# Patient Record
Sex: FEMALE | Race: AMERICAN INDIAN OR ALASKA NATIVE | NOT HISPANIC OR LATINO | Employment: OTHER | URBAN - METROPOLITAN AREA
[De-identification: names, ages, dates, MRNs, and addresses within clinical notes are randomized per-mention and may not be internally consistent; named-entity substitution may affect disease eponyms.]

---

## 2017-03-29 PROBLEM — E78.00 PURE HYPERCHOLESTEROLEMIA: Status: ACTIVE | Noted: 2017-03-29

## 2017-06-21 PROBLEM — J18.9 PNEUMONIA: Status: ACTIVE | Noted: 2017-06-21

## 2017-06-22 PROBLEM — H81.10 BPPV (BENIGN PAROXYSMAL POSITIONAL VERTIGO): Status: ACTIVE | Noted: 2017-06-22

## 2017-06-22 PROBLEM — J98.01 BRONCHOSPASM: Status: ACTIVE | Noted: 2017-06-22

## 2017-06-28 PROBLEM — E55.9 VITAMIN D DEFICIENCY: Status: ACTIVE | Noted: 2017-06-28

## 2017-08-23 PROBLEM — R06.09 DOE (DYSPNEA ON EXERTION): Status: ACTIVE | Noted: 2017-08-23

## 2017-08-23 PROBLEM — K21.9 GASTROESOPHAGEAL REFLUX DISEASE WITHOUT ESOPHAGITIS: Status: ACTIVE | Noted: 2017-08-23

## 2017-08-23 PROBLEM — R93.89 ABNORMAL CHEST CT: Status: ACTIVE | Noted: 2017-08-23

## 2017-08-23 PROBLEM — J98.4 RESTRICTIVE LUNG DISEASE: Status: ACTIVE | Noted: 2017-08-23

## 2017-08-28 PROBLEM — M41.80 DEXTROSCOLIOSIS: Status: ACTIVE | Noted: 2017-08-28

## 2017-08-28 PROBLEM — M25.562 LEFT KNEE PAIN: Status: ACTIVE | Noted: 2017-08-28

## 2017-09-29 PROBLEM — F33.41 RECURRENT MAJOR DEPRESSIVE DISORDER, IN PARTIAL REMISSION: Status: ACTIVE | Noted: 2017-09-29

## 2017-09-29 PROBLEM — F41.9 ANXIETY: Status: ACTIVE | Noted: 2017-09-29

## 2017-10-08 PROBLEM — R19.00 PELVIC MASS IN FEMALE: Status: ACTIVE | Noted: 2017-10-08

## 2017-10-09 NOTE — PROGRESS NOTES
"Subjective:       Patient ID: Lindy Sparks is a 67 y.o. female.    Chief Complaint: Pelvic Mass (Consult )    HPI     67yr old para 3 ( x 3) presents as a referral from Dr. Perry secondary to a pelvic mass.  Was visiting in [place] in 2017 and had pelvic pain radiating to back. Seen at [facility] ED and CT scan of abdomen and pelvis was done. This showed a 8.1 x 7.6 cm rounded mass noted within the vagina and appeared contiguous with the cervix. Patient has copy of report but no images.     10/3/2017 saw Dr. Maximilian Perry with complaint of pelvic pain.   US done that showed a midline pelvic mass 11 x 6 cm.     Referred for further management.       Also seen at [facility] in 2017 for WWE. Normal exam. Pessary was changed.   Seen in [place] [facility] [place]. US showed , the uterus appears within normal in size measuring 5.9 x 3.5 x 2.2 cm with hypoechoic area noted over the posterior fundus adjacent to the endometrial canal measuring 5 x 4.4 mm, being avascular and possibly a small fibroid. In the cervical region, a 2 x 1.4 x 0.7 cm prevascular lesion is noted consistent with probable nabothian cyst. Endometrial canal is minimally thickened measuring 3 mm in AP dimension and complex, possibly due to cyclical change.  Neither ovary could be visualized. No adnexal mass nor pelvic fluid could be detected.  Impressions:  Small hypoechoic area noted over the posterior uterine fundus adjacent to the endometrial canal, possibly a small fibroid. Probable nabothian type cyst in the cervical region.  No definite further abnormality could be detected, although neither ovary could be visualized.    Biopsy of lesion on posterior vagina was normal.    ED visit 2017 with inferior sternal pain. Resolved with pain medication and patient discharged 2 hours later.     Today reports pelvic pain that has caused her to lose her voice. Feels like "something is coming out" (pessary not in " "currently) but also feels pressure in her groin. No VB. LMP 20yrs ago.    Last pap 2016, normal. No history of abnormal pap smears.    PMH: CAD, HTN depression, migraines    PSH: lap debi, ectopic (LTV)    FH: F-lung CA (tobacco). No breast, ovarian or colon CA.    No tobacco use.    MMG 2017 with benign appearing small axillary lymph nodes    Colonoscopy  normal    Past Medical History:   Diagnosis Date    Acid reflux     Anxiety     Arthritis     Coronary artery disease     Depression     Ectopic pregnancy     Hypertension     Migraine     Pelvic mass in female 10/8/2017     Past Surgical History:   Procedure Laterality Date     SECTION      CHOLECYSTECTOMY      COLONOSCOPY      ECTOPIC PREGNANCY SURGERY      TONSILLECTOMY       Family History   Problem Relation Age of Onset    Hyperlipidemia Mother     Stroke Mother     Heart disease Father     Stroke Father     Stroke Brother     No Known Problems Daughter     No Known Problems Son     No Known Problems Daughter      Social History   Substance Use Topics    Smoking status: Never Smoker    Smokeless tobacco: Never Used    Alcohol use No      Comment: "maybe like twice a year"     Review of patient's allergies indicates:   Allergen Reactions    Buspar [buspirone] Other (See Comments)     Suicidal thoughts    Lisinopril Other (See Comments)     Unsure of reaction    Naproxen Other (See Comments)     Weakness, dizziness, chest pain    Pravastatin sodium      Chest pain  And headache       Current Outpatient Prescriptions:     albuterol (PROVENTIL) 2.5 mg /3 mL (0.083 %) nebulizer solution, Take 3 mLs (2.5 mg total) by nebulization every 6 (six) hours as needed for Wheezing., Disp: 300 mL, Rfl: 11    albuterol (VENTOLIN HFA) 90 mcg/actuation inhaler, INHALE 2 PUFFS INTO THE LUNGS EVERY 6 HOURS AS NEEDED WHEEZING, Disp: 18 g, Rfl: 0    aspirin (ECOTRIN) 81 MG EC tablet, Take 1 tablet (81 mg total) by mouth once daily., " Disp: 30 tablet, Rfl: 5    atorvastatin (LIPITOR) 20 MG tablet, Take 1 tablet (20 mg total) by mouth once daily., Disp: 30 tablet, Rfl: 5    CARTIA  mg 24 hr capsule, TK 1 C PO ONCE D, Disp: , Rfl: 11    conjugated estrogens (PREMARIN) vaginal cream, Place a pea-sized amount in vagina every other night. Patient unsure of dose, Disp: 45 g, Rfl: 02    DOCUSATE CALCIUM (STOOL SOFTENER ORAL), Take by mouth. Patient unsure of dose, Disp: , Rfl:     ergocalciferol (ERGOCALCIFEROL) 50,000 unit Cap, Take 1 capsule (50,000 Units total) by mouth every 7 days., Disp: 4 capsule, Rfl: 5    escitalopram oxalate (LEXAPRO) 20 MG tablet, Take 1 tablet (20 mg total) by mouth once daily., Disp: 30 tablet, Rfl: 5    fluticasone-vilanterol (BREO) 100-25 mcg/dose diskus inhaler, Inhale 1 puff into the lungs once daily. Controller, Disp: 28 each, Rfl: 5    gabapentin (NEURONTIN) 400 MG capsule, Take 1 capsule (400 mg total) by mouth every evening., Disp: 30 capsule, Rfl: 5    hydrochlorothiazide (MICROZIDE) 12.5 mg capsule, TAKE 1 CAPSULE BY MOUTH EVERY DAY, Disp: 30 capsule, Rfl: 5    hydrocodone-acetaminophen 7.5-325mg (NORCO) 7.5-325 mg per tablet, Take 1 tablet by mouth every 12 (twelve) hours as needed. g43.709, Disp: 60 tablet, Rfl: 0    hydrOXYzine pamoate (VISTARIL) 25 MG Cap, Take 1 capsule (25 mg total) by mouth every 6 (six) hours as needed., Disp: 120 capsule, Rfl: 3    losartan (COZAAR) 100 MG tablet, Take 1 tablet (100 mg total) by mouth once daily., Disp: 90 tablet, Rfl: 3    methocarbamol (ROBAXIN) 500 MG Tab, , Disp: , Rfl: 2    pantoprazole (PROTONIX) 40 MG tablet, Take 1 tablet (40 mg total) by mouth once daily. For reflux, Disp: 30 tablet, Rfl: 5    Review of Systems   Constitutional: Positive for fatigue. Negative for chills and fever.   Respiratory: Negative for cough, shortness of breath and wheezing.    Cardiovascular: Negative for chest pain, palpitations and leg swelling.   Gastrointestinal:  "Negative for abdominal pain, constipation, diarrhea, nausea and vomiting.   Genitourinary: Positive for frequency, pelvic pain and urgency. Negative for difficulty urinating, dysuria, hematuria, vaginal bleeding, vaginal discharge and vaginal pain.   Musculoskeletal: Positive for back pain.   Skin: Negative for color change and rash.   Neurological: Negative for weakness.   Hematological: Negative for adenopathy. Does not bruise/bleed easily.   Psychiatric/Behavioral: The patient is nervous/anxious.        Objective:     BP (!) 170/93   Pulse 71   Ht 5' 6" (1.676 m)   Wt 88.6 kg (195 lb 5.2 oz)   LMP  (LMP Unknown) Comment: 20 yrs ago, no PMB  BMI 31.53 kg/m²     Physical Exam   Constitutional: She is oriented to person, place, and time. She appears well-developed and well-nourished. No distress.   Neck: Normal range of motion.   Cardiovascular: Normal rate, regular rhythm and normal heart sounds.    No murmur heard.  Pulmonary/Chest: Effort normal and breath sounds normal. No respiratory distress. She has no wheezes. She has no rales.   Abdominal: Soft. She exhibits no distension and no ascites. There is tenderness in the right lower quadrant, suprapubic area and left lower quadrant. There is guarding.   Genitourinary: Vagina normal. No breast tenderness. Pelvic exam was performed with patient supine. There is no rash, tenderness or lesion on the right labia. There is no rash, tenderness or lesion on the left labia. Uterus is enlarged and tender. Right adnexum displays tenderness. Left adnexum displays tenderness. No bleeding in the vagina. No vaginal discharge found.   Genitourinary Comments: 12cm soft, fluid filled mass displacing cervix and DEX, unable to palpate adnexa or discern uterus and adnexa from mass   Musculoskeletal: Normal range of motion. She exhibits no edema or tenderness.   Neurological: She is alert and oriented to person, place, and time.   Skin: Skin is warm and dry. No rash noted. She is " not diaphoretic. No erythema. No pallor.   Psychiatric: She has a normal mood and affect. Her behavior is normal.   Vitals reviewed.      Assessment:       1. Pelvic mass in female    2. Recurrent major depressive disorder, in partial remission    3. SHAW on CPAP    4. Hoarseness    5. Essential hypertension    6. Coronary artery disease involving native coronary artery of native heart without angina pectoris        Plan:   Pelvic mass in female  I have recommended ANTONIO/BSO and staging as indicated by frozen section. Surgery scheduled for 10/30/2017   Consent forms were reviewed with patient. Questions were answered. Patient voiced understanding. Consents were signed.  Preop orders placed.     -     hydrocodone-acetaminophen 7.5-325mg (NORCO) 7.5-325 mg per tablet; Take 1 tablet by mouth every 12 (twelve) hours as needed. g43.709  Dispense: 60 tablet; Refill: 0  -     CBC auto differential; Future; Expected date: 10/10/2017  -     Comprehensive metabolic panel; Future; Expected date: 10/10/2017  -     CT Abdomen Pelvis With Contrast; Future; Expected date: 10/10/2017  -     X-Ray Chest PA And Lateral; Future; Expected date: 10/10/2017    Recurrent major depressive disorder, in partial remission    SHAW on CPAP    Hoarseness  Patient initially had this and was able to only whisper and then only for a short time.  Daughter was answering questions. After signing consents I came back into the office to speak with the patient and family and she was able to talk.   Essential hypertension    Coronary artery disease involving native coronary artery of native heart without angina pectoris  -     EKG 12-lead; Future    - schedule exploratory laparotomy, ANTONIO/BSO, resection of pelvic mass, possible staging pending frozen section  - clearance with IM  - The risks, benefits, and indications for surgery were discussed with the patient. These included bleeding, infection, damage to surrounding tissues, the possibility of bowel or  urologic resection and reconstruction, and the possibility of major complications including death. She voiced understanding, all questions were answered and consents were signed.    Family states that patient had hysterectomy done at time of ectopic pregnancy in the 1990's. All information would suggest that she still has her uterus. Clinical impression is that the mass may be a hematometra.

## 2017-10-10 ENCOUNTER — TELEPHONE (OUTPATIENT)
Dept: GYNECOLOGIC ONCOLOGY | Facility: CLINIC | Age: 67
End: 2017-10-10

## 2017-10-10 ENCOUNTER — HOSPITAL ENCOUNTER (OUTPATIENT)
Dept: CARDIOLOGY | Facility: CLINIC | Age: 67
Discharge: HOME | End: 2017-10-10
Payer: MEDICAID

## 2017-10-10 ENCOUNTER — HOSPITAL ENCOUNTER (OUTPATIENT)
Dept: RADIOLOGY | Facility: HOSPITAL | Age: 67
Discharge: HOME | End: 2017-10-10
Attending: OBSTETRICS & GYNECOLOGY
Payer: MEDICAID

## 2017-10-10 ENCOUNTER — INITIAL CONSULT (OUTPATIENT)
Dept: GYNECOLOGIC ONCOLOGY | Facility: CLINIC | Age: 67
End: 2017-10-10
Payer: MEDICAID

## 2017-10-10 VITALS
WEIGHT: 195.31 LBS | SYSTOLIC BLOOD PRESSURE: 170 MMHG | BODY MASS INDEX: 31.39 KG/M2 | HEART RATE: 71 BPM | HEIGHT: 66 IN | DIASTOLIC BLOOD PRESSURE: 93 MMHG

## 2017-10-10 DIAGNOSIS — F33.41 RECURRENT MAJOR DEPRESSIVE DISORDER, IN PARTIAL REMISSION: ICD-10-CM

## 2017-10-10 DIAGNOSIS — R49.0 HOARSENESS: ICD-10-CM

## 2017-10-10 DIAGNOSIS — R19.00 PELVIC MASS IN FEMALE: Primary | ICD-10-CM

## 2017-10-10 DIAGNOSIS — R19.00 PELVIC MASS IN FEMALE: ICD-10-CM

## 2017-10-10 DIAGNOSIS — I25.10 CORONARY ARTERY DISEASE INVOLVING NATIVE CORONARY ARTERY OF NATIVE HEART WITHOUT ANGINA PECTORIS: Chronic | ICD-10-CM

## 2017-10-10 DIAGNOSIS — G47.33 OSA ON CPAP: Chronic | ICD-10-CM

## 2017-10-10 DIAGNOSIS — I10 ESSENTIAL HYPERTENSION: Chronic | ICD-10-CM

## 2017-10-10 PROCEDURE — 71020 XR CHEST PA AND LATERAL: CPT | Mod: 26,,, | Performed by: RADIOLOGY

## 2017-10-10 PROCEDURE — 99205 OFFICE O/P NEW HI 60 MIN: CPT | Mod: S$PBB,,, | Performed by: OBSTETRICS & GYNECOLOGY

## 2017-10-10 PROCEDURE — 93010 ELECTROCARDIOGRAM REPORT: CPT | Mod: S$PBB,,, | Performed by: INTERNAL MEDICINE

## 2017-10-10 PROCEDURE — 99999 PR PBB SHADOW E&M-EST. PATIENT-LVL III: CPT | Mod: PBBFAC,,, | Performed by: OBSTETRICS & GYNECOLOGY

## 2017-10-10 PROCEDURE — 71020 XR CHEST PA AND LATERAL: CPT | Mod: TC

## 2017-10-10 PROCEDURE — 93005 ELECTROCARDIOGRAM TRACING: CPT | Mod: PBBFAC | Performed by: INTERNAL MEDICINE

## 2017-10-10 PROCEDURE — 99213 OFFICE O/P EST LOW 20 MIN: CPT | Mod: PBBFAC,25 | Performed by: OBSTETRICS & GYNECOLOGY

## 2017-10-10 RX ORDER — HYDROCODONE BITARTRATE AND ACETAMINOPHEN 7.5; 325 MG/1; MG/1
1 TABLET ORAL EVERY 12 HOURS PRN
Qty: 60 TABLET | Refills: 0 | Status: SHIPPED | OUTPATIENT
Start: 2017-10-10 | End: 2017-10-11

## 2017-10-10 RX ORDER — LIDOCAINE HYDROCHLORIDE 10 MG/ML
1 INJECTION, SOLUTION EPIDURAL; INFILTRATION; INTRACAUDAL; PERINEURAL ONCE
Status: CANCELLED | OUTPATIENT
Start: 2017-10-10 | End: 2017-10-10

## 2017-10-10 NOTE — LETTER
October 10, 2017      Maximilian Perry MD  [address] [address]           [facility] - [facility]  [address]  [place] [address]  Phone: [phone]          Patient: Lindy Sparks   MR Number: [id number]   YOB: 1950   Date of Visit: 10/10/2017       Dear Dr. Maximilian Perry:    Thank you for referring Lindy Sparks to me for evaluation. Attached you will find relevant portions of my assessment and plan of care.    If you have questions, please do not hesitate to call me. I look forward to following Lindy Sparsk along with you.    Sincerely,    Junior Buitrago MD    Enclosure  CC:  No Recipients    If you would like to receive this communication electronically, please contact [email or web address] or [phone] to request more information on [facility] access.    For providers and/or their staff who would like to refer a patient to [facility], please contact us through our one-stop-shop provider referral line, [facility], at [phone].    If you feel you have received this communication in error or would no longer like to receive these types of communications, please e-mail [email or web address]

## 2017-10-11 DIAGNOSIS — R19.00 PELVIC MASS IN FEMALE: Primary | ICD-10-CM

## 2017-10-11 RX ORDER — OXYCODONE AND ACETAMINOPHEN 10; 325 MG/1; MG/1
1 TABLET ORAL EVERY 6 HOURS PRN
Qty: 28 TABLET | Refills: 0 | Status: SHIPPED | OUTPATIENT
Start: 2017-10-11 | End: 2017-10-20

## 2017-10-11 NOTE — PROGRESS NOTES
Patient informed of CT scan results. Pharmacy would not fill her percocet 7.5 mg . New rx for 10 mg/325 mg percocet sent to pharmacy.

## 2017-10-16 NOTE — PRE ADMISSION SCREENING
Anesthesia Assessment: Preoperative EQUATION    Planned Procedure: Procedure(s) (LRB):  HYSTERECTOMY-ABDOMINAL-TOTAL (ANTONIO) (N/A)  [id number] (BSO) (Bilateral)  STAGING (N/A)  Requested Anesthesia Type:General  Surgeon: Junior Buitrago MD  Service: OB/GYN  Known or anticipated Date of Surgery:10/30/2017    Surgeon notes: reviewed    Electronic QUestionnaire Assessment completed via nurse interview with patient.      NO AQ    Triage considerations:     The patient has no apparent active cardiac condition (No unstable coronary Syndrome such as severe unstable angina or recent [<1 month] myocardial infarction, decompensated CHF, severe valvular   disease or significant arrhythmia)    Previous anesthesia records:Not available    Last PCP note: within 3 months , within [facility]  9/29  Subspecialty notes: Pulmonary  8/17    Other important co-morbidities: CAD, HTN, SHAW/CPAP, GERD, Depression/Anxiety, Vertigo, Headaches, Neuropathy, SOB     Tests already available:  Available tests,  within 1 month , within [facility] .   10/2017 CMP, CBC, CT Abd/Pelvis, EKG            Instructions given. (See in Nurse's note)    Optimization:  Anesthesia Preop Clinic Assessment  Indicated-Request for Medical Opinion from Gyn    Medical Opinion Indicated-Indicated        Plan:    Testing:  T&S          Consultation:[facility]-Operative Medical Consult-Dr Johnson  10/19     Patient  has previously scheduled Medical Appointment: 10/19 Dr Johnson    Navigation: Tests Scheduled. T&S  10/19            Results will be tracked by Preop Clinic.  10/20/17 Patient is optimized for surgery per Dr Johnson on 10/19/17    Fadumo Lamar RN

## 2017-10-17 ENCOUNTER — ANESTHESIA EVENT (OUTPATIENT)
Dept: SURGERY | Facility: HOSPITAL | Age: 67
DRG: 743 | End: 2017-10-17
Payer: MEDICAID

## 2017-10-17 DIAGNOSIS — Z01.818 PREOP TESTING: Primary | ICD-10-CM

## 2017-10-17 NOTE — ANESTHESIA PREPROCEDURE EVALUATION
[facility]  Anesthesia Pre-Operative Evaluation         Patient Name: Lindy Sparks  YOB: 1950  MRN: [id number]    SUBJECTIVE:     Pre-operative evaluation for Procedure(s) (LRB):  HYSTERECTOMY-ABDOMINAL-TOTAL (ANTONIO) (N/A)  [id number] (BSO) (Bilateral)  STAGING (N/A)     10/27/2017    Lindy Sparks is a 67 y.o. female w/ a significant PMHx of CAD, HTN, SHAW/CPAP, GERD, Depression/Anxiety, and   Recently discovered pelvic mass who presents for the above procedure.    Patient now presents for the above procedure(s).      LDA: None documented.    Prev airway: None documented.    Drips: None documented.    Patient Active Problem List   Diagnosis    Generalized anxiety disorder    Depression    Chronic lower back pain    Neuropathy    GERD (gastroesophageal reflux disease)    Allergic rhinitis    Coronary artery disease involving native coronary artery without angina pectoris, non obstructive    Trochanteric bursitis of right hip    Hoarseness    Migraine without status migrainosus, not intractable    SHAW on CPAP    Grief reaction    Mitral regurgitation    Diastolic dysfunction    Essential hypertension    Postural instability of trunk    Forgetfulness    Right arm pain    Obesity, Class I, BMI 30-34.9    Atrophic vaginitis    POP-Q stage 3 rectocele    Pure hypercholesterolemia    Bronchospasm    BPPV (benign paroxysmal positional vertigo)    Vitamin D deficiency    THOMPSON (dyspnea on exertion)    Abnormal chest CT    Gastroesophageal reflux disease without esophagitis    Restrictive lung disease    Left knee pain    Dextroscoliosis    Recurrent major depressive disorder, in partial remission    Anxiety    Pelvic mass in female    Pelvic mass in female    Atypical chest pain    Chronic, continuous use of opioids    Kidney stones    Edema    Tattoo       Review of patient's allergies indicates:   Allergen Reactions    Norvasc  [amlodipine] Swelling    Buspar [buspirone] Other (See Comments)     Suicidal thoughts    Lisinopril Other (See Comments)     Unsure of reaction    Naproxen Other (See Comments)     Weakness, dizziness, chest pain    Pravastatin sodium      Chest pain  And headache       No current facility-administered medications for this encounter.     Current Outpatient Prescriptions:     acetaminophen (TYLENOL) 325 MG tablet, Take 325 mg by mouth every 6 (six) hours as needed for Pain., Disp: , Rfl:     albuterol (PROVENTIL) 2.5 mg /3 mL (0.083 %) nebulizer solution, Take 3 mLs (2.5 mg total) by nebulization every 6 (six) hours as needed for Wheezing., Disp: 300 mL, Rfl: 11    albuterol (VENTOLIN HFA) 90 mcg/actuation inhaler, INHALE 2 PUFFS INTO THE LUNGS EVERY 6 HOURS AS NEEDED WHEEZING, Disp: 18 g, Rfl: 0    aspirin (ECOTRIN) 81 MG EC tablet, Take 1 tablet (81 mg total) by mouth once daily. (Patient taking differently: Take 81 mg by mouth every morning. ), Disp: 30 tablet, Rfl: 5    atorvastatin (LIPITOR) 20 MG tablet, Take 1 tablet (20 mg total) by mouth once daily. (Patient taking differently: Take 20 mg by mouth every morning. ), Disp: 30 tablet, Rfl: 5    conjugated estrogens (PREMARIN) vaginal cream, Place a pea-sized amount in vagina every other night. Patient unsure of dose, Disp: 45 g, Rfl: 02    DOCUSATE CALCIUM (STOOL SOFTENER ORAL), Take by mouth as needed. Patient unsure of dose, Disp: , Rfl:     ergocalciferol (ERGOCALCIFEROL) 50,000 unit Cap, Take 1 capsule (50,000 Units total) by mouth every 7 days. (Patient taking differently: Take 50,000 Units by mouth every 7 days. Takes on Fri), Disp: 4 capsule, Rfl: 5    escitalopram oxalate (LEXAPRO) 20 MG tablet, Take 1 tablet (20 mg total) by mouth once daily. (Patient taking differently: Take 20 mg by mouth every morning. ), Disp: 30 tablet, Rfl: 5    fluticasone-vilanterol (BREO) 100-25 mcg/dose diskus inhaler, Inhale 1 puff into the lungs once daily.  Controller (Patient taking differently: Inhale 1 puff into the lungs every morning. Controller), Disp: 28 each, Rfl: 5    gabapentin (NEURONTIN) 400 MG capsule, Take 1 capsule (400 mg total) by mouth every evening. (Patient taking differently: Take 400 mg by mouth every morning. ), Disp: 30 capsule, Rfl: 5    hydroCHLOROthiazide (MICROZIDE) 12.5 mg capsule, TAKE 1 CAPSULE BY MOUTH EVERY DAY, Disp: 30 capsule, Rfl: 3    losartan (COZAAR) 100 MG tablet, Take 1 tablet (100 mg total) by mouth once daily. (Patient taking differently: Take 100 mg by mouth every morning. ), Disp: 90 tablet, Rfl: 3    methocarbamol (ROBAXIN) 500 MG Tab, every other day. , Disp: , Rfl: 2    ondansetron (ZOFRAN) 4 MG tablet, Take 1 tablet (4 mg total) by mouth every 8 (eight) hours as needed., Disp: 12 tablet, Rfl: 0    pantoprazole (PROTONIX) 40 MG tablet, Take 1 tablet (40 mg total) by mouth once daily. For reflux (Patient taking differently: Take 40 mg by mouth every morning. For reflux), Disp: 30 tablet, Rfl: 5    sumatriptan (IMITREX) 50 MG tablet, Take 50 mg by mouth every 2 (two) hours as needed for Migraine., Disp: , Rfl:     VENTOLIN HFA 90 mcg/actuation inhaler, INHALE 2 PUFFS INTO THE LUNGS EVERY 6 HOURS AS NEEDED WHEEZING, Disp: 18 g, Rfl: 0    No current facility-administered medications on file prior to encounter.      Current Outpatient Prescriptions on File Prior to Encounter   Medication Sig Dispense Refill    albuterol (PROVENTIL) 2.5 mg /3 mL (0.083 %) nebulizer solution Take 3 mLs (2.5 mg total) by nebulization every 6 (six) hours as needed for Wheezing. 300 mL 11    albuterol (VENTOLIN HFA) 90 mcg/actuation inhaler INHALE 2 PUFFS INTO THE LUNGS EVERY 6 HOURS AS NEEDED WHEEZING 18 g 0    aspirin (ECOTRIN) 81 MG EC tablet Take 1 tablet (81 mg total) by mouth once daily. (Patient taking differently: Take 81 mg by mouth every morning. ) 30 tablet 5    atorvastatin (LIPITOR) 20 MG tablet Take 1 tablet (20 mg total)  by mouth once daily. (Patient taking differently: Take 20 mg by mouth every morning. ) 30 tablet 5    conjugated estrogens (PREMARIN) vaginal cream Place a pea-sized amount in vagina every other night. Patient unsure of dose 45 g 02    DOCUSATE CALCIUM (STOOL SOFTENER ORAL) Take by mouth as needed. Patient unsure of dose      ergocalciferol (ERGOCALCIFEROL) 50,000 unit Cap Take 1 capsule (50,000 Units total) by mouth every 7 days. (Patient taking differently: Take 50,000 Units by mouth every 7 days. Takes on Fri) 4 capsule 5    escitalopram oxalate (LEXAPRO) 20 MG tablet Take 1 tablet (20 mg total) by mouth once daily. (Patient taking differently: Take 20 mg by mouth every morning. ) 30 tablet 5    fluticasone-vilanterol (BREO) 100-25 mcg/dose diskus inhaler Inhale 1 puff into the lungs once daily. Controller (Patient taking differently: Inhale 1 puff into the lungs every morning. Controller) 28 each 5    gabapentin (NEURONTIN) 400 MG capsule Take 1 capsule (400 mg total) by mouth every evening. (Patient taking differently: Take 400 mg by mouth every morning. ) 30 capsule 5    losartan (COZAAR) 100 MG tablet Take 1 tablet (100 mg total) by mouth once daily. (Patient taking differently: Take 100 mg by mouth every morning. ) 90 tablet 3    methocarbamol (ROBAXIN) 500 MG Tab every other day.   2    pantoprazole (PROTONIX) 40 MG tablet Take 1 tablet (40 mg total) by mouth once daily. For reflux (Patient taking differently: Take 40 mg by mouth every morning. For reflux) 30 tablet 5       Past Surgical History:   Procedure Laterality Date     SECTION      CHOLECYSTECTOMY      COLONOSCOPY      ECTOPIC PREGNANCY SURGERY      TONSILLECTOMY         Social History     Social History    Marital status: Single     Spouse name: N/A    Number of children: N/A    Years of education: N/A     Occupational History    Not on file.     Social History Main Topics    Smoking status: Never Smoker    Smokeless  tobacco: Never Used    Alcohol use 0.6 oz/week     1 Standard drinks or equivalent per week      Comment: occ    Drug use: No    Sexual activity: Yes     Partners: Male     Birth control/ protection: Surgical     Other Topics Concern    Not on file     Social History Narrative    No narrative on file       OBJECTIVE:     Vital Signs Range (Last 24H):         Significant Labs:  Lab Results   Component Value Date    WBC 8.23 10/20/2017    HGB 12.5 10/20/2017    HCT 39.8 10/20/2017     10/20/2017    CHOL 142 06/21/2017    TRIG 105 06/21/2017    HDL 35 (L) 06/21/2017    ALT 21 10/20/2017    AST 23 10/20/2017     10/20/2017    K 3.5 10/20/2017     10/20/2017    CREATININE 0.7 10/20/2017    BUN 17 10/20/2017    CO2 27 10/20/2017    TSH 2.550 09/13/2017    INR 1.0 09/13/2017    HGBA1C 6.2 03/22/2017       Diagnostic Studies: 10/10/17 CT Abdomen/Pelvis  Interval development of enlargement of the uterus with low density in the endometrial canal. The possibility of a lesion in the cervix should be considered and direct visualization with hysteroscopy may be necessary for further assessment.  Evidence for a prior cholecystectomy.  Edema with mildly prominent lymph nodes in the mesentery suggestive of mesenteric lymphadenitis.  Metallic clip in the pelvis.    PFT  7/2017   Normal spirometry, no evidence of obstruction.  No response to bronchodilation.  Lung volumes reveal mild restriction.  DLCO mildly decreased..  In comparison to complete pulmonary function test performed in 04/2016,   there appears to be notable decrease in spirometric values and also lung volumes appear to reveal further worsening restricted physiology.    EKG: 10/10/17  Normal sinus rhythm  Normal ECG  When compared with ECG of 13-SEP-2017 05:50,  No significant change was found    2D ECHO:  Results for orders placed or performed during the hospital encounter of 06/05/16   2D echo with color flow doppler   Result Value Ref Range     EF 55 55 - 65    Mitral Valve Regurgitation TRIVIAL     Diastolic Dysfunction No     Est. PA Systolic Pressure 24.9     Tricuspid Valve Regurgitation TRIVIAL           ASSESSMENT/PLAN:     Anesthesia Assessment: Preoperative EQUATION     Planned Procedure: Procedure(s) (LRB):  HYSTERECTOMY-ABDOMINAL-TOTAL (ANTONIO) (N/A)  [id number] (BSO) (Bilateral)  STAGING (N/A)  Requested Anesthesia Type:General  Surgeon: Junior Buitrago MD  Service: OB/GYN  Known or anticipated Date of Surgery:10/30/2017     Surgeon notes: reviewed     Electronic QUestionnaire Assessment completed via nurse interview with patient.      NO AQ     Triage considerations:      The patient has no apparent active cardiac condition (No unstable coronary Syndrome such as severe unstable angina or recent [<1 month] myocardial infarction, decompensated CHF, severe valvular   disease or significant arrhythmia)     Previous anesthesia records:Not available     Last PCP note: within 3 months , within [facility]  9/29  Subspecialty notes: Pulmonary  8/17     Other important co-morbidities: CAD, HTN, SHAW/CPAP, GERD, Depression/Anxiety, Vertigo, Headaches, Neuropathy, SOB     Tests already available:  Available tests,  within 1 month , within [facility] .   10/2017 CMP, CBC, CT Abd/Pelvis, EKG                            Instructions given. (See in Nurse's note)     Optimization:  Anesthesia Preop Clinic Assessment  Indicated-Request for Medical Opinion from Gyn    Medical Opinion Indicated-Indicated                                        Plan:    Testing:  T&S                                                                 Consultation:[facility]-Operative Medical Consult-Dr Johnson  10/19                           Patient  has previously scheduled Medical Appointment: 10/19 Dr Johnson     Navigation: Tests Scheduled. T&S  10/19                       Results will be tracked by Preop Clinic.  10/20/17 Patient is optimized for surgery per Dr Johnson on  10/19/17      Fadumo Lamar RN                                                                                                                  10/17/2017  Lindy Sparks is a 67 y.o., female.    Anesthesia Evaluation    I have reviewed the Patient Summary Reports.    I have reviewed the Nursing Notes.   I have reviewed the Medications.     Review of Systems  Anesthesia Hx:  No problems with previous Anesthesia  History of prior surgery of interest to airway management or planning: Previous anesthesia: General Denies Family Hx of Anesthesia complications.   Denies Personal Hx of Anesthesia complications.   Social:  Alcohol Use, Non-Smoker    Hematology/Oncology:  Hematology Normal   Oncology Normal     EENT/Dental:EENT/Dental Normal   Cardiovascular:   Hypertension CAD   Denies CABG/stent.  Denies Dysrhythmias.      THOMPSON ECG has been reviewed.    Pulmonary:   Asthma moderate Shortness of breath Sleep Apnea, CPAP Mild restrictive lung disease   Renal/:  Renal/ Normal     Hepatic/GI:   GERD    Musculoskeletal:  Ankylosing Spondylitis    Neurological:   Headaches  Dx of Headaches   Endocrine:  Endocrine Normal    Psych:   Psychiatric History anxiety depression          Physical Exam  General:  Well nourished    Airway/Jaw/Neck:  Airway Findings: Mouth Opening: Normal Tongue: Normal  Jaw/Neck Findings:  Neck ROM: Normal ROM      Dental:  Dental Findings:    Chest/Lungs:  Chest/Lungs Findings: Clear to auscultation, Normal Respiratory Rate     Heart/Vascular:  Heart Findings: Normal Heart murmur: negative    Abdomen:  Abdomen Findings: Normal    Musculoskeletal:  Musculoskeletal Findings: Normal   Skin:  Skin Findings: Normal    Mental Status:  Mental Status Findings:  Cooperative, Alert and Oriented         Anesthesia Plan  Type of Anesthesia, risks & benefits discussed:  Anesthesia Type:  general  Patient's Preference:   Intra-op Monitoring Plan: standard ASA monitors and arterial line  Intra-op Monitoring Plan  Comments:   Post Op Pain Control Plan: per primary service following discharge from PACU, IV/PO Opioids PRN and multimodal analgesia  Post Op Pain Control Plan Comments:   Induction:   IV  Beta Blocker:  Patient is not currently on a Beta-Blocker (No further documentation required).       Informed Consent: Patient understands risks and agrees with Anesthesia plan.  Questions answered. Anesthesia consent signed with patient.  ASA Score: 3     Day of Surgery Review of History & Physical:  There are no significant changes.          Ready For Surgery From Anesthesia Perspective.

## 2017-10-19 ENCOUNTER — INITIAL CONSULT (OUTPATIENT)
Dept: INTERNAL MEDICINE | Facility: CLINIC | Age: 67
End: 2017-10-19
Payer: MEDICAID

## 2017-10-19 ENCOUNTER — LAB VISIT (OUTPATIENT)
Dept: LAB | Facility: HOSPITAL | Age: 67
End: 2017-10-19
Attending: ANESTHESIOLOGY
Payer: MEDICAID

## 2017-10-19 ENCOUNTER — TELEPHONE (OUTPATIENT)
Dept: GYNECOLOGIC ONCOLOGY | Facility: CLINIC | Age: 67
End: 2017-10-19

## 2017-10-19 ENCOUNTER — NURSE TRIAGE (OUTPATIENT)
Dept: ADMINISTRATIVE | Facility: CLINIC | Age: 67
End: 2017-10-19

## 2017-10-19 VITALS
SYSTOLIC BLOOD PRESSURE: 152 MMHG | HEIGHT: 66 IN | BODY MASS INDEX: 31.23 KG/M2 | DIASTOLIC BLOOD PRESSURE: 97 MMHG | HEART RATE: 58 BPM | TEMPERATURE: 97 F | OXYGEN SATURATION: 95 % | WEIGHT: 194.31 LBS

## 2017-10-19 DIAGNOSIS — L81.8 TATTOO: ICD-10-CM

## 2017-10-19 DIAGNOSIS — N20.0 KIDNEY STONES: ICD-10-CM

## 2017-10-19 DIAGNOSIS — F11.90 CHRONIC, CONTINUOUS USE OF OPIOIDS: ICD-10-CM

## 2017-10-19 DIAGNOSIS — F32.A DEPRESSION, UNSPECIFIED DEPRESSION TYPE: Chronic | ICD-10-CM

## 2017-10-19 DIAGNOSIS — J98.4 RESTRICTIVE LUNG DISEASE: ICD-10-CM

## 2017-10-19 DIAGNOSIS — K21.9 GASTROESOPHAGEAL REFLUX DISEASE, ESOPHAGITIS PRESENCE NOT SPECIFIED: Chronic | ICD-10-CM

## 2017-10-19 DIAGNOSIS — R60.9 EDEMA, UNSPECIFIED TYPE: ICD-10-CM

## 2017-10-19 DIAGNOSIS — I51.89 DIASTOLIC DYSFUNCTION: Chronic | ICD-10-CM

## 2017-10-19 DIAGNOSIS — Z01.818 PREOP TESTING: ICD-10-CM

## 2017-10-19 DIAGNOSIS — Z01.818 PREOP EXAMINATION: Primary | ICD-10-CM

## 2017-10-19 DIAGNOSIS — G47.33 OSA ON CPAP: Chronic | ICD-10-CM

## 2017-10-19 DIAGNOSIS — R07.89 ATYPICAL CHEST PAIN: ICD-10-CM

## 2017-10-19 DIAGNOSIS — I10 ESSENTIAL HYPERTENSION: Chronic | ICD-10-CM

## 2017-10-19 PROBLEM — J18.9 PNEUMONIA: Status: RESOLVED | Noted: 2017-06-21 | Resolved: 2017-10-19

## 2017-10-19 LAB
ABO + RH BLD: NORMAL
BLD GP AB SCN CELLS X3 SERPL QL: NORMAL

## 2017-10-19 PROCEDURE — 36415 COLL VENOUS BLD VENIPUNCTURE: CPT

## 2017-10-19 PROCEDURE — 86900 BLOOD TYPING SEROLOGIC ABO: CPT

## 2017-10-19 PROCEDURE — 99214 OFFICE O/P EST MOD 30 MIN: CPT | Mod: PBBFAC | Performed by: HOSPITALIST

## 2017-10-19 PROCEDURE — 86850 RBC ANTIBODY SCREEN: CPT

## 2017-10-19 PROCEDURE — 99214 OFFICE O/P EST MOD 30 MIN: CPT | Mod: S$PBB,,, | Performed by: HOSPITALIST

## 2017-10-19 PROCEDURE — 99999 PR PBB SHADOW E&M-EST. PATIENT-LVL IV: CPT | Mod: PBBFAC,,, | Performed by: HOSPITALIST

## 2017-10-19 RX ORDER — ACETAMINOPHEN 325 MG/1
325 TABLET ORAL EVERY 6 HOURS PRN
Status: ON HOLD | COMMUNITY
End: 2017-11-01 | Stop reason: HOSPADM

## 2017-10-19 RX ORDER — SUMATRIPTAN 50 MG/1
50 TABLET, FILM COATED ORAL
COMMUNITY
End: 2017-11-09 | Stop reason: SDUPTHER

## 2017-10-19 NOTE — ASSESSMENT & PLAN NOTE
Hypertension-  Blood pressure is acceptable . I suggest continuation of -Cartia-- during the entire perioperative period. I suggest holding -losartan, HCTZ- on the morning of the surgery and can continue that  post operatively under blood pressure, electrolyte and renal function monitoring as long as they are acceptable.I suggest addressing pain control as uncontrolled pain can increased blood pressure
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
## 2017-10-19 NOTE — ASSESSMENT & PLAN NOTE
Edema- I suggested avoidance of added salt,avoidance of NSAID's ( except ASA 81 mg )  and suggested Limb elevation and ron hose use

## 2017-10-19 NOTE — ASSESSMENT & PLAN NOTE
Chronic continuous opioid use- In view of the opioid use, the patient may have opioid tolerance .  I suggest considering the possibility of opioid tolerance  in planning post operative pain control

## 2017-10-19 NOTE — ASSESSMENT & PLAN NOTE
I suggest  caution with usage of medication that can cause respiratory suppression in the perioperative period

## 2017-10-19 NOTE — ASSESSMENT & PLAN NOTE
GERD-  I suggest continuation of the Proton pump inhibitor in the perioperative period . I suggest aspiration precautions  Discussed tips to control acid reflux

## 2017-10-19 NOTE — ASSESSMENT & PLAN NOTE
Does not sound cardiac in nature   Cardiac cath showed non obstructive CAD  CT showed No pulmonary embolism   Symptoms possibly from Aced reflux

## 2017-10-19 NOTE — TELEPHONE ENCOUNTER
----- Message from Johanna Herndon sent at 10/19/2017  1:15 PM CDT -----  Regarding: Additional information needed  Good Afternoon,      Regarding patient Lindy Sparks, Her insurance company is asking for the pt and doctor to sign the medicaid hystectomy consent form? I have it ready to send. Is there a fax# to send this to and have it returned to me asap? The insurance company is requesting this no later than tomorrow?      Johanna [id number]

## 2017-10-19 NOTE — LETTER
October 19, 2017      Rhonda G Leopold, MD  [address]  [place] [address]           [facility] - Pre Op Consult  [address] [facility] [address]  Phone: [phone]          Patient: Lindy Sparks   MR Number: [id number]   YOB: 1950   Date of Visit: 10/19/2017       Dear Dr. Rhonda G Leopold:    Thank you for referring Lindy Sparks to me for evaluation. Attached you will find relevant portions of my assessment and plan of care.    If you have questions, please do not hesitate to call me. I look forward to following Lindy Sparks along with you.    Sincerely,    Jessica Johnson MD    Enclosure  CC:  MD Junior Adkins MD    If you would like to receive this communication electronically, please contact [email or web address] or [phone] to request more information on [employer] access.    For providers and/or their staff who would like to refer a patient to [facility], please contact us through our one-stop-shop provider referral line, [facility], at [phone].    If you feel you have received this communication in error or would no longer like to receive these types of communications, please e-mail [email or web address]

## 2017-10-19 NOTE — ASSESSMENT & PLAN NOTE
I suggest monitoring the sodium as SIADH from Lexapro  I  use and hypersecretion of ADH associated with surgery can reduce sodium in the perioperative period  Bereavement in family , 3 of her grand children that she raised were incarcerated   Suspect some of her symptoms may be from her depression
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
## 2017-10-19 NOTE — TELEPHONE ENCOUNTER
Left message for pt to contact office to set up time for her to come in and sign medicaid form by Monday for upcoming procedure on Monday 10/30.

## 2017-10-19 NOTE — ASSESSMENT & PLAN NOTE
PFT - Aug 2017    Study meets validity.  Normal spirometry, no evidence of obstruction.  No response to bronchodilation.  Lung volumes reveal mild restriction.  DLCO mildly decreased..  In comparison to complete pulmonary function test performed in 04/2016,   there appears to be notable decrease in spirometric values and also lung volumes  appear to reveal further worsening restricted physiology.  SOB on exertion- for 6 months   Not on supplemental oxygen

## 2017-10-19 NOTE — OUTPATIENT SUBJECTIVE & OBJECTIVE
Outpatient Subjective & Objective     Chief complaint-Preoperative evaluation, Perioperative Medical management, complication reduction plan     Active cardiac conditions- none    Revised cardiac risk index predictors- high-risk type of surgery    Functional capacity -Examples of physical activity , used to be active until 2017, was working in the yard--history of chest pain on exertion that did not stop her activities    METS less than 4 METs  Under cardiology care , having stress test 10/23     Review of Systems   Constitutional: Negative for chills and fever.        No unusual weight changes   HENT:        Sleep apnea   Eyes:        Some times has blurred vision   Respiratory:        No cough , phlegm  No hemoptysis   Cardiovascular:        As noted   Gastrointestinal:        No overt GI/ blood losses  Bowel movements- Regular    Endocrine:          Was on sterid 1 month ago and had it for 2 months - ear cause lung cause   Genitourinary: Negative for dysuria.        No hesitancy   Musculoskeletal:        As above      Skin: Negative for rash.   Neurological: Negative for syncope.        No unilateral weakness   Hematological:        ASA prevention - 1 week pre op hold suggested     Psychiatric/Behavioral:        Depression  No SI/HI     No vascular stenting     Past Medical History Pertinent Negatives:   Diagnosis Date Noted    Difficult intubation 2015    Hypotension, iatrogenic 2015    Malignant hyperthermia 2015    PONV (postoperative nausea and vomiting) 2015     Family History   Problem Relation Age of Onset    Hyperlipidemia Mother     Stroke Mother     Heart disease Father     Stroke Father     Stroke Brother     No Known Problems Daughter     No Known Problems Son     No Known Problems Daughter      Past Surgical History:   Procedure Laterality Date     SECTION      CHOLECYSTECTOMY      COLONOSCOPY      ECTOPIC PREGNANCY SURGERY       "TONSILLECTOMY     No anesthesia, bleeding , cardiac problems , PONV with previous surgeries/ procedures   FH- No anesthesia, thrombosis/ bleeding  ,  in family   Lives alone , help available post op   No anesthesia, bleeding, cardiac problems with previous surgeries/procedures.  Medications and Allergies reviewed in epic.   CV risks- HTN,HLD  Physical Exam   HENT:   Head: Normocephalic.       Physical Exam   HENT:   Head: Normocephalic.     Constitutional- Vitals - Body mass index is 31.36 kg/m².,   Vitals:    10/19/17 1047   BP: (!) 152/97   Pulse: (!) 58   Temp: 97.2 °F (36.2 °C)     General appearance-Conscious,Coherent  Eyes- No conjunctival icterus,pupils  round  and reactive to light   ENT-Oral cavity- moist  , Hearing grossly normal   Neck- No thyromegaly ,Trachea -central, No jugular venous distension,   No Carotid Bruit   Cardiovascular -Heart Sounds- Normal  and  no murmur   , No gallop rhythm   Respiratory - Normal Respiratory Effort, Normal breath sounds,  Crepitationsleft base,  no wheeze  and  no forced expiratory wheeze    Peripheral pitting pedal edema-- mild and  bilateral lower extremity telangiectasia , no calf pain   Gastrointestinal -Soft abdomen, No palpable masses, Non Tender,Liver,Spleen not palpable. No-- free fluid and shifting dullness  Musculoskeletal- No finger Clubbing. Strength grossly normal   Lymphatic-No Palpable cervical, axillary,Inguinal lymphadenopathy   Psychiatric - normal effect,Orientation  Rt Dorsalis pedis pulses-palpable    Lt Dorsalis pedis pulses- palpable   Rt Posterior tibial pulses -palpable   Left posterior tibial pulses -palpable   Miscellaneous -  no asterixis,  no dupuytren's contracture,  no suggestion of bacterial feet infection,  no renal bruit and  onychomycosis  Blood pressure (!) 152/97, pulse (!) 58, temperature 97.2 °F (36.2 °C), temperature source Oral, height 5' 6" (1.676 m), weight 88.1 kg (194 lb 4.8 oz), SpO2 95 %.      Investigations  Lab and " Imaging have been reviewed in [facility].    Review of Medicine tests    EKG- I had independently reviewed the EKG from--10/10/2017   It was reported to be showing     Normal sinus rhythm  Normal ECG  When compared with ECG of 13-SEP-2017 05:50,  No significant change was found    June 2016       1 - Normal left ventricular systolic function (EF 55-60%).     2 - Normal right ventricular systolic function .     3 - Normal left ventricular diastolic function.     4 - Trivial mitral regurgitation.     5 - The estimated PA systolic pressure is 25 mmHg.     6 - Mild pulmonic regurgitation.     7 - Trivial tricuspid regurgitation.     CXR Oct 2017- No acute cardiopulmonary processes    Review of clinical lab tests-Date--10/10/2017 - Creatinine-0.8Date--10/10/.2017 Hemoglobin-- N Platelet count--N    2014     1 - Normal left ventricular systolic function (EF 55-60%).     2 - Normal right ventricular systolic function     Oct 2017    The liver, spleen and pancreas enhance normally    Review of old records- Was done and information gathered regards to events leading to surgery and health conditions of significance in the perioperative period.    Outpatient Subjective & Objective

## 2017-10-19 NOTE — PATIENT INSTRUCTIONS
Your surgery has been scheduled for:__Monday 10/30__     You should report to:  _____[facility], located on the [place] side of the first floor of the [facility] ([phone])  ___X___[facility] Second [facility] Center, located on the [facility] side of the Second floor of the [facility] ([phone])  ______3rd Floor [facility] located on the [facility] side of the [facility] [phone]    Please Note  -Tell your doctors if you take asprin, products containing aspirin, herbal medications or blood thinners, such as Coumadin, Ticlid, or Plavix. (Consult your provider regarding holding or stopping before surgery).  -Arrange for someone to drive you home following surgery. You will not be allowed to leave the surgical facility alone or drive yourself home following sedation and anesthesia.      Outpatient Encounter Prescriptions as of 10/19/2017   Medication Sig Note Dispense Refill    acetaminophen (TYLENOL) 325 MG tablet Take 325 mg by mouth every 6 (six) hours as needed for Pain. 10/19/2017: Take as needed      albuterol (PROVENTIL) 2.5 mg /3 mL (0.083 %) nebulizer solution Take 3 mLs (2.5 mg total) by nebulization every 6 (six) hours as needed for Wheezing. 10/19/2017: Use AM of surgery 300 mL 11    albuterol (VENTOLIN HFA) 90 mcg/actuation inhaler INHALE 2 PUFFS INTO THE LUNGS EVERY 6 HOURS AS NEEDED WHEEZING 10/19/2017: Use as needed and bring AM of surgery 18 g 0    aspirin (ECOTRIN) 81 MG EC tablet Take 1 tablet (81 mg total) by mouth once daily. (Patient taking differently: Take 81 mg by mouth every morning. ) 10/19/2017: Per Dr Johnson 30 tablet 5    atorvastatin (LIPITOR) 20 MG tablet Take 1 tablet (20 mg total) by mouth once daily. (Patient taking differently: Take 20 mg by mouth every morning. ) 10/19/2017: Take AM of surgery 30 tablet 5    CARTIA  mg 24 hr capsule TK 1 C PO ONCE D am 10/19/2017: Take AM of surgery  11     DOCUSATE CALCIUM (STOOL SOFTENER ORAL) Take by mouth as needed. Patient unsure of dose 10/19/2017: Hold AM of surgery      ergocalciferol (ERGOCALCIFEROL) 50,000 unit Cap Take 1 capsule (50,000 Units total) by mouth every 7 days. (Patient taking differently: Take 50,000 Units by mouth every 7 days. Takes on Fri) 10/19/2017: Take as sched 4 capsule 5    escitalopram oxalate (LEXAPRO) 20 MG tablet Take 1 tablet (20 mg total) by mouth once daily. (Patient taking differently: Take 20 mg by mouth every morning. ) 10/19/2017: Take AM of surgery 30 tablet 5    fluticasone-vilanterol (BREO) 100-25 mcg/dose diskus inhaler Inhale 1 puff into the lungs once daily. Controller (Patient taking differently: Inhale 1 puff into the lungs every morning. Controller) 10/19/2017: Use AM of surgery 28 each 5    gabapentin (NEURONTIN) 400 MG capsule Take 1 capsule (400 mg total) by mouth every evening. (Patient taking differently: Take 400 mg by mouth every morning. ) 10/19/2017: Take AM of surgery 30 capsule 5    hydrochlorothiazide (MICROZIDE) 12.5 mg capsule TAKE 1 CAPSULE BY MOUTH EVERY DAY (Patient taking differently: TAKE 1 CAPSULE BY MOUTH EVERY DAY  am) 10/19/2017: Hold AM of surgery 30 capsule 5    losartan (COZAAR) 100 MG tablet Take 1 tablet (100 mg total) by mouth once daily. (Patient taking differently: Take 100 mg by mouth every morning. ) 10/19/2017: Hold AM of surgery 90 tablet 3    methocarbamol (ROBAXIN) 500 MG Tab every other day.  10/19/2017: Take AM of surgery  2    oxycodone-acetaminophen (PERCOCET)  mg per tablet Take 1 tablet by mouth every 6 (six) hours as needed for Pain. 10/19/2017: Take as needed 28 tablet 0    pantoprazole (PROTONIX) 40 MG tablet Take 1 tablet (40 mg total) by mouth once daily. For reflux (Patient taking differently: Take 40 mg by mouth every morning. For reflux) 10/19/2017: Take AM of surgery 30 tablet 5    sumatriptan (IMITREX) 50 MG tablet Take 50 mg by mouth every 2 (two)  hours as needed for Migraine. 10/19/2017: Take as needed      VENTOLIN HFA 90 mcg/actuation inhaler INHALE 2 PUFFS INTO THE LUNGS EVERY 6 HOURS AS NEEDED WHEEZING 10/19/2017: Use as needed and bring AM of surgery 18 g 0    conjugated estrogens (PREMARIN) vaginal cream Place a pea-sized amount in vagina every other night. Patient unsure of dose 10/19/2017: Not currently using 45 g 02        3     No facility-administered encounter medications on file as of 10/19/2017.          Please review the instructions regarding your above listed medications.    Before Surgery  -Stop taking all herbal medications 14 days prior to surgery  -Stop taking asprin, products containing asprin 7 days before surgery  -Stop taking blood thinners   days before surgery  -Refrain from drinking alcoholic beverages for 24 hours before and after surgery  -Stop or limit smoking   days before surgery    Night before Surgery  -DO NOT EAT OR DRINK ANYTHING AFTER MIDNIGHT, INCLUDING GUM, HARD CANDY, MINTS, OR CHEWING TOBACCO  -Take a shower or bath (shower is recommended). Bathe with Hibiciens soap or an antibacterial soap from the neck down. If not supplied by your surgeon, Hibiciens soap will need to be purchased over the counter in the pharmacy. Rinse soap off thoroughly.  -Shampoo your hair with your regular shampoo    The Day of Surgery  -Take another bath or shower with Hibiciens or any antibacterial soap, to reduce the chance of infection.  -Take heart and blood pressure medications with a small sip of water, as advised by the perioperative team.  -Do not take fluid pills  -You may brush your teeth and rinse your mouth, but do not swallow any additional water.  -Do not apply perfumes, powder, body lotions, or deodorant on the day of surgery.  -Nail polish should be removed  -Do not wear makeup or moisturizer  -Wear comfortable clothes, such as a button front shirt and loose fitting pants.  -Leave all jewelry, including body piercings, and  valuables at home.  -Bring any devices you will need after surgery such as crutches or canes.  -If you have sleep apnea, please bring your CPAP machine    In the event of your physical conditions including the onset of a cold or respiratory illness, or if you have to delay or cancel your surgery, please notify your surgeon.     If you have any questions or concerns, please don't hesitate to call.    Sincerely,    Jayne [phone]                Tips to Control Acid Reflux    To control acid reflux, youll need to make some basic diet and lifestyle changes. The simple steps outlined below may be all youll need to ease discomfort.  Watch what you eat  · Avoid fatty foods and spicy foods.  · Eat fewer acidic foods, such as citrus and tomato-based foods. These can increase symptoms.  · Limit drinking alcohol, caffeine, and fizzy beverages. All increase acid reflux.  · Try limiting chocolate, peppermint, and spearmint. These can worsen acid reflux in some people.  Watch when you eat  · Avoid lying down for 3 hours after eating.  · Do not snack before going to bed.  Raise your head  Raising your head and upper body by 4 to 6 inches helps limit reflux when youre lying down. Put blocks under the head of your bed frame to raise it.  Other changes  · Lose weight, if you need to  · Dont exercise near bedtime  · Avoid tight-fitting clothes  · Limit aspirin and ibuprofen  · Stop smoking   Date Last Reviewed: 7/1/2016  © 5679-8232 [employer]. [address] [place] [address], [place] [address]. All rights reserved. This information is not intended as a substitute for professional medical care. Always follow your healthcare professional's instructions.

## 2017-10-20 ENCOUNTER — TELEPHONE (OUTPATIENT)
Dept: GYNECOLOGIC ONCOLOGY | Facility: CLINIC | Age: 67
End: 2017-10-20

## 2017-10-20 NOTE — TELEPHONE ENCOUNTER
"  Reason for Disposition   SEVERE vaginal bleeding (i.e., soaking 2 pads or tampons per hour and present 2 or more hours)    Answer Assessment - Initial Assessment Questions  1. AMOUNT: "Describe the bleeding that you are having."     - SPOTTING: spotting, or pinkish / brownish mucous discharge; does not fill panti-liner or pad     - MILD:  less than 1 pad / hour; less than patient's usual menstrual bleeding    - MODERATE: 1-2 pads / hour; small-medium blood clots (e.g., pea, grape, small coin)     - SEVERE: soaking 2 or more pads/hour for 2 or more hours; bleeding not contained by pads or continuous red blood from vagina; large blood clots (e.g., golf ball, large coin)       Large   2. ONSET: "When did the bleeding begin?" "Is it continuing now?"      Today   3. MENSTRUAL PERIOD: "When was the last normal menstrual period?" "How is this different than your period?"      N/A   4. REGULARITY: "How regular are your periods?"      Postmenopausal   5. ABDOMINAL PAIN: "Do you have any pain?" "How bad is the pain?"  (e.g., Scale 1-10; mild, moderate, or severe)    - MILD (1-3): doesn't interfere with normal activities, abdomen soft and not tender to touch     - MODERATE (4-7): interferes with normal activities or awakens from sleep, tender to touch     - SEVERE (8-10): excruciating pain, doubled over, unable to do any normal activities       Severe   6. PREGNANCY: "Could you be pregnant?" "Are you sexually active?"      No   7. BREASTFEEDING: "Are you breastfeeding?"      No   8. HORMONES: "Are you taking any hormone medications, prescription or OTC?" (e.g., birth control pills, estrogen)      No   9. BLOOD THINNERS: "Do you take any blood thinners?" (e.g., Coumadin/warfarin, Pradaxa/dabigatran, aspirin)      ASA   10. CAUSE: "What do you think is causing the bleeding?" (e.g., recent gyn surgery, recent gyn procedure; known bleeding disorder, cervical cancer, polycystic ovarian disease, fibroids)          Unknown   11. " "HEMODYNAMIC STATUS: "Are you weak or feeling lightheaded?" If so, ask: "Can you stand and walk normally?"         Otherwise normal   12. OTHER SYMPTOMS: "What other symptoms are you having with the bleeding?" (e.g., passed tissue, vaginal discharge, fever, menstrual-type cramps)        No    Protocols used: ST VAGINAL BLEEDING - [id number]    "

## 2017-10-20 NOTE — TELEPHONE ENCOUNTER
"She c/o pt having a mixture of dark and bright red blood. Passing blood clots and liquids. She is not sure the size of the clots. Watery stools starting this morning. Numbness in rt leg. Nausea and loss of appetite. No vomiting. No pain other than in knees. Pt went to [facility] last night per Toby "she was informed by physician they can see clots during pelvic exam. There is nothing more they can do". Pt blood levels are normal. I advised toby to bring pt to the ED to be evaluated. She is going to bring her to [facility].    Pt is scheduled to have a ANTONIO/BSO on Monday 10/30.    Toby advised Dr. Buitrago is not in clinic until Monday. Message will be forward to partners. She voiced understanding       "

## 2017-10-23 ENCOUNTER — TELEPHONE (OUTPATIENT)
Dept: GYNECOLOGIC ONCOLOGY | Facility: CLINIC | Age: 67
End: 2017-10-23

## 2017-10-23 NOTE — TELEPHONE ENCOUNTER
Per Dr. Buitrago, pt daughter is aware bleeding is due to pt issue and surgery is going to helped the bleeding. Karlee informed pt does not need additional testing and do not need to see Dr. Buitrago before surgery. She voiced understanding

## 2017-10-23 NOTE — TELEPHONE ENCOUNTER
----- Message from Carina Gonzalez sent at 10/23/2017  3:22 PM CDT -----  Contact: pt's daughter toby [phone]  _  1st Request  _  2nd Request  _  3rd Request        Who: pt's daughter toby [phone]    Why: Requesting a call back in regards to to her condition. Please call the daughter    What Number to Call Back:pt's daughter toby [phone]    When to Expect a call back: (Within 24 hours)    Please return the call at earliest convenience. Thanks!

## 2017-10-24 ENCOUNTER — TELEPHONE (OUTPATIENT)
Dept: GYNECOLOGIC ONCOLOGY | Facility: CLINIC | Age: 67
End: 2017-10-24

## 2017-10-25 ENCOUNTER — TELEPHONE (OUTPATIENT)
Dept: GYNECOLOGIC ONCOLOGY | Facility: CLINIC | Age: 67
End: 2017-10-25

## 2017-10-25 NOTE — TELEPHONE ENCOUNTER
Spoke with pt daughter Karlee. She is aware there is another form that needs to be signed by pt before procedure Monday, insurance company needs form no later than tomorrow. Form was fax to Karlee. Form signed and faxed to pre-service department to finish approval.

## 2017-10-27 ENCOUNTER — TELEPHONE (OUTPATIENT)
Dept: GYNECOLOGIC ONCOLOGY | Facility: CLINIC | Age: 67
End: 2017-10-27

## 2017-10-27 NOTE — TELEPHONE ENCOUNTER
Called and left a message via voice mail for Mrs Sparks of her new surgery arrival time for 10/3/017 at 6:25am. I also called pt's daughter Karlee to confirm her mother's new surgery arrival time. Karlee said she would be the one bringing her mom and they would arrive for 6:25 on 10/30/17.  MA/LPN

## 2017-10-27 NOTE — TELEPHONE ENCOUNTER
Left message informing pt and daughter toby of surgery arrival time and location on Monday 10/30/17. They were advised in message to go to the second floor surgery center in the main hospital for 9:55 am and to contact office confirming message was received.

## 2017-10-30 ENCOUNTER — HOSPITAL ENCOUNTER (INPATIENT)
Facility: HOSPITAL | Age: 67
LOS: 2 days | Discharge: HOME | DRG: 743 | End: 2017-11-01
Attending: OBSTETRICS & GYNECOLOGY | Admitting: OBSTETRICS & GYNECOLOGY
Payer: MEDICAID

## 2017-10-30 ENCOUNTER — SURGERY (OUTPATIENT)
Age: 67
End: 2017-10-30

## 2017-10-30 ENCOUNTER — ANESTHESIA (OUTPATIENT)
Dept: SURGERY | Facility: HOSPITAL | Age: 67
DRG: 743 | End: 2017-10-30
Payer: MEDICAID

## 2017-10-30 DIAGNOSIS — F41.9 ANXIETY DISORDER, UNSPECIFIED TYPE: ICD-10-CM

## 2017-10-30 DIAGNOSIS — R19.00 PELVIC MASS IN FEMALE: ICD-10-CM

## 2017-10-30 DIAGNOSIS — Z98.890 S/P EXPLORATORY LAPAROTOMY: Primary | ICD-10-CM

## 2017-10-30 DIAGNOSIS — F32.A DEPRESSION, UNSPECIFIED DEPRESSION TYPE: Chronic | ICD-10-CM

## 2017-10-30 DIAGNOSIS — F43.10 PTSD (POST-TRAUMATIC STRESS DISORDER): ICD-10-CM

## 2017-10-30 DIAGNOSIS — M70.61 TROCHANTERIC BURSITIS OF RIGHT HIP: ICD-10-CM

## 2017-10-30 LAB
ABO + RH BLD: NORMAL
ANION GAP SERPL CALC-SCNC: 9 MMOL/L
BLD GP AB SCN CELLS X3 SERPL QL: NORMAL
BUN SERPL-MCNC: 10 MG/DL
CALCIUM SERPL-MCNC: 8.2 MG/DL
CHLORIDE SERPL-SCNC: 104 MMOL/L
CO2 SERPL-SCNC: 24 MMOL/L
CREAT SERPL-MCNC: 0.7 MG/DL
EST. GFR  (AFRICAN AMERICAN): >60 ML/MIN/1.73 M^2
EST. GFR  (NON AFRICAN AMERICAN): >60 ML/MIN/1.73 M^2
GLUCOSE SERPL-MCNC: 123 MG/DL
POTASSIUM SERPL-SCNC: 3.7 MMOL/L
SODIUM SERPL-SCNC: 137 MMOL/L

## 2017-10-30 PROCEDURE — 3E0P05Z INTRODUCTION OF ADHESION BARRIER INTO FEMALE REPRODUCTIVE, OPEN APPROACH: ICD-10-PCS | Performed by: OBSTETRICS & GYNECOLOGY

## 2017-10-30 PROCEDURE — 37000008 HC ANESTHESIA 1ST 15 MINUTES: Performed by: OBSTETRICS & GYNECOLOGY

## 2017-10-30 PROCEDURE — 71000033 HC RECOVERY, INTIAL HOUR: Performed by: OBSTETRICS & GYNECOLOGY

## 2017-10-30 PROCEDURE — 86901 BLOOD TYPING SEROLOGIC RH(D): CPT

## 2017-10-30 PROCEDURE — 94760 N-INVAS EAR/PLS OXIMETRY 1: CPT

## 2017-10-30 PROCEDURE — 63600175 PHARM REV CODE 636 W HCPCS: Performed by: ANESTHESIOLOGY

## 2017-10-30 PROCEDURE — 88307 TISSUE EXAM BY PATHOLOGIST: CPT | Mod: 26,,,

## 2017-10-30 PROCEDURE — C9290 INJ, BUPIVACAINE LIPOSOME: HCPCS | Performed by: OBSTETRICS & GYNECOLOGY

## 2017-10-30 PROCEDURE — 80048 BASIC METABOLIC PNL TOTAL CA: CPT

## 2017-10-30 PROCEDURE — 94799 UNLISTED PULMONARY SVC/PX: CPT

## 2017-10-30 PROCEDURE — 20600001 HC STEP DOWN PRIVATE ROOM

## 2017-10-30 PROCEDURE — 86920 COMPATIBILITY TEST SPIN: CPT

## 2017-10-30 PROCEDURE — D9220A PRA ANESTHESIA: Mod: ,,, | Performed by: ANESTHESIOLOGY

## 2017-10-30 PROCEDURE — 86900 BLOOD TYPING SEROLOGIC ABO: CPT

## 2017-10-30 PROCEDURE — 37000009 HC ANESTHESIA EA ADD 15 MINS: Performed by: OBSTETRICS & GYNECOLOGY

## 2017-10-30 PROCEDURE — 0UT90ZZ RESECTION OF UTERUS, OPEN APPROACH: ICD-10-PCS | Performed by: OBSTETRICS & GYNECOLOGY

## 2017-10-30 PROCEDURE — 36000709 HC OR TIME LEV III EA ADD 15 MIN: Performed by: OBSTETRICS & GYNECOLOGY

## 2017-10-30 PROCEDURE — 88305 TISSUE EXAM BY PATHOLOGIST: CPT | Mod: 26,,,

## 2017-10-30 PROCEDURE — 63600175 PHARM REV CODE 636 W HCPCS: Performed by: OBSTETRICS & GYNECOLOGY

## 2017-10-30 PROCEDURE — 25000003 PHARM REV CODE 250: Performed by: STUDENT IN AN ORGANIZED HEALTH CARE EDUCATION/TRAINING PROGRAM

## 2017-10-30 PROCEDURE — 36415 COLL VENOUS BLD VENIPUNCTURE: CPT

## 2017-10-30 PROCEDURE — 71000039 HC RECOVERY, EACH ADD'L HOUR: Performed by: OBSTETRICS & GYNECOLOGY

## 2017-10-30 PROCEDURE — 25000003 PHARM REV CODE 250: Performed by: OBSTETRICS & GYNECOLOGY

## 2017-10-30 PROCEDURE — 88305 TISSUE EXAM BY PATHOLOGIST: CPT

## 2017-10-30 PROCEDURE — 58150 TOTAL HYSTERECTOMY: CPT | Mod: 22,,, | Performed by: OBSTETRICS & GYNECOLOGY

## 2017-10-30 PROCEDURE — 25000003 PHARM REV CODE 250: Performed by: ANESTHESIOLOGY

## 2017-10-30 PROCEDURE — 0UTC0ZZ RESECTION OF CERVIX, OPEN APPROACH: ICD-10-PCS | Performed by: OBSTETRICS & GYNECOLOGY

## 2017-10-30 PROCEDURE — C1765 ADHESION BARRIER: HCPCS | Performed by: OBSTETRICS & GYNECOLOGY

## 2017-10-30 PROCEDURE — 36000708 HC OR TIME LEV III 1ST 15 MIN: Performed by: OBSTETRICS & GYNECOLOGY

## 2017-10-30 PROCEDURE — 63600175 PHARM REV CODE 636 W HCPCS: Performed by: STUDENT IN AN ORGANIZED HEALTH CARE EDUCATION/TRAINING PROGRAM

## 2017-10-30 DEVICE — BARRIER SEPRAFILM ADHESION: Type: IMPLANTABLE DEVICE | Site: ABDOMEN | Status: FUNCTIONAL

## 2017-10-30 RX ORDER — HYDRALAZINE HYDROCHLORIDE 20 MG/ML
5 INJECTION INTRAMUSCULAR; INTRAVENOUS ONCE
Status: COMPLETED | OUTPATIENT
Start: 2017-10-30 | End: 2017-10-30

## 2017-10-30 RX ORDER — DIPHENHYDRAMINE HCL 25 MG
25 CAPSULE ORAL EVERY 6 HOURS PRN
Status: DISCONTINUED | OUTPATIENT
Start: 2017-10-30 | End: 2017-10-31

## 2017-10-30 RX ORDER — MIDAZOLAM HYDROCHLORIDE 1 MG/ML
INJECTION, SOLUTION INTRAMUSCULAR; INTRAVENOUS
Status: DISCONTINUED | OUTPATIENT
Start: 2017-10-30 | End: 2017-10-30

## 2017-10-30 RX ORDER — PROPOFOL 10 MG/ML
VIAL (ML) INTRAVENOUS
Status: DISCONTINUED | OUTPATIENT
Start: 2017-10-30 | End: 2017-10-30

## 2017-10-30 RX ORDER — ONDANSETRON 2 MG/ML
4 INJECTION INTRAMUSCULAR; INTRAVENOUS EVERY 12 HOURS PRN
Status: DISCONTINUED | OUTPATIENT
Start: 2017-10-30 | End: 2017-11-01 | Stop reason: HOSPADM

## 2017-10-30 RX ORDER — ACETAMINOPHEN 10 MG/ML
INJECTION, SOLUTION INTRAVENOUS
Status: DISCONTINUED | OUTPATIENT
Start: 2017-10-30 | End: 2017-10-30

## 2017-10-30 RX ORDER — ALBUTEROL SULFATE 90 UG/1
2 AEROSOL, METERED RESPIRATORY (INHALATION) EVERY 6 HOURS PRN
Status: DISCONTINUED | OUTPATIENT
Start: 2017-10-30 | End: 2017-11-01 | Stop reason: HOSPADM

## 2017-10-30 RX ORDER — HYDROMORPHONE HYDROCHLORIDE 1 MG/ML
0.2 INJECTION, SOLUTION INTRAMUSCULAR; INTRAVENOUS; SUBCUTANEOUS EVERY 5 MIN PRN
Status: DISCONTINUED | OUTPATIENT
Start: 2017-10-30 | End: 2017-10-30

## 2017-10-30 RX ORDER — AMOXICILLIN 250 MG
1 CAPSULE ORAL 2 TIMES DAILY
Status: DISCONTINUED | OUTPATIENT
Start: 2017-10-30 | End: 2017-11-01 | Stop reason: HOSPADM

## 2017-10-30 RX ORDER — ACETAMINOPHEN 10 MG/ML
1000 INJECTION, SOLUTION INTRAVENOUS EVERY 8 HOURS
Status: COMPLETED | OUTPATIENT
Start: 2017-10-30 | End: 2017-10-31

## 2017-10-30 RX ORDER — FLUTICASONE FUROATE AND VILANTEROL 100; 25 UG/1; UG/1
1 POWDER RESPIRATORY (INHALATION) EVERY MORNING
Status: DISCONTINUED | OUTPATIENT
Start: 2017-10-30 | End: 2017-11-01 | Stop reason: HOSPADM

## 2017-10-30 RX ORDER — ASPIRIN 81 MG/1
81 TABLET ORAL EVERY MORNING
Status: DISCONTINUED | OUTPATIENT
Start: 2017-10-30 | End: 2017-11-01 | Stop reason: HOSPADM

## 2017-10-30 RX ORDER — NEOSTIGMINE METHYLSULFATE 1 MG/ML
INJECTION, SOLUTION INTRAVENOUS
Status: DISCONTINUED | OUTPATIENT
Start: 2017-10-30 | End: 2017-10-30

## 2017-10-30 RX ORDER — SUMATRIPTAN 50 MG/1
50 TABLET, FILM COATED ORAL
Status: DISCONTINUED | OUTPATIENT
Start: 2017-10-30 | End: 2017-11-01 | Stop reason: HOSPADM

## 2017-10-30 RX ORDER — DEXTROSE MONOHYDRATE, SODIUM CHLORIDE, AND POTASSIUM CHLORIDE 50; 1.49; 4.5 G/1000ML; G/1000ML; G/1000ML
INJECTION, SOLUTION INTRAVENOUS CONTINUOUS
Status: DISCONTINUED | OUTPATIENT
Start: 2017-10-30 | End: 2017-11-01 | Stop reason: HOSPADM

## 2017-10-30 RX ORDER — SIMETHICONE 80 MG
1 TABLET,CHEWABLE ORAL 3 TIMES DAILY PRN
Status: DISCONTINUED | OUTPATIENT
Start: 2017-10-30 | End: 2017-11-01 | Stop reason: HOSPADM

## 2017-10-30 RX ORDER — HYDROMORPHONE HYDROCHLORIDE 1 MG/ML
0.2 INJECTION, SOLUTION INTRAMUSCULAR; INTRAVENOUS; SUBCUTANEOUS EVERY 5 MIN PRN
Status: COMPLETED | OUTPATIENT
Start: 2017-10-30 | End: 2017-10-30

## 2017-10-30 RX ORDER — ONDANSETRON 2 MG/ML
4 INJECTION INTRAMUSCULAR; INTRAVENOUS ONCE AS NEEDED
Status: DISCONTINUED | OUTPATIENT
Start: 2017-10-30 | End: 2017-10-30

## 2017-10-30 RX ORDER — HYDROMORPHONE HYDROCHLORIDE 1 MG/ML
0.5 INJECTION, SOLUTION INTRAMUSCULAR; INTRAVENOUS; SUBCUTANEOUS
Status: DISCONTINUED | OUTPATIENT
Start: 2017-10-30 | End: 2017-11-01

## 2017-10-30 RX ORDER — ONDANSETRON 2 MG/ML
INJECTION INTRAMUSCULAR; INTRAVENOUS
Status: DISCONTINUED | OUTPATIENT
Start: 2017-10-30 | End: 2017-10-30

## 2017-10-30 RX ORDER — PROMETHAZINE HYDROCHLORIDE 25 MG/ML
INJECTION, SOLUTION INTRAMUSCULAR; INTRAVENOUS
Status: DISPENSED
Start: 2017-10-30 | End: 2017-10-31

## 2017-10-30 RX ORDER — LIDOCAINE HYDROCHLORIDE 10 MG/ML
1 INJECTION, SOLUTION EPIDURAL; INFILTRATION; INTRACAUDAL; PERINEURAL ONCE
Status: COMPLETED | OUTPATIENT
Start: 2017-10-30 | End: 2017-10-30

## 2017-10-30 RX ORDER — ROCURONIUM BROMIDE 10 MG/ML
INJECTION, SOLUTION INTRAVENOUS
Status: DISCONTINUED | OUTPATIENT
Start: 2017-10-30 | End: 2017-10-30

## 2017-10-30 RX ORDER — ONDANSETRON 2 MG/ML
4 INJECTION INTRAMUSCULAR; INTRAVENOUS DAILY PRN
Status: DISCONTINUED | OUTPATIENT
Start: 2017-10-30 | End: 2017-10-30

## 2017-10-30 RX ORDER — SODIUM CHLORIDE 9 MG/ML
INJECTION, SOLUTION INTRAVENOUS CONTINUOUS
Status: DISCONTINUED | OUTPATIENT
Start: 2017-10-30 | End: 2017-10-30

## 2017-10-30 RX ORDER — ATORVASTATIN CALCIUM 20 MG/1
20 TABLET, FILM COATED ORAL EVERY MORNING
Status: DISCONTINUED | OUTPATIENT
Start: 2017-10-30 | End: 2017-11-01 | Stop reason: HOSPADM

## 2017-10-30 RX ORDER — PANTOPRAZOLE SODIUM 40 MG/1
40 TABLET, DELAYED RELEASE ORAL EVERY MORNING
Status: DISCONTINUED | OUTPATIENT
Start: 2017-10-30 | End: 2017-11-01 | Stop reason: HOSPADM

## 2017-10-30 RX ORDER — LABETALOL HYDROCHLORIDE 5 MG/ML
INJECTION, SOLUTION INTRAVENOUS
Status: DISCONTINUED | OUTPATIENT
Start: 2017-10-30 | End: 2017-10-30

## 2017-10-30 RX ORDER — FENTANYL CITRATE 50 UG/ML
INJECTION, SOLUTION INTRAMUSCULAR; INTRAVENOUS
Status: DISCONTINUED | OUTPATIENT
Start: 2017-10-30 | End: 2017-10-30

## 2017-10-30 RX ORDER — ESMOLOL HYDROCHLORIDE 10 MG/ML
INJECTION INTRAVENOUS
Status: DISCONTINUED | OUTPATIENT
Start: 2017-10-30 | End: 2017-10-30

## 2017-10-30 RX ORDER — GLYCOPYRROLATE 0.2 MG/ML
INJECTION INTRAMUSCULAR; INTRAVENOUS
Status: DISCONTINUED | OUTPATIENT
Start: 2017-10-30 | End: 2017-10-30

## 2017-10-30 RX ORDER — MUPIROCIN 20 MG/G
1 OINTMENT TOPICAL 2 TIMES DAILY
Status: DISCONTINUED | OUTPATIENT
Start: 2017-10-30 | End: 2017-11-01 | Stop reason: HOSPADM

## 2017-10-30 RX ORDER — ESCITALOPRAM OXALATE 10 MG/1
20 TABLET ORAL EVERY MORNING
Status: DISCONTINUED | OUTPATIENT
Start: 2017-10-30 | End: 2017-11-01 | Stop reason: HOSPADM

## 2017-10-30 RX ORDER — DIPHENHYDRAMINE HYDROCHLORIDE 50 MG/ML
25 INJECTION INTRAMUSCULAR; INTRAVENOUS EVERY 6 HOURS PRN
Status: DISCONTINUED | OUTPATIENT
Start: 2017-10-30 | End: 2017-10-30

## 2017-10-30 RX ORDER — LIDOCAINE HCL/PF 100 MG/5ML
SYRINGE (ML) INTRAVENOUS
Status: DISCONTINUED | OUTPATIENT
Start: 2017-10-30 | End: 2017-10-30

## 2017-10-30 RX ORDER — SODIUM CHLORIDE 0.9 % (FLUSH) 0.9 %
3 SYRINGE (ML) INJECTION
Status: DISCONTINUED | OUTPATIENT
Start: 2017-10-30 | End: 2017-10-30

## 2017-10-30 RX ADMIN — IBUPROFEN 800 MG: 800 INJECTION INTRAVENOUS at 05:10

## 2017-10-30 RX ADMIN — DEXTROSE MONOHYDRATE, SODIUM CHLORIDE, AND POTASSIUM CHLORIDE: 50; 4.5; 1.49 INJECTION, SOLUTION INTRAVENOUS at 11:10

## 2017-10-30 RX ADMIN — ACETAMINOPHEN 1000 MG: 10 INJECTION, SOLUTION INTRAVENOUS at 03:10

## 2017-10-30 RX ADMIN — BUPIVACAINE 20 ML: 13.3 INJECTION, SUSPENSION, LIPOSOMAL INFILTRATION at 10:10

## 2017-10-30 RX ADMIN — ESMOLOL HYDROCHLORIDE 10 MG: 10 INJECTION INTRAVENOUS at 09:10

## 2017-10-30 RX ADMIN — HYDROMORPHONE HYDROCHLORIDE 0.2 MG: 1 INJECTION, SOLUTION INTRAMUSCULAR; INTRAVENOUS; SUBCUTANEOUS at 12:10

## 2017-10-30 RX ADMIN — IBUPROFEN 800 MG: 800 INJECTION INTRAVENOUS at 11:10

## 2017-10-30 RX ADMIN — MIDAZOLAM HYDROCHLORIDE 2 MG: 1 INJECTION, SOLUTION INTRAMUSCULAR; INTRAVENOUS at 08:10

## 2017-10-30 RX ADMIN — PROMETHAZINE HYDROCHLORIDE 6.25 MG: 25 INJECTION INTRAMUSCULAR; INTRAVENOUS at 12:10

## 2017-10-30 RX ADMIN — ATORVASTATIN CALCIUM 20 MG: 20 TABLET, FILM COATED ORAL at 11:10

## 2017-10-30 RX ADMIN — Medication 2 G: at 08:10

## 2017-10-30 RX ADMIN — FENTANYL CITRATE 50 MCG: 50 INJECTION, SOLUTION INTRAMUSCULAR; INTRAVENOUS at 09:10

## 2017-10-30 RX ADMIN — FENTANYL CITRATE 150 MCG: 50 INJECTION, SOLUTION INTRAMUSCULAR; INTRAVENOUS at 08:10

## 2017-10-30 RX ADMIN — PROPOFOL 150 MG: 10 INJECTION, EMULSION INTRAVENOUS at 08:10

## 2017-10-30 RX ADMIN — HYDROMORPHONE HYDROCHLORIDE 0.2 MG: 1 INJECTION, SOLUTION INTRAMUSCULAR; INTRAVENOUS; SUBCUTANEOUS at 01:10

## 2017-10-30 RX ADMIN — ESCITALOPRAM 20 MG: 20 TABLET, FILM COATED ORAL at 11:10

## 2017-10-30 RX ADMIN — ROCURONIUM BROMIDE 50 MG: 10 INJECTION, SOLUTION INTRAVENOUS at 08:10

## 2017-10-30 RX ADMIN — ROCURONIUM BROMIDE 20 MG: 10 INJECTION, SOLUTION INTRAVENOUS at 10:10

## 2017-10-30 RX ADMIN — MUPIROCIN 1 G: 20 OINTMENT TOPICAL at 11:10

## 2017-10-30 RX ADMIN — ACETAMINOPHEN 1000 MG: 10 INJECTION, SOLUTION INTRAVENOUS at 09:10

## 2017-10-30 RX ADMIN — ESMOLOL HYDROCHLORIDE 20 MG: 10 INJECTION INTRAVENOUS at 09:10

## 2017-10-30 RX ADMIN — LABETALOL HYDROCHLORIDE 5 MG: 5 INJECTION, SOLUTION INTRAVENOUS at 09:10

## 2017-10-30 RX ADMIN — ONDANSETRON 4 MG: 2 INJECTION, SOLUTION INTRAMUSCULAR; INTRAVENOUS at 08:10

## 2017-10-30 RX ADMIN — HYDRALAZINE HYDROCHLORIDE 5 MG: 20 INJECTION INTRAMUSCULAR; INTRAVENOUS at 09:10

## 2017-10-30 RX ADMIN — HYDROMORPHONE HYDROCHLORIDE 0.2 MG: 1 INJECTION, SOLUTION INTRAMUSCULAR; INTRAVENOUS; SUBCUTANEOUS at 11:10

## 2017-10-30 RX ADMIN — LIDOCAINE HYDROCHLORIDE 100 MG: 20 INJECTION, SOLUTION INTRAVENOUS at 08:10

## 2017-10-30 RX ADMIN — ASPIRIN 81 MG: 81 TABLET, COATED ORAL at 11:10

## 2017-10-30 RX ADMIN — ONDANSETRON 4 MG: 2 INJECTION INTRAMUSCULAR; INTRAVENOUS at 11:10

## 2017-10-30 RX ADMIN — ROCURONIUM BROMIDE 10 MG: 10 INJECTION, SOLUTION INTRAVENOUS at 09:10

## 2017-10-30 RX ADMIN — FENTANYL CITRATE 25 MCG: 50 INJECTION, SOLUTION INTRAMUSCULAR; INTRAVENOUS at 11:10

## 2017-10-30 RX ADMIN — STANDARDIZED SENNA CONCENTRATE AND DOCUSATE SODIUM 1 TABLET: 8.6; 5 TABLET, FILM COATED ORAL at 09:10

## 2017-10-30 RX ADMIN — MUPIROCIN 1 G: 20 OINTMENT TOPICAL at 10:10

## 2017-10-30 RX ADMIN — LIDOCAINE HYDROCHLORIDE 0.1 MG: 10 INJECTION, SOLUTION EPIDURAL; INFILTRATION; INTRACAUDAL; PERINEURAL at 08:10

## 2017-10-30 RX ADMIN — LABETALOL HYDROCHLORIDE 5 MG: 5 INJECTION, SOLUTION INTRAVENOUS at 10:10

## 2017-10-30 RX ADMIN — DEXTROSE MONOHYDRATE, SODIUM CHLORIDE, AND POTASSIUM CHLORIDE: 50; 4.5; 1.49 INJECTION, SOLUTION INTRAVENOUS at 12:10

## 2017-10-30 RX ADMIN — HYDROMORPHONE HYDROCHLORIDE 0.5 MG: 1 INJECTION, SOLUTION INTRAMUSCULAR; INTRAVENOUS; SUBCUTANEOUS at 08:10

## 2017-10-30 RX ADMIN — GLYCOPYRROLATE 0.6 MG: 0.2 INJECTION, SOLUTION INTRAMUSCULAR; INTRAVENOUS at 11:10

## 2017-10-30 RX ADMIN — NEOSTIGMINE METHYLSULFATE 5 MG: 1 INJECTION INTRAVENOUS at 11:10

## 2017-10-30 RX ADMIN — PROMETHAZINE HYDROCHLORIDE 6.25 MG: 25 INJECTION INTRAMUSCULAR; INTRAVENOUS at 05:10

## 2017-10-30 RX ADMIN — ESMOLOL HYDROCHLORIDE 30 MG: 10 INJECTION INTRAVENOUS at 08:10

## 2017-10-30 RX ADMIN — SODIUM CHLORIDE, SODIUM GLUCONATE, SODIUM ACETATE, POTASSIUM CHLORIDE, MAGNESIUM CHLORIDE, SODIUM PHOSPHATE, DIBASIC, AND POTASSIUM PHOSPHATE: .53; .5; .37; .037; .03; .012; .00082 INJECTION, SOLUTION INTRAVENOUS at 09:10

## 2017-10-30 RX ADMIN — STANDARDIZED SENNA CONCENTRATE AND DOCUSATE SODIUM 1 TABLET: 8.6; 5 TABLET, FILM COATED ORAL at 11:10

## 2017-10-30 RX ADMIN — SODIUM CHLORIDE 1000 ML: 0.9 INJECTION, SOLUTION INTRAVENOUS at 08:10

## 2017-10-30 RX ADMIN — PANTOPRAZOLE SODIUM 40 MG: 40 TABLET, DELAYED RELEASE ORAL at 11:10

## 2017-10-30 RX ADMIN — ACETAMINOPHEN 1000 MG: 10 INJECTION, SOLUTION INTRAVENOUS at 10:10

## 2017-10-30 NOTE — HOSPITAL COURSE
10/30/2017 - Underwent ANTONIO as scheduled, uncomplicated. See op note for details.   10/31/2017 - POD#1, no acute events. Meeting appropriate post-op milestones. Psychiatry consulted for recurrent panic attacks and depression, recommend continue Lexapro. Orthopedics consulted for knee pain, recommend NSAIDs and outpatient PT.   11/01/2017 - POD#2. No acute events; tolerating regular diet, pain well controlled, ambulating and voiding without issue, +flatus. Patient ready for discharge with follow-up with Dr. Buitrago.

## 2017-10-30 NOTE — H&P (VIEW-ONLY)
"Subjective:       Patient ID: Lindy Sparks is a 67 y.o. female.    Chief Complaint: Pelvic Mass (Consult )    HPI     67yr old para 3 ( x 3) presents as a referral from Dr. Perry secondary to a pelvic mass.  Was visiting in [place] in 2017 and had pelvic pain radiating to back. Seen at [facility] ED and CT scan of abdomen and pelvis was done. This showed a 8.1 x 7.6 cm rounded mass noted within the vagina and appeared contiguous with the cervix. Patient has copy of report but no images.     10/3/2017 saw Dr. Maximilian Perry with complaint of pelvic pain.   US done that showed a midline pelvic mass 11 x 6 cm.     Referred for further management.       Also seen at [facility] in 2017 for WWE. Normal exam. Pessary was changed.   Seen in 2017 at [facility] for vaginitis. US showed , the uterus appears within normal in size measuring 5.9 x 3.5 x 2.2 cm with hypoechoic area noted over the posterior fundus adjacent to the endometrial canal measuring 5 x 4.4 mm, being avascular and possibly a small fibroid. In the cervical region, a 2 x 1.4 x 0.7 cm prevascular lesion is noted consistent with probable nabothian cyst. Endometrial canal is minimally thickened measuring 3 mm in AP dimension and complex, possibly due to cyclical change.  Neither ovary could be visualized. No adnexal mass nor pelvic fluid could be detected.  Impressions:  Small hypoechoic area noted over the posterior uterine fundus adjacent to the endometrial canal, possibly a small fibroid. Probable nabothian type cyst in the cervical region.  No definite further abnormality could be detected, although neither ovary could be visualized.    Biopsy of lesion on posterior vagina was normal.    ED visit 2017 with inferior sternal pain. Resolved with pain medication and patient discharged 2 hours later.     Today reports pelvic pain that has caused her to lose her voice. Feels like "something is coming out" (pessary not in " "currently) but also feels pressure in her groin. No VB. LMP 20yrs ago.    Last pap 2016, normal. No history of abnormal pap smears.    PMH: CAD, HTN depression, migraines    PSH: lap debi, ectopic (LTV)    FH: F-lung CA (tobacco). No breast, ovarian or colon CA.    No tobacco use.    MMG 2017 with benign appearing small axillary lymph nodes    Colonoscopy  normal    Past Medical History:   Diagnosis Date    Acid reflux     Anxiety     Arthritis     Coronary artery disease     Depression     Ectopic pregnancy     Hypertension     Migraine     Pelvic mass in female 10/8/2017     Past Surgical History:   Procedure Laterality Date     SECTION      CHOLECYSTECTOMY      COLONOSCOPY      ECTOPIC PREGNANCY SURGERY      TONSILLECTOMY       Family History   Problem Relation Age of Onset    Hyperlipidemia Mother     Stroke Mother     Heart disease Father     Stroke Father     Stroke Brother     No Known Problems Daughter     No Known Problems Son     No Known Problems Daughter      Social History   Substance Use Topics    Smoking status: Never Smoker    Smokeless tobacco: Never Used    Alcohol use No      Comment: "maybe like twice a year"     Review of patient's allergies indicates:   Allergen Reactions    Buspar [buspirone] Other (See Comments)     Suicidal thoughts    Lisinopril Other (See Comments)     Unsure of reaction    Naproxen Other (See Comments)     Weakness, dizziness, chest pain    Pravastatin sodium      Chest pain  And headache       Current Outpatient Prescriptions:     albuterol (PROVENTIL) 2.5 mg /3 mL (0.083 %) nebulizer solution, Take 3 mLs (2.5 mg total) by nebulization every 6 (six) hours as needed for Wheezing., Disp: 300 mL, Rfl: 11    albuterol (VENTOLIN HFA) 90 mcg/actuation inhaler, INHALE 2 PUFFS INTO THE LUNGS EVERY 6 HOURS AS NEEDED WHEEZING, Disp: 18 g, Rfl: 0    aspirin (ECOTRIN) 81 MG EC tablet, Take 1 tablet (81 mg total) by mouth once daily., " Disp: 30 tablet, Rfl: 5    atorvastatin (LIPITOR) 20 MG tablet, Take 1 tablet (20 mg total) by mouth once daily., Disp: 30 tablet, Rfl: 5    CARTIA  mg 24 hr capsule, TK 1 C PO ONCE D, Disp: , Rfl: 11    conjugated estrogens (PREMARIN) vaginal cream, Place a pea-sized amount in vagina every other night. Patient unsure of dose, Disp: 45 g, Rfl: 02    DOCUSATE CALCIUM (STOOL SOFTENER ORAL), Take by mouth. Patient unsure of dose, Disp: , Rfl:     ergocalciferol (ERGOCALCIFEROL) 50,000 unit Cap, Take 1 capsule (50,000 Units total) by mouth every 7 days., Disp: 4 capsule, Rfl: 5    escitalopram oxalate (LEXAPRO) 20 MG tablet, Take 1 tablet (20 mg total) by mouth once daily., Disp: 30 tablet, Rfl: 5    fluticasone-vilanterol (BREO) 100-25 mcg/dose diskus inhaler, Inhale 1 puff into the lungs once daily. Controller, Disp: 28 each, Rfl: 5    gabapentin (NEURONTIN) 400 MG capsule, Take 1 capsule (400 mg total) by mouth every evening., Disp: 30 capsule, Rfl: 5    hydrochlorothiazide (MICROZIDE) 12.5 mg capsule, TAKE 1 CAPSULE BY MOUTH EVERY DAY, Disp: 30 capsule, Rfl: 5    hydrocodone-acetaminophen 7.5-325mg (NORCO) 7.5-325 mg per tablet, Take 1 tablet by mouth every 12 (twelve) hours as needed. g43.709, Disp: 60 tablet, Rfl: 0    hydrOXYzine pamoate (VISTARIL) 25 MG Cap, Take 1 capsule (25 mg total) by mouth every 6 (six) hours as needed., Disp: 120 capsule, Rfl: 3    losartan (COZAAR) 100 MG tablet, Take 1 tablet (100 mg total) by mouth once daily., Disp: 90 tablet, Rfl: 3    methocarbamol (ROBAXIN) 500 MG Tab, , Disp: , Rfl: 2    pantoprazole (PROTONIX) 40 MG tablet, Take 1 tablet (40 mg total) by mouth once daily. For reflux, Disp: 30 tablet, Rfl: 5    Review of Systems   Constitutional: Positive for fatigue. Negative for chills and fever.   Respiratory: Negative for cough, shortness of breath and wheezing.    Cardiovascular: Negative for chest pain, palpitations and leg swelling.   Gastrointestinal:  "Negative for abdominal pain, constipation, diarrhea, nausea and vomiting.   Genitourinary: Positive for frequency, pelvic pain and urgency. Negative for difficulty urinating, dysuria, hematuria, vaginal bleeding, vaginal discharge and vaginal pain.   Musculoskeletal: Positive for back pain.   Skin: Negative for color change and rash.   Neurological: Negative for weakness.   Hematological: Negative for adenopathy. Does not bruise/bleed easily.   Psychiatric/Behavioral: The patient is nervous/anxious.        Objective:     BP (!) 170/93   Pulse 71   Ht 5' 6" (1.676 m)   Wt 88.6 kg (195 lb 5.2 oz)   LMP  (LMP Unknown) Comment: 20 yrs ago, no PMB  BMI 31.53 kg/m²     Physical Exam   Constitutional: She is oriented to person, place, and time. She appears well-developed and well-nourished. No distress.   Neck: Normal range of motion.   Cardiovascular: Normal rate, regular rhythm and normal heart sounds.    No murmur heard.  Pulmonary/Chest: Effort normal and breath sounds normal. No respiratory distress. She has no wheezes. She has no rales.   Abdominal: Soft. She exhibits no distension and no ascites. There is tenderness in the right lower quadrant, suprapubic area and left lower quadrant. There is guarding.   Genitourinary: Vagina normal. No breast tenderness. Pelvic exam was performed with patient supine. There is no rash, tenderness or lesion on the right labia. There is no rash, tenderness or lesion on the left labia. Uterus is enlarged and tender. Right adnexum displays tenderness. Left adnexum displays tenderness. No bleeding in the vagina. No vaginal discharge found.   Genitourinary Comments: 12cm soft, fluid filled mass displacing cervix and DEX, unable to palpate adnexa or discern uterus and adnexa from mass   Musculoskeletal: Normal range of motion. She exhibits no edema or tenderness.   Neurological: She is alert and oriented to person, place, and time.   Skin: Skin is warm and dry. No rash noted. She is " not diaphoretic. No erythema. No pallor.   Psychiatric: She has a normal mood and affect. Her behavior is normal.   Vitals reviewed.      Assessment:       1. Pelvic mass in female    2. Recurrent major depressive disorder, in partial remission    3. SHAW on CPAP    4. Hoarseness    5. Essential hypertension    6. Coronary artery disease involving native coronary artery of native heart without angina pectoris        Plan:   Pelvic mass in female  I have recommended ANTONIO/BSO and staging as indicated by frozen section. Surgery scheduled for 10/30/2017   Consent forms were reviewed with patient. Questions were answered. Patient voiced understanding. Consents were signed.  Preop orders placed.     -     hydrocodone-acetaminophen 7.5-325mg (NORCO) 7.5-325 mg per tablet; Take 1 tablet by mouth every 12 (twelve) hours as needed. g43.709  Dispense: 60 tablet; Refill: 0  -     CBC auto differential; Future; Expected date: 10/10/2017  -     Comprehensive metabolic panel; Future; Expected date: 10/10/2017  -     CT Abdomen Pelvis With Contrast; Future; Expected date: 10/10/2017  -     X-Ray Chest PA And Lateral; Future; Expected date: 10/10/2017    Recurrent major depressive disorder, in partial remission    SHAW on CPAP    Hoarseness  Patient initially had this and was able to only whisper and then only for a short time.  Daughter was answering questions. After signing consents I came back into the office to speak with the patient and family and she was able to talk.   Essential hypertension    Coronary artery disease involving native coronary artery of native heart without angina pectoris  -     EKG 12-lead; Future    - schedule exploratory laparotomy, ANTONIO/BSO, resection of pelvic mass, possible staging pending frozen section  - clearance with IM  - The risks, benefits, and indications for surgery were discussed with the patient. These included bleeding, infection, damage to surrounding tissues, the possibility of bowel or  urologic resection and reconstruction, and the possibility of major complications including death. She voiced understanding, all questions were answered and consents were signed.    Family states that patient had hysterectomy done at time of ectopic pregnancy in the 1990's. All information would suggest that she still has her uterus. Clinical impression is that the mass may be a hematometra.

## 2017-10-30 NOTE — BRIEF OP NOTE
[facility]  Brief Operative Note    SUMMARY     Surgery Date: 10/30/2017     Surgeon(s) and Role:     * Junior Buitrago MD - Primary     * Erica Lisa MD    Assisting Surgeon: None    Pre-op Diagnosis:  Pelvic mass in female [R19.00]    Post-op Diagnosis:  Post-Op Diagnosis Codes:     * Pelvic mass in female [R19.00]    Procedure(s) (LRB):  HYSTERECTOMY-ABDOMINAL-TOTAL (ANTONIO) (N/A)  LYSIS-ADHESION of small bowel adhensions 20 minutes.  LYMPHADENECTOMY (Right)    Anesthesia: General    Description of Procedure: removal of uterus.  Prior BSO    Description of the findings of the procedure: fluid filled cervix with cervical stenosis.     Estimated Blood Loss: 200 mL       Total Fluids: 2000 ml  Urine Output: 1500 ml     Specimens:   Specimen (12h ago through future)    Start     Ordered    10/30/17 1018  Specimen to Pathology - Surgery  Once     Comments:  1.) Uterus & Cervix Perm2.) Right pelvic lymph node Perm      10/30/17 1105

## 2017-10-30 NOTE — TRANSFER OF CARE
"Anesthesia Transfer of Care Note    Patient: Lindy Sparks    Procedure(s) Performed: Procedure(s) (LRB):  HYSTERECTOMY-ABDOMINAL-TOTAL (ANTONIO) (N/A)  LYSIS-ADHESION of small bowel adhensions 20 minutes.  LYMPHADENECTOMY (Right)    Patient location: PACU    Anesthesia Type: general    Transport from OR: Transported from OR on room air with adequate spontaneous ventilation    Post pain: adequate analgesia    Post assessment: no apparent anesthetic complications    Post vital signs: stable    Level of consciousness: awake and alert    Nausea/Vomiting: no nausea/vomiting    Complications: none    Transfer of care protocol was followed      Last vitals:   Visit Vitals  BP (!) 158/85   Pulse 65   Temp 36.4 °C (97.5 °F) (Oral)   Resp 18   Ht 5' 6" (1.676 m)   Wt 87.1 kg (192 lb)   LMP  (LMP Unknown)   Breastfeeding? No   BMI 30.99 kg/m²     "

## 2017-10-30 NOTE — PLAN OF CARE
Pt vital signs wnl.  Pt pain tolerable.  No nausea.  Abdominal dressing intact.  Romo intact with adequate output.

## 2017-10-30 NOTE — INTERVAL H&P NOTE
The patient has been examined and the H&P has been reviewed:    I concur with the findings and no changes have occurred since H&P was written.    Anesthesia/Surgery risks, benefits and alternative options discussed and understood by patient/family.    Latosha Britton MD  OB/GYN, PGY-2        Active Hospital Problems    Diagnosis  POA    Pelvic mass in female [R19.00]  Yes      Resolved Hospital Problems    Diagnosis Date Resolved POA   No resolved problems to display.

## 2017-10-30 NOTE — PLAN OF CARE
"Pt restless and nervous about procedure. Complains of constant pain in left knee. Does not take any pain medication at home bd her pcp will not fill them anymore "as she may have become dependent" - per patient statement.    Call light in reach.   will continue to monitor   "

## 2017-10-30 NOTE — PLAN OF CARE
Problem: Patient Care Overview  Goal: Plan of Care Review  Plan of care reviewed with patient and family member. Patient resting  SCD applied and in use , Romo in place and draining well .  Phenegran given for nausea with good results. Ibuprofen IVPB hanging for post op pain control. Patient VSS stable and patient resting .

## 2017-10-31 LAB
ANION GAP SERPL CALC-SCNC: 8 MMOL/L
BASOPHILS # BLD AUTO: 0.02 K/UL
BASOPHILS NFR BLD: 0.3 %
BUN SERPL-MCNC: 7 MG/DL
CALCIUM SERPL-MCNC: 8.7 MG/DL
CHLORIDE SERPL-SCNC: 103 MMOL/L
CO2 SERPL-SCNC: 26 MMOL/L
CREAT SERPL-MCNC: 0.7 MG/DL
DIFFERENTIAL METHOD: ABNORMAL
EOSINOPHIL # BLD AUTO: 0.1 K/UL
EOSINOPHIL NFR BLD: 0.8 %
ERYTHROCYTE [DISTWIDTH] IN BLOOD BY AUTOMATED COUNT: 14.6 %
EST. GFR  (AFRICAN AMERICAN): >60 ML/MIN/1.73 M^2
EST. GFR  (NON AFRICAN AMERICAN): >60 ML/MIN/1.73 M^2
GLUCOSE SERPL-MCNC: 113 MG/DL
HCT VFR BLD AUTO: 38.6 %
HGB BLD-MCNC: 12.2 G/DL
IMM GRANULOCYTES # BLD AUTO: 0.03 K/UL
IMM GRANULOCYTES NFR BLD AUTO: 0.4 %
LYMPHOCYTES # BLD AUTO: 2.2 K/UL
LYMPHOCYTES NFR BLD: 27.4 %
MCH RBC QN AUTO: 26 PG
MCHC RBC AUTO-ENTMCNC: 31.6 G/DL
MCV RBC AUTO: 82 FL
MONOCYTES # BLD AUTO: 0.7 K/UL
MONOCYTES NFR BLD: 8.3 %
NEUTROPHILS # BLD AUTO: 5 K/UL
NEUTROPHILS NFR BLD: 62.8 %
NRBC BLD-RTO: 0 /100 WBC
PLATELET # BLD AUTO: 207 K/UL
PMV BLD AUTO: 10.2 FL
POTASSIUM SERPL-SCNC: 3.4 MMOL/L
RBC # BLD AUTO: 4.69 M/UL
SODIUM SERPL-SCNC: 137 MMOL/L
WBC # BLD AUTO: 7.86 K/UL

## 2017-10-31 PROCEDURE — 63600175 PHARM REV CODE 636 W HCPCS: Performed by: OBSTETRICS & GYNECOLOGY

## 2017-10-31 PROCEDURE — 25000003 PHARM REV CODE 250: Performed by: OBSTETRICS & GYNECOLOGY

## 2017-10-31 PROCEDURE — 25000242 PHARM REV CODE 250 ALT 637 W/ HCPCS: Performed by: OBSTETRICS & GYNECOLOGY

## 2017-10-31 PROCEDURE — 90792 PSYCH DIAG EVAL W/MED SRVCS: CPT | Mod: AF,HB,, | Performed by: PSYCHIATRY & NEUROLOGY

## 2017-10-31 PROCEDURE — 80048 BASIC METABOLIC PNL TOTAL CA: CPT

## 2017-10-31 PROCEDURE — 25000003 PHARM REV CODE 250: Performed by: STUDENT IN AN ORGANIZED HEALTH CARE EDUCATION/TRAINING PROGRAM

## 2017-10-31 PROCEDURE — 36415 COLL VENOUS BLD VENIPUNCTURE: CPT

## 2017-10-31 PROCEDURE — 99024 POSTOP FOLLOW-UP VISIT: CPT | Mod: ,,, | Performed by: OBSTETRICS & GYNECOLOGY

## 2017-10-31 PROCEDURE — 20600001 HC STEP DOWN PRIVATE ROOM

## 2017-10-31 PROCEDURE — 85025 COMPLETE CBC W/AUTO DIFF WBC: CPT

## 2017-10-31 RX ORDER — POLYETHYLENE GLYCOL 3350 17 G/17G
17 POWDER, FOR SOLUTION ORAL DAILY
Status: DISCONTINUED | OUTPATIENT
Start: 2017-10-31 | End: 2017-11-01 | Stop reason: HOSPADM

## 2017-10-31 RX ORDER — OXYCODONE AND ACETAMINOPHEN 5; 325 MG/1; MG/1
1 TABLET ORAL EVERY 4 HOURS PRN
Status: DISCONTINUED | OUTPATIENT
Start: 2017-10-31 | End: 2017-11-01 | Stop reason: HOSPADM

## 2017-10-31 RX ORDER — DIPHENHYDRAMINE HCL 25 MG
25 CAPSULE ORAL EVERY 6 HOURS PRN
Status: DISCONTINUED | OUTPATIENT
Start: 2017-10-31 | End: 2017-11-01 | Stop reason: HOSPADM

## 2017-10-31 RX ORDER — ALPRAZOLAM 0.25 MG/1
0.5 TABLET ORAL EVERY 8 HOURS PRN
Status: DISCONTINUED | OUTPATIENT
Start: 2017-10-31 | End: 2017-11-01 | Stop reason: HOSPADM

## 2017-10-31 RX ORDER — HYDRALAZINE HYDROCHLORIDE 20 MG/ML
5 INJECTION INTRAMUSCULAR; INTRAVENOUS ONCE
Status: DISCONTINUED | OUTPATIENT
Start: 2017-10-31 | End: 2017-11-01 | Stop reason: HOSPADM

## 2017-10-31 RX ORDER — HYDROCHLOROTHIAZIDE 12.5 MG/1
12.5 TABLET ORAL DAILY
Status: DISCONTINUED | OUTPATIENT
Start: 2017-10-31 | End: 2017-11-01 | Stop reason: HOSPADM

## 2017-10-31 RX ORDER — OXYCODONE AND ACETAMINOPHEN 10; 325 MG/1; MG/1
1 TABLET ORAL EVERY 4 HOURS PRN
Status: DISCONTINUED | OUTPATIENT
Start: 2017-10-31 | End: 2017-11-01 | Stop reason: HOSPADM

## 2017-10-31 RX ORDER — IBUPROFEN 600 MG/1
600 TABLET ORAL EVERY 6 HOURS
Status: DISCONTINUED | OUTPATIENT
Start: 2017-10-31 | End: 2017-11-01 | Stop reason: HOSPADM

## 2017-10-31 RX ORDER — LOSARTAN POTASSIUM 50 MG/1
100 TABLET ORAL EVERY MORNING
Status: DISCONTINUED | OUTPATIENT
Start: 2017-10-31 | End: 2017-11-01 | Stop reason: HOSPADM

## 2017-10-31 RX ORDER — HYDROXYZINE PAMOATE 25 MG/1
25 CAPSULE ORAL EVERY 6 HOURS PRN
Status: DISCONTINUED | OUTPATIENT
Start: 2017-10-31 | End: 2017-10-31

## 2017-10-31 RX ADMIN — MUPIROCIN 1 G: 20 OINTMENT TOPICAL at 09:10

## 2017-10-31 RX ADMIN — HYDROMORPHONE HYDROCHLORIDE 0.5 MG: 1 INJECTION, SOLUTION INTRAMUSCULAR; INTRAVENOUS; SUBCUTANEOUS at 10:10

## 2017-10-31 RX ADMIN — DIPHENHYDRAMINE HYDROCHLORIDE 25 MG: 25 CAPSULE ORAL at 09:10

## 2017-10-31 RX ADMIN — MUPIROCIN 1 G: 20 OINTMENT TOPICAL at 08:10

## 2017-10-31 RX ADMIN — STANDARDIZED SENNA CONCENTRATE AND DOCUSATE SODIUM 1 TABLET: 8.6; 5 TABLET, FILM COATED ORAL at 08:10

## 2017-10-31 RX ADMIN — HYDROMORPHONE HYDROCHLORIDE 0.5 MG: 1 INJECTION, SOLUTION INTRAMUSCULAR; INTRAVENOUS; SUBCUTANEOUS at 01:10

## 2017-10-31 RX ADMIN — HYDROMORPHONE HYDROCHLORIDE 0.5 MG: 1 INJECTION, SOLUTION INTRAMUSCULAR; INTRAVENOUS; SUBCUTANEOUS at 02:10

## 2017-10-31 RX ADMIN — POLYETHYLENE GLYCOL 3350 17 G: 17 POWDER, FOR SOLUTION ORAL at 08:10

## 2017-10-31 RX ADMIN — OXYCODONE AND ACETAMINOPHEN 1 TABLET: 10; 325 TABLET ORAL at 08:10

## 2017-10-31 RX ADMIN — HYDROMORPHONE HYDROCHLORIDE 0.5 MG: 1 INJECTION, SOLUTION INTRAMUSCULAR; INTRAVENOUS; SUBCUTANEOUS at 07:10

## 2017-10-31 RX ADMIN — HYDROMORPHONE HYDROCHLORIDE 0.5 MG: 1 INJECTION, SOLUTION INTRAMUSCULAR; INTRAVENOUS; SUBCUTANEOUS at 11:10

## 2017-10-31 RX ADMIN — DEXTROSE MONOHYDRATE, SODIUM CHLORIDE, AND POTASSIUM CHLORIDE: 50; 4.5; 1.49 INJECTION, SOLUTION INTRAVENOUS at 08:10

## 2017-10-31 RX ADMIN — HYDROMORPHONE HYDROCHLORIDE 0.5 MG: 1 INJECTION, SOLUTION INTRAMUSCULAR; INTRAVENOUS; SUBCUTANEOUS at 04:10

## 2017-10-31 RX ADMIN — IBUPROFEN 600 MG: 600 TABLET, FILM COATED ORAL at 05:10

## 2017-10-31 RX ADMIN — PANTOPRAZOLE SODIUM 40 MG: 40 TABLET, DELAYED RELEASE ORAL at 06:10

## 2017-10-31 RX ADMIN — SIMETHICONE CHEW TAB 80 MG 80 MG: 80 TABLET ORAL at 10:10

## 2017-10-31 RX ADMIN — ATORVASTATIN CALCIUM 20 MG: 20 TABLET, FILM COATED ORAL at 06:10

## 2017-10-31 RX ADMIN — ALPRAZOLAM 0.5 MG: 0.25 TABLET ORAL at 06:10

## 2017-10-31 RX ADMIN — ESCITALOPRAM 20 MG: 20 TABLET, FILM COATED ORAL at 06:10

## 2017-10-31 RX ADMIN — DIPHENHYDRAMINE HYDROCHLORIDE 25 MG: 25 CAPSULE ORAL at 12:10

## 2017-10-31 RX ADMIN — FLUTICASONE FUROATE AND VILANTEROL TRIFENATATE 1 PUFF: 100; 25 POWDER RESPIRATORY (INHALATION) at 08:10

## 2017-10-31 RX ADMIN — IBUPROFEN 600 MG: 600 TABLET, FILM COATED ORAL at 10:10

## 2017-10-31 RX ADMIN — ACETAMINOPHEN 1000 MG: 10 INJECTION, SOLUTION INTRAVENOUS at 05:10

## 2017-10-31 RX ADMIN — OXYCODONE AND ACETAMINOPHEN 1 TABLET: 10; 325 TABLET ORAL at 09:10

## 2017-10-31 RX ADMIN — IBUPROFEN 800 MG: 800 INJECTION INTRAVENOUS at 06:10

## 2017-10-31 RX ADMIN — OXYCODONE AND ACETAMINOPHEN 1 TABLET: 10; 325 TABLET ORAL at 01:10

## 2017-10-31 RX ADMIN — HYDROMORPHONE HYDROCHLORIDE 0.5 MG: 1 INJECTION, SOLUTION INTRAMUSCULAR; INTRAVENOUS; SUBCUTANEOUS at 05:10

## 2017-10-31 RX ADMIN — ASPIRIN 81 MG: 81 TABLET, COATED ORAL at 06:10

## 2017-10-31 RX ADMIN — IBUPROFEN 600 MG: 600 TABLET, FILM COATED ORAL at 11:10

## 2017-10-31 NOTE — ASSESSMENT & PLAN NOTE
POD#1  - Continue routine post-op advances  - Pain: s/p 24h IV acetaminophen, transition to po pain meds this morning, pain well controlled  - Voiding and ambulating withotu issue  - Encourage ambulation  - Encourage IS  - Pre Op H/H 12/39, unchanged  - Continue IVF until tolerating more po  - Tolerating regular diet without N/V  - Antiemetics prn nausea/vomiting (not requiring today)  - Simethicone prn for gas pain

## 2017-10-31 NOTE — NURSING
"Witnessed anxiety attack when pt ws ambulating from bed to stretcher to be transported to [facility]. Pt began hyperventilating, but did not report any other symptoms. Pt only stated that "it feels like I can't breathe." Pt denied any lightheadedness or chest tightness. 2L NC applied for less than 5 minutes while pt was recovering before proceeding to stretcher.  "

## 2017-10-31 NOTE — PLAN OF CARE
Problem: Patient Care Overview  Goal: Plan of Care Review  Outcome: Ongoing (interventions implemented as appropriate)  * AAO x 4  * Abdomen slightly distended, soft, and tender  * No bowel sound noted. Patient denies flatus & belching.   * Diet: clears patient tolerating well  * Midline incision with telfa dressing dry and intact.  A small amount of dried blood on dressing circled drainage no additional drainage noted.   * No edema noted   * Bed at lowest position and locked, call light within reach, non-slip socks on, daughter at bedside, and side rails up x 2.  Encouraged patient to call for assistance when needed patient and daughter stated understanding.  * Left hand G PIV (10/30/17) painful to flush. PIV removed catheter intact no signs or symptoms of infection noted. Site covered with gauze and secured with coban.  * Left AC 18 G PIV (10/30/17) patent and saline locked, dressing clean dry and intact no signs or symptoms of infection noted  * Right hand 20 G PIV (10/30/17) patent with IVF infusing, dressing clean dry and intact no signs or symptoms of infection noted  * D 5 1/2 NS w/20 hawa KCL @ 100 cc/hr verified correct by this RN  * Oxygen saturation 99-98 % on room air.  Respirations even and unlabored no signs or symptoms of distress noted.   * Patient has complaints of pain to the    * Standard precautions maintained  * Patient able to turn self no assistance needed.  * Romo catheter (10/30/17) patent and intact secured to leg with securment device. Clear yellow urine noted see flow sheet for intake and output totals.

## 2017-10-31 NOTE — NURSING
Notified Dr Lisa of blood pressure readings post hydralazine administration. With the most current reading of 181/80 per MD recheck blood pressure in 30 minutes and if SBP >180 administer Hydralazine 5 mg IV once. Will monitor.

## 2017-10-31 NOTE — ASSESSMENT & PLAN NOTE
- pain improving, however has precluded easy ambulation in the past  - ortho consult pending BL knee XRs
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
## 2017-10-31 NOTE — ASSESSMENT & PLAN NOTE
BP: (128-191)/(78-99) 128/84  BP elevated overnight, s/p hydralazine 5mg IV x 1  Restart home meds: losartan and HCTZ

## 2017-10-31 NOTE — SUBJECTIVE & OBJECTIVE
Interval History: Doing well this morning, up in chair. Recently ambulated to restroom. Pain well controlled on IV tylenol/ibuprofen, s/p dilaudid x 3 overnight. Reports one episode of nausea which resolved with meds, no emesis. Tolerating clears. States she had some anxiety this morning which she thinks was related to pain, has since resolved. Romo was just removed, has not yet urinated. Denies flatus. Denies fever, chills, CP, SOB. BP elevated to 180 systolic overnight, s/p hydralazine IV x 1.    Scheduled Meds:   aspirin  81 mg Oral QAM    atorvastatin  20 mg Oral QAM    escitalopram oxalate  20 mg Oral QAM    fluticasone-vilanterol  1 puff Inhalation QAM    hydrALAZINE  5 mg Intravenous Once    ibuprofen  800 mg Intravenous Q6H    mupirocin  1 g Nasal BID    pantoprazole  40 mg Oral QAM    senna-docusate 8.6-50 mg  1 tablet Oral BID     Continuous Infusions:   dextrose 5 % and 0.45 % NaCl with KCl 20 mEq 100 mL/hr at 10/30/17 2304     PRN Meds:albuterol, diphenhydrAMINE, HYDROmorphone, ondansetron, promethazine (PHENERGAN) IVPB, simethicone, sumatriptan    Review of patient's allergies indicates:   Allergen Reactions    Norvasc [amlodipine] Swelling    Buspar [buspirone] Other (See Comments)     Suicidal thoughts    Lisinopril Other (See Comments)     Unsure of reaction    Naproxen Other (See Comments)     Weakness, dizziness, chest pain    Pravastatin sodium      Chest pain  And headache       Objective:     Vital Signs (Most Recent):  Temp: 97.6 °F (36.4 °C) (10/31/17 0433)  Pulse: 73 (10/31/17 0433)  Resp: 18 (10/31/17 0433)  BP: 128/84 (10/31/17 0433)  SpO2: (!) 94 % (10/31/17 0433) Vital Signs (24h Range):  Temp:  [97.5 °F (36.4 °C)-98.2 °F (36.8 °C)] 97.6 °F (36.4 °C)  Pulse:  [50-74] 73  Resp:  [16-23] 18  SpO2:  [94 %-100 %] 94 %  BP: (128-191)/(78-99) 128/84     Weight: 87.1 kg (192 lb 1.6 oz)  Body mass index is 31.01 kg/m².    Intake/Output - Last 3 Shifts       10/29 0700 - 10/30 0659  10/30 0700 - 10/31 0659    P.O.  75    I.V. (mL/kg)  2583.3 (29.7)    IV Piggyback  450    Total Intake(mL/kg)  3108.3 (35.7)    Urine (mL/kg/hr)  4825    Blood  200    Total Output   5025    Net   -1916.7                   Physical Exam:   Constitutional: She is oriented to person, place, and time. She appears well-developed and well-nourished.    HENT:   Head: Normocephalic and atraumatic.     Neck: Normal range of motion.    Cardiovascular: Normal rate, regular rhythm and normal heart sounds.     Pulmonary/Chest: Effort normal and breath sounds normal. No respiratory distress. She has no wheezes. She has no rales.        Abdominal: Soft. She exhibits abdominal incision (bandage in place, c/d/i, very small amount of drainage on bandage (marked)). She exhibits no distension. There is no tenderness. There is no rebound and no guarding.   Hypoactive BS             Musculoskeletal: Normal range of motion and moves all extremeties.   TEDs in place       Neurological: She is alert and oriented to person, place, and time.    Skin: Skin is warm and dry.    Psychiatric: She has a normal mood and affect.       Lines/Drains/Airways     Peripheral Intravenous Line                 Peripheral IV - Single Lumen 10/30/17 Right Hand 1 day         Peripheral IV - Single Lumen 10/30/17 0815 Left Antecubital less than 1 day                Laboratory:  Lab Results   Component Value Date    WBC 8.23 10/20/2017    HGB 12.5 10/20/2017    HCT 39.8 10/20/2017    MCV 86 10/20/2017     10/20/2017
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
## 2017-10-31 NOTE — OP NOTE
DATE OF PROCEDURE:  10/30/2017    PREOPERATIVE DIAGNOSIS:  Pelvic mass.    POSTOPERATIVE DIAGNOSIS:  Hydro-cervix.    PROCEDURES:  1.  Total abdominal hysterectomy (prior bilateral salpingo-oophorectomy).  2.  Repair of small bowel.  3.  Lysis of small bowel adhesions (20 minutes).    SURGEON:  Junior Buitrago M.D.    FIRST ASSISTANT:  Erica Lisa M.D. (RES).    OPERATIVE HISTORY:  This is a 58-year-old patient who was seen in the emergency   room in [place], [place] with pelvic pain.  She was found to have an 8 x 8 cm   rounded mass in the pelvis within the vagina that appeared to be contiguous with   the cervix.  The patient kept giving a history as did the family of a partial   hysterectomy, although review of outside records indicated that she still had a   uterus.    Clinically, my impression was that of a hematometra.  The patient had been   having some vaginal bleeding off and on.    OPERATIVE FINDINGS:  The cervix is stenotic.  The uterus is small.  Both adnexa   are absent.  The cervix is distended and once removed, there was old bloody   fluid present.  There was no gross endocervical or endometrial mucosal   abnormalities.    As we were entering the abdomen with the Bovie unit, I was concerned for a Bovie   injury.  This area was inspected and there was no evidence for that at that   time or at the end of the case.  I did place two 3-0 silk sutures over this   area, but again there was no obvious injury.    OPERATIVE PROCEDURE:  The patient was brought to the operating room and after   induction of general anesthesia, was placed in [facility].  The vulva and   vagina were prepped with Betadine scrub and solution.  A Romo catheter was   placed.  The abdomen was prepped with ChloraPrep and the patient was sterilely   draped.    A midline abdominal incision was made down to the fascia.  Fascia was then   incised with the Bovie unit.  As we were dissecting down to the preperitoneal   fat, we entered  into the peritoneal cavity.  There was a loop of small bowel   directly below this that I initially had concern for an injury, but this loop of   bowel was brought up and thoroughly inspected and there was no evidence for a   thermal injury.  Although in the area of concern, I did place two 3-0 silk   sutures.    At this point, the peritoneum was opened for the remainder length of the   abdominal incision, which was also extended above the umbilicus to give us   better exposure to the pelvis.    There were loops of small bowel in the right lower quadrant that were adhesed   and these adhesions were taken down.  At the end of the case when I reinspected   the bowel, there were also other loops of small bowel that were adhesed, some of   which were angled and so I took these adhesions down as well.  This took   approximately 20 minutes.    The bowel was packed out of the pelvis.  The uterus was grasped with Regina   clamps on either side.    The round ligaments bilaterally were then cauterized with the Bovie unit.  The   ureters were identified retroperitoneally.  The anterior cul-de-sac peritoneum   was taken down and the bladder was dissected downward off of the cervix.    The uterine vessels were then clamped, cut and suture ligated bilaterally with   #0 Vicryl suture.    We continued in a clamp, cut, ligature technique as we continued to downsize the   cervix until we entered into the vagina, first on the left and then the right   hand side.  The angles of the vagina were secured with interrupted #0 Vicryl   suture.  The intervening vagina was then closed with interrupted #0 Vicryl   suture after severing the cervix from the vagina.    I inspected the specimen on the back operating room table and there was no   obvious evidence for any intrauterine or cervical pathology.    The pelvis was now irrigated.  There was no evidence of bleeding.    Four half sheets of Seprafilm were placed in the pelvis.    The small  bowel was now reinspected and there was no evidence for any injuries.    There were several loops of small bowel that were adhesed and angled sharply on   themselves and these were taken down as described above.    The preperitoneal and shanita-fascial tissues were next infiltrated with 266 mg of   Exparel.  This was 20 mL diluted in 100 mL of injectable normal saline; 60 mL   was injected on each side.  Remaining Seprafilm was then placed between the   omentum and the abdominal peritoneum.  The fascia and peritoneum were closed in   a bulk closure with a #1 looped PDS suture beginning superiorly and inferiorly   and tying approximately mid incision.  Subcutaneous tissues were irrigated and   the skin was then closed with a running 4-0 Monocryl suture in a subcuticular   closure after approximating the subcutaneous fat with a running 2-0 plain   catgut.    The patient's legs were then brought back down to straight position.  She was   awakened and taken to the recovery room in stable condition.  Lap, needle,   sponge and instrument count was correct.      KALEB  dd: 10/31/2017 13:06:37 (CDT)  td: 10/31/2017 14:33:25 (CDT)  Doc ID   #[id number]  Job ID #[id number]    CC:

## 2017-10-31 NOTE — ASSESSMENT & PLAN NOTE
- s/p psychiatry consult, appreciate recs  - continue home lexapro 20mg  - recommended Buspar 7.5 TID, however patient reports having SI on Buspar in past, will hold off  - will initiate xanax 0.5mg TID prn for panic attacks

## 2017-10-31 NOTE — PLAN OF CARE
Problem: Patient Care Overview  Goal: Plan of Care Review  Outcome: Ongoing (interventions implemented as appropriate)  Pt AAOx4.   Pt's /60 this AM; losartan and HCTZ held per Dr. Lisa. Pt's BP has remained low this shift. Pt has remained free from injury this shift. Pt has c/o of unrelenting 10/10 pain this shift; PRN medication administered as ordered, and MD notified of pain level. Pt c/o of itching; diphenhydramine administered as ordered. Pt c/o of panic attack; I did not witness the attack, but did witness slight hyperventilation.  MD notified of anxiety attack and that pt reports 5 anxiety attacks in the past 12 hours.  Pt's daughter at bedside and involved in care. Bed in low locked position. Call light and personal belongings within reach. Side rails up x2. Nonskid socks in place. All questions and comments addressed at this time. Will continue to monitor.  Fall, Pressure ulcer, infection precautions continued.

## 2017-10-31 NOTE — NURSING
Notified Dr Lisa of ca level 8.2 and blood pressure 191/99 with a pain 10/10 and nausea. Administered IV zofran and IV dilaudid rechecked blood pressure 177/90 pain down to a 5/10 and nausea resolved MD ordered IV hydralazine 5 mg will monitor.
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
## 2017-10-31 NOTE — SUBJECTIVE & OBJECTIVE
Interval History:  Doing well this afternoon. Had stew over rice for lunch, no N/V. Has voided several times since having lucio removed, ambulating without issue. Denies flatus. Pain moderately controlled on current PO regimen, improves w/ Dilaudid for BTP. Denies fever, chills, CP. Does report SOB w/ panic attacks that have occurred several times today.     Scheduled Meds:   aspirin  81 mg Oral QAM    atorvastatin  20 mg Oral QAM    escitalopram oxalate  20 mg Oral QAM    fluticasone-vilanterol  1 puff Inhalation QAM    hydrALAZINE  5 mg Intravenous Once    hydroCHLOROthiazide  12.5 mg Oral Daily    ibuprofen  600 mg Oral Q6H    losartan  100 mg Oral QAM    mupirocin  1 g Nasal BID    pantoprazole  40 mg Oral QAM    senna-docusate 8.6-50 mg  1 tablet Oral BID     Continuous Infusions:   dextrose 5 % and 0.45 % NaCl with KCl 20 mEq 100 mL/hr at 10/30/17 2304     PRN Meds:albuterol, diphenhydrAMINE, HYDROmorphone, ondansetron, oxyCODONE-acetaminophen, oxyCODONE-acetaminophen, promethazine (PHENERGAN) IVPB, simethicone, sumatriptan    Review of patient's allergies indicates:   Allergen Reactions    Norvasc [amlodipine] Swelling    Buspar [buspirone] Other (See Comments)     Suicidal thoughts    Lisinopril Other (See Comments)     Unsure of reaction    Naproxen Other (See Comments)     Weakness, dizziness, chest pain    Pravastatin sodium      Chest pain  And headache       Objective:     Vital Signs (Most Recent):  Temp: 98.3 °F (36.8 °C) (10/31/17 1126)  Pulse: 83 (10/31/17 1126)  Resp: 16 (10/31/17 1126)  BP: (!) 91/54 (10/31/17 1126)  SpO2: (!) 93 % (10/31/17 1126) Vital Signs (24h Range):  Temp:  [97.6 °F (36.4 °C)-98.3 °F (36.8 °C)] 98.3 °F (36.8 °C)  Pulse:  [57-85] 83  Resp:  [16-20] 16  SpO2:  [93 %-99 %] 93 %  BP: ()/(54-99) 91/54     Weight: 87.1 kg (192 lb 1.6 oz)  Body mass index is 31.01 kg/m².    Intake/Output - Last 3 Shifts       10/29 0700 - 10/30 0659 10/30 0700 - 10/31 0659  10/31 0700 - 11/01 0659    P.O.  75 275    I.V. (mL/kg)  2583.3 (29.7) 2125 (24.4)    IV Piggyback  450     Total Intake(mL/kg)  3108.3 (35.7) 2400 (27.6)    Urine (mL/kg/hr)  4825 300 (0.4)    Blood  200     Total Output   5025 300    Net   -1916.7 +2100                    Physical Exam:   Constitutional: She is oriented to person, place, and time. She appears well-developed and well-nourished.    HENT:   Head: Normocephalic and atraumatic.     Neck: Normal range of motion.    Cardiovascular: Normal rate, regular rhythm and normal heart sounds.     Pulmonary/Chest: Effort normal and breath sounds normal. No respiratory distress. She has no wheezes. She has no rales.        Abdominal: Soft. Bowel sounds are normal. She exhibits abdominal incision (bandage removed, steri strips in place, no erythema/drainage). She exhibits no distension. There is tenderness (appropriate). There is no rebound and no guarding.             Musculoskeletal: Normal range of motion and moves all extremeties.   TEDs in place       Neurological: She is alert and oriented to person, place, and time.    Skin: Skin is warm and dry.    Psychiatric: She has a normal mood and affect.       Lines/Drains/Airways     Peripheral Intravenous Line                 Peripheral IV - Single Lumen 10/30/17 0815 Left Antecubital 1 day         Peripheral IV - Single Lumen 10/30/17 Right Hand 1 day                Laboratory:  Lab Results   Component Value Date    WBC 7.86 10/31/2017    HGB 12.2 10/31/2017    HCT 38.6 10/31/2017    MCV 82 10/31/2017     10/31/2017

## 2017-10-31 NOTE — NURSING
Lucio catheter removed per MD order. 10 cc withdrawn from lucio. Lucio catheter removed is intact. Patient tolerated well.

## 2017-10-31 NOTE — PROGRESS NOTES
[facility]  Gynecologic Oncology  Progress Note      Patient Name: Lindy Sparks  MRN: [id number]  Admission Date: 10/30/2017  Hospital Length of Stay: 1 days  Attending Provider: Junior Buitrago MD  Primary Care Provider: Lindy Amor MD  Principal Problem: S/P exploratory laparotomy    Follow-up For: Procedure(s) (LRB):  HYSTERECTOMY-ABDOMINAL-TOTAL (ANTONIO) (N/A)  LYSIS-ADHESION of small bowel adhensions 20 minutes.  LYMPHADENECTOMY (Right)  Post-Operative Day: 1 Day Post-Op  Subjective:      History of Present Illness:  67yr old para 3 ( x 3) presents for scheduled surgery secondary to a pelvic mass.  Was visiting in [place] in 2017 and had pelvic pain radiating to back. Seen at [facility] ED and CT scan of abdomen and pelvis was done. This showed a 8.1 x 7.6 cm rounded mass noted within the vagina and appeared contiguous with the cervix. Patient has copy of report but no images.      10/3/2017 saw Dr. Maximilian Perry with complaint of pelvic pain.   US done that showed a midline pelvic mass 11 x 6 cm.      Referred to GYN ONC for further management.       Also seen at [facility] in 2017 for WWE. Normal exam. Pessary was changed.   Seen in 2017 at [facility] for vaginitis. US showed , the uterus appears within normal in size measuring 5.9 x 3.5 x 2.2 cm with hypoechoic area noted over the posterior fundus adjacent to the endometrial canal measuring 5 x 4.4 mm, being avascular and possibly a small fibroid. In the cervical region, a 2 x 1.4 x 0.7 cm prevascular lesion is noted consistent with probable nabothian cyst. Endometrial canal is minimally thickened measuring 3 mm in AP dimension and complex, possibly due to cyclical change.  Neither ovary could be visualized. No adnexal mass nor pelvic fluid could be detected.  Impressions:  Small hypoechoic area noted over the posterior uterine fundus adjacent to the endometrial canal, possibly a small fibroid. Probable  nabothian type cyst in the cervical region.  No definite further abnormality could be detected, although neither ovary could be visualized.     Biopsy of lesion on posterior vagina was normal.    Hospital Course:  10/30/2017 HD#1. Underwent ANTONIO as scheduled, uncomplicated. See op note for details.   10/31/2017 - POD#1, no acute events. Meeting appropriate post-op milestones.    Interval History:  Doing well this afternoon. Had stew over rice for lunch, no N/V. Has voided several times since having lucio removed, ambulating without issue. Denies flatus. Pain moderately controlled on current PO regimen, improves w/ Dilaudid for BTP. Denies fever, chills, CP. Does report SOB w/ panic attacks that have occurred several times today.     Scheduled Meds:   aspirin  81 mg Oral QAM    atorvastatin  20 mg Oral QAM    escitalopram oxalate  20 mg Oral QAM    fluticasone-vilanterol  1 puff Inhalation QAM    hydrALAZINE  5 mg Intravenous Once    hydroCHLOROthiazide  12.5 mg Oral Daily    ibuprofen  600 mg Oral Q6H    losartan  100 mg Oral QAM    mupirocin  1 g Nasal BID    pantoprazole  40 mg Oral QAM    senna-docusate 8.6-50 mg  1 tablet Oral BID     Continuous Infusions:   dextrose 5 % and 0.45 % NaCl with KCl 20 mEq 100 mL/hr at 10/30/17 2304     PRN Meds:albuterol, diphenhydrAMINE, HYDROmorphone, ondansetron, oxyCODONE-acetaminophen, oxyCODONE-acetaminophen, promethazine (PHENERGAN) IVPB, simethicone, sumatriptan    Review of patient's allergies indicates:   Allergen Reactions    Norvasc [amlodipine] Swelling    Buspar [buspirone] Other (See Comments)     Suicidal thoughts    Lisinopril Other (See Comments)     Unsure of reaction    Naproxen Other (See Comments)     Weakness, dizziness, chest pain    Pravastatin sodium      Chest pain  And headache       Objective:     Vital Signs (Most Recent):  Temp: 98.3 °F (36.8 °C) (10/31/17 1126)  Pulse: 83 (10/31/17 1126)  Resp: 16 (10/31/17 1126)  BP: (!) 91/54  (10/31/17 1126)  SpO2: (!) 93 % (10/31/17 1126) Vital Signs (24h Range):  Temp:  [97.6 °F (36.4 °C)-98.3 °F (36.8 °C)] 98.3 °F (36.8 °C)  Pulse:  [57-85] 83  Resp:  [16-20] 16  SpO2:  [93 %-99 %] 93 %  BP: ()/(54-99) 91/54     Weight: 87.1 kg (192 lb 1.6 oz)  Body mass index is 31.01 kg/m².    Intake/Output - Last 3 Shifts       10/29 0700 - 10/30 0659 10/30 0700 - 10/31 0659 10/31 0700 - 11/01 0659    P.O.  75 275    I.V. (mL/kg)  2583.3 (29.7) 2125 (24.4)    IV Piggyback  450     Total Intake(mL/kg)  3108.3 (35.7) 2400 (27.6)    Urine (mL/kg/hr)  4825 300 (0.4)    Blood  200     Total Output   5025 300    Net   -1916.7 +2100                    Physical Exam:   Constitutional: She is oriented to person, place, and time. She appears well-developed and well-nourished.    HENT:   Head: Normocephalic and atraumatic.     Neck: Normal range of motion.    Cardiovascular: Normal rate, regular rhythm and normal heart sounds.     Pulmonary/Chest: Effort normal and breath sounds normal. No respiratory distress. She has no wheezes. She has no rales.        Abdominal: Soft. Bowel sounds are normal. She exhibits abdominal incision (bandage removed, steri strips in place, no erythema/drainage). She exhibits no distension. There is tenderness (appropriate). There is no rebound and no guarding.             Musculoskeletal: Normal range of motion and moves all extremeties.   TEDs in place       Neurological: She is alert and oriented to person, place, and time.    Skin: Skin is warm and dry.    Psychiatric: She has a normal mood and affect.       Lines/Drains/Airways     Peripheral Intravenous Line                 Peripheral IV - Single Lumen 10/30/17 0815 Left Antecubital 1 day         Peripheral IV - Single Lumen 10/30/17 Right Hand 1 day                Laboratory:  Lab Results   Component Value Date    WBC 7.86 10/31/2017    HGB 12.2 10/31/2017    HCT 38.6 10/31/2017    MCV 82 10/31/2017     10/31/2017          Assessment/Plan:     * S/P exploratory laparotomy/Hysterectomy/MARTINA (10/30/2017)    POD#1  - Continue routine post-op advances  - Pain: s/p 24h IV acetaminophen, transition to po pain meds this morning, pain well controlled  - Voiding and ambulating withotu issue  - Encourage ambulation  - Encourage IS  - Pre Op H/H 12/39, unchanged  - Continue IVF until tolerating more po  - Tolerating regular diet without N/V  - Antiemetics prn nausea/vomiting (not requiring today)  - Simethicone prn for gas pain          Pelvic mass in female    - suspect benign pathology, follow up final path        Left knee pain    - pain improving, however has precluded easy ambulation in the past  - ortho consult pending BL knee XRs        Pure hypercholesterolemia    - continue home statin        Essential hypertension    - BP: ()/(54-99) 100/62  - no prn meds since overnight hydralazine  - contt home meds: losartan and HCTZ          GERD (gastroesophageal reflux disease)    - protonix daily        Depression    - s/p psychiatry consult, appreciate recs  - continue home lexapro 20mg  - recommended Buspar 7.5 TID, however patient reports having SI on Buspar in past, will hold off  - will initiate xanax 0.5mg TID prn for panic attacks          VTE Risk Mitigation         Ordered     Place sequential compression device  Until discontinued      10/30/17 1142     Place MARY hose  Until discontinued      10/30/17 1142     Medium Risk of VTE  Once      10/30/17 1142          Was lucio catheter removed? Yes    Latosha Cooper MD  Gynecologic Oncology  [facility]

## 2017-10-31 NOTE — ASSESSMENT & PLAN NOTE
POD#1  - Continue routine post-op advances  - Pain: Continue IV tylenol and ibuprofen x 24h, transition to po pain meds later today, pain well controlled  - UOP: 250cc/hr over 10 hours, naveed removed this AM  - Encourage ambulation  - Encourage IS  - Pre Op H/H 12/39, follow up labs  - Continue IVF until tolerating more po  - Clear liquid diet, will advance as tolerated today  - Antiemetics prn nausea/vomiting.  - Simethicone prn for gas pain  - BMP/CBC pending

## 2017-10-31 NOTE — SUBJECTIVE & OBJECTIVE
Patient History           Medical as of 10/31/2017     Past Medical History     Diagnosis Date Comments Source    Acid reflux -- -- Provider    Anxiety -- -- Provider    Arthritis -- -- Provider    Coronary artery disease -- -- Provider    Depression -- -- Provider    Ectopic pregnancy -- -- Provider    History of psychiatric hospitalization -- -- Provider    Hx of psychiatric care -- -- Provider    Hypertension -- -- Provider    Migraine -- -- Provider    Pelvic mass in female 10/8/2017 -- Provider    Psychiatric problem -- -- Provider    Suicide attempt -- -- Provider    Therapy -- -- Provider          Pertinent Negatives     Diagnosis Date Noted Comments Source    Deep vein thrombosis 10/19/2017 -- Provider    Difficult intubation 2015 -- Provider    Hypotension, iatrogenic 2015 -- Provider    Malignant hyperthermia 2015 -- Provider    Alicia 10/31/2017 -- Provider    PONV (postoperative nausea and vomiting) 2015 -- Provider    Pulmonary embolism 10/19/2017 -- Provider    Seizures 10/19/2017 -- Provider    Stroke 10/19/2017 -- Provider                  Surgical as of 10/31/2017     Past Surgical History     Procedure Laterality Date Comments Source    TONSILLECTOMY -- -- -- Provider    CHOLECYSTECTOMY -- -- -- Provider    COLONOSCOPY -- -- -- Provider     SECTION -- -- -- Provider    ECTOPIC PREGNANCY SURGERY -- -- -- Provider                  Family as of 10/31/2017     Problem Relation Name Age of Onset Comments Source    Hyperlipidemia Mother -- -- -- Provider    Stroke Mother -- -- -- Provider    Heart disease Father -- -- ? age of onset Provider    Stroke Father -- -- -- Provider    Stroke Brother -- -- -- Provider    No Known Problems Daughter -- -- -- Provider    No Known Problems Son -- -- -- Provider    No Known Problems Daughter -- -- -- Provider            Tobacco Use as of 10/31/2017     Smoking Status Smoking Start Date Smoking Quit Date Packs/day Years Used    Never  Smoker -- -- -- --    Types Comments Smokeless Tobacco Status Smokeless Tobacco Quit Date Source    -- -- Never Used -- Provider            Alcohol Use as of 10/31/2017     Alcohol Use Drinks/Week Alcohol/Week Comments Source    Yes 1 Standard drinks or equivalent 0.6 oz occ Provider            Drug Use as of 10/31/2017     Drug Use Types Frequency Comments Source    No -- -- -- Provider            Sexual Activity as of 10/31/2017     Sexually Active Birth Control Partners Comments Source    Yes Surgical Male -- Provider            Activities of Daily Living as of 10/31/2017    **None**           Social Documentation as of 10/31/2017    **None**           Occupational as of 10/31/2017    **None**           Socioeconomic as of 10/31/2017     Marital Status Spouse Name Number of Children Years Education Preferred Language Ethnicity Race Source    Single -- -- -- English  () or Al[place]an  Indian or  --         Pertinent History Q A Comments    as of 10/31/2017 Lives with alone     Place in Birth Order      Lives in home     Number of Siblings  13    Raised by biological parents     Legal Involvement none     Childhood Trauma early trauma     Criminal History of none     Financial Status disabled     Highest Level of Education      Does patient have access to a firearm? No      Service      Primary Leisure Activity time with family     Spirituality       Past Medical History:   Diagnosis Date    Acid reflux     Anxiety     Arthritis     Coronary artery disease     Depression     Ectopic pregnancy     History of psychiatric hospitalization     Hx of psychiatric care     Hypertension     Migraine     Pelvic mass in female 10/8/2017    Psychiatric problem     Suicide attempt     Therapy      Past Surgical History:   Procedure Laterality Date     SECTION      CHOLECYSTECTOMY      COLONOSCOPY      ECTOPIC PREGNANCY SURGERY       TONSILLECTOMY       Family History     Problem Relation (Age of Onset)    Heart disease Father    Hyperlipidemia Mother    No Known Problems Daughter, Son, Daughter    Stroke Mother, Father, Brother        Social History Main Topics    Smoking status: Never Smoker    Smokeless tobacco: Never Used    Alcohol use 0.6 oz/week     1 Standard drinks or equivalent per week      Comment: occ    Drug use: No    Sexual activity: Yes     Partners: Male     Birth control/ protection: Surgical     Review of patient's allergies indicates:   Allergen Reactions    Norvasc [amlodipine] Swelling    Buspar [buspirone] Other (See Comments)     Suicidal thoughts    Lisinopril Other (See Comments)     Unsure of reaction    Naproxen Other (See Comments)     Weakness, dizziness, chest pain    Pravastatin sodium      Chest pain  And headache       No current facility-administered medications on file prior to encounter.      Current Outpatient Prescriptions on File Prior to Encounter   Medication Sig    albuterol (PROVENTIL) 2.5 mg /3 mL (0.083 %) nebulizer solution Take 3 mLs (2.5 mg total) by nebulization every 6 (six) hours as needed for Wheezing.    albuterol (VENTOLIN HFA) 90 mcg/actuation inhaler INHALE 2 PUFFS INTO THE LUNGS EVERY 6 HOURS AS NEEDED WHEEZING    atorvastatin (LIPITOR) 20 MG tablet Take 1 tablet (20 mg total) by mouth once daily. (Patient taking differently: Take 20 mg by mouth every morning. )    conjugated estrogens (PREMARIN) vaginal cream Place a pea-sized amount in vagina every other night. Patient unsure of dose    DOCUSATE CALCIUM (STOOL SOFTENER ORAL) Take by mouth as needed. Patient unsure of dose    escitalopram oxalate (LEXAPRO) 20 MG tablet Take 1 tablet (20 mg total) by mouth once daily. (Patient taking differently: Take 20 mg by mouth every morning. )    fluticasone-vilanterol (BREO) 100-25 mcg/dose diskus inhaler Inhale 1 puff into the lungs once daily. Controller (Patient taking  "differently: Inhale 1 puff into the lungs every morning. Controller)    gabapentin (NEURONTIN) 400 MG capsule Take 1 capsule (400 mg total) by mouth every evening. (Patient taking differently: Take 400 mg by mouth every morning. )    losartan (COZAAR) 100 MG tablet Take 1 tablet (100 mg total) by mouth once daily. (Patient taking differently: Take 100 mg by mouth every morning. )    methocarbamol (ROBAXIN) 500 MG Tab every other day.     pantoprazole (PROTONIX) 40 MG tablet Take 1 tablet (40 mg total) by mouth once daily. For reflux (Patient taking differently: Take 40 mg by mouth every morning. For reflux)    aspirin (ECOTRIN) 81 MG EC tablet Take 1 tablet (81 mg total) by mouth once daily. (Patient taking differently: Take 81 mg by mouth every morning. )    ergocalciferol (ERGOCALCIFEROL) 50,000 unit Cap Take 1 capsule (50,000 Units total) by mouth every 7 days. (Patient taking differently: Take 50,000 Units by mouth every 7 days. Takes on Fri)     Psychotherapeutics     Start     Stop Route Frequency Ordered    10/30/17 1145  escitalopram oxalate tablet 20 mg      -- Oral Every morning 10/30/17 1143        Review of Systems    Objective:     Vital Signs (Most Recent):  Temp: 98.3 °F (36.8 °C) (10/31/17 1126)  Pulse: 83 (10/31/17 1126)  Resp: 16 (10/31/17 1126)  BP: (!) 91/54 (10/31/17 1126)  SpO2: (!) 93 % (10/31/17 1126) Vital Signs (24h Range):  Temp:  [97.6 °F (36.4 °C)-98.3 °F (36.8 °C)] 98.3 °F (36.8 °C)  Pulse:  [57-85] 83  Resp:  [16-20] 16  SpO2:  [93 %-99 %] 93 %  BP: ()/(54-99) 91/54     Height: 5' 6" (167.6 cm)  Weight: 87.1 kg (192 lb 1.6 oz)  Body mass index is 31.01 kg/m².      Intake/Output Summary (Last 24 hours) at 10/31/17 1603  Last data filed at 10/31/17 1439   Gross per 24 hour   Intake             3000 ml   Output             3275 ml   Net             -275 ml       Physical Exam   Mental Status Exam:  Appearance: unremarkable, age appropriate  Behavior/Cooperation:  cooperative, " restless, breathing fast and heavy at times  Speech: appropriate rate, volume and tone   Language: uses words appropriately; NO aphasia or dysarthria  Mood: anxious  Affect:  congruent with mood and appropriate to situation/content   Thought Process: normal and logical  Thought Content: normal, no suicidality, no homicidality, delusions, or paranoia  Level of Consciousness: Alert and Oriented x3  Memory:  Intact to conversation  Attention/concentration: appropriate for age/education.   Fund of Knowledge: appears adequate  Insight: Intact  Judgment:  Intact     Significant Labs:   Last 24 Hours:   Recent Lab Results       10/31/17  0516 10/30/17  1721      Immature Granulocytes 0.4      Immature Grans (Abs) 0.03      Anion Gap 8 9     Baso # 0.02      Basophil% 0.3      BUN, Bld 7(L) 10     Calcium 8.7 8.2(L)     Chloride 103 104     CO2 26 24     Creatinine 0.7 0.7     Differential Method Automated      eGFR if  >60.0 >60.0     eGFR if non  >60.0  Comment:  Calculation used to obtain the estimated glomerular filtration  rate (eGFR) is the CKD-EPI equation. Since race is unknown   in our information system, the eGFR values for   -American and Non--American patients are given   for each creatinine result.   >60.0  Comment:  Calculation used to obtain the estimated glomerular filtration  rate (eGFR) is the CKD-EPI equation. Since race is unknown   in our information system, the eGFR values for   -American and Non--American patients are given   for each creatinine result.       Eos # 0.1      Eosinophil% 0.8      Glucose 113(H) 123(H)     Gran # 5.0      Gran% 62.8      Hematocrit 38.6      Hemoglobin 12.2      Lymph # 2.2      Lymph% 27.4      MCH 26.0(L)      MCHC 31.6(L)      MCV 82      Mono # 0.7      Mono% 8.3      MPV 10.2      nRBC 0      Platelets 207      Potassium 3.4(L) 3.7     RBC 4.69      RDW 14.6(H)      Sodium 137 137     WBC 7.86             Significant Imaging: I have reviewed all pertinent imaging results/findings within the past 24 hours.

## 2017-10-31 NOTE — PROGRESS NOTES
[facility]  Gynecologic Oncology  Progress Note      Patient Name: Lindy Sparks  MRN: [id number]  Admission Date: 10/30/2017  Hospital Length of Stay: 1 days  Attending Provider: Junior Buitrago MD  Primary Care Provider: Lindy Amor MD  Principal Problem: S/P exploratory laparotomy    Follow-up For: Procedure(s) (LRB):  HYSTERECTOMY-ABDOMINAL-TOTAL (ANTONIO) (N/A)  LYSIS-ADHESION of small bowel adhensions 20 minutes.  LYMPHADENECTOMY (Right)  Post-Operative Day: 1 Day Post-Op  Subjective:      History of Present Illness:  67yr old para 3 ( x 3) presents for scheduled surgery secondary to a pelvic mass.  Was visiting in [place] in 2017 and had pelvic pain radiating to back. Seen at [facility] ED and CT scan of abdomen and pelvis was done. This showed a 8.1 x 7.6 cm rounded mass noted within the vagina and appeared contiguous with the cervix. Patient has copy of report but no images.      10/3/2017 saw Dr. Maximilian Perry with complaint of pelvic pain.   US done that showed a midline pelvic mass 11 x 6 cm.      Referred to GYN ONC for further management.       Also seen at [facility] in 2017 for WWE. Normal exam. Pessary was changed.   Seen in 2017 at [facility] for vaginitis. US showed , the uterus appears within normal in size measuring 5.9 x 3.5 x 2.2 cm with hypoechoic area noted over the posterior fundus adjacent to the endometrial canal measuring 5 x 4.4 mm, being avascular and possibly a small fibroid. In the cervical region, a 2 x 1.4 x 0.7 cm prevascular lesion is noted consistent with probable nabothian cyst. Endometrial canal is minimally thickened measuring 3 mm in AP dimension and complex, possibly due to cyclical change.  Neither ovary could be visualized. No adnexal mass nor pelvic fluid could be detected.  Impressions:  Small hypoechoic area noted over the posterior uterine fundus adjacent to the endometrial canal, possibly a small fibroid. Probable  nabothian type cyst in the cervical region.  No definite further abnormality could be detected, although neither ovary could be visualized.     Biopsy of lesion on posterior vagina was normal.    Hospital Course:  10/30/2017 HD#1. Underwent ANTONIO as scheduled, uncomplicated. See op note for details.   10/31/2017 - POD#1, no acute events. Meeting appropriate post-op milestones.    Interval History: Doing well this morning, up in chair. Recently ambulated to restroom. Pain well controlled on IV tylenol/ibuprofen, s/p dilaudid x 3 overnight. Reports one episode of nausea which resolved with meds, no emesis. Tolerating clears. States she had some anxiety this morning which she thinks was related to pain, has since resolved. Romo was just removed, has not yet urinated. Denies flatus. Denies fever, chills, CP, SOB. BP elevated to 180 systolic overnight, s/p hydralazine IV x 1.    Scheduled Meds:   aspirin  81 mg Oral QAM    atorvastatin  20 mg Oral QAM    escitalopram oxalate  20 mg Oral QAM    fluticasone-vilanterol  1 puff Inhalation QAM    hydrALAZINE  5 mg Intravenous Once    ibuprofen  800 mg Intravenous Q6H    mupirocin  1 g Nasal BID    pantoprazole  40 mg Oral QAM    senna-docusate 8.6-50 mg  1 tablet Oral BID     Continuous Infusions:   dextrose 5 % and 0.45 % NaCl with KCl 20 mEq 100 mL/hr at 10/30/17 2304     PRN Meds:albuterol, diphenhydrAMINE, HYDROmorphone, ondansetron, promethazine (PHENERGAN) IVPB, simethicone, sumatriptan    Review of patient's allergies indicates:   Allergen Reactions    Norvasc [amlodipine] Swelling    Buspar [buspirone] Other (See Comments)     Suicidal thoughts    Lisinopril Other (See Comments)     Unsure of reaction    Naproxen Other (See Comments)     Weakness, dizziness, chest pain    Pravastatin sodium      Chest pain  And headache       Objective:     Vital Signs (Most Recent):  Temp: 97.6 °F (36.4 °C) (10/31/17 0433)  Pulse: 73 (10/31/17 0433)  Resp: 18 (10/31/17  0433)  BP: 128/84 (10/31/17 0433)  SpO2: (!) 94 % (10/31/17 0433) Vital Signs (24h Range):  Temp:  [97.5 °F (36.4 °C)-98.2 °F (36.8 °C)] 97.6 °F (36.4 °C)  Pulse:  [50-74] 73  Resp:  [16-23] 18  SpO2:  [94 %-100 %] 94 %  BP: (128-191)/(78-99) 128/84     Weight: 87.1 kg (192 lb 1.6 oz)  Body mass index is 31.01 kg/m².    Intake/Output - Last 3 Shifts       10/29 0700 - 10/30 0659 10/30 0700 - 10/31 0659    P.O.  75    I.V. (mL/kg)  2583.3 (29.7)    IV Piggyback  450    Total Intake(mL/kg)  3108.3 (35.7)    Urine (mL/kg/hr)  4825    Blood  200    Total Output   5025    Net   -1916.7                   Physical Exam:   Constitutional: She is oriented to person, place, and time. She appears well-developed and well-nourished.    HENT:   Head: Normocephalic and atraumatic.     Neck: Normal range of motion.    Cardiovascular: Normal rate, regular rhythm and normal heart sounds.     Pulmonary/Chest: Effort normal and breath sounds normal. No respiratory distress. She has no wheezes. She has no rales.        Abdominal: Soft. She exhibits abdominal incision (bandage in place, c/d/i, very small amount of drainage on bandage (marked)). She exhibits no distension. There is no tenderness. There is no rebound and no guarding.   Hypoactive BS             Musculoskeletal: Normal range of motion and moves all extremeties.   TEDs in place       Neurological: She is alert and oriented to person, place, and time.    Skin: Skin is warm and dry.    Psychiatric: She has a normal mood and affect.       Lines/Drains/Airways     Peripheral Intravenous Line                 Peripheral IV - Single Lumen 10/30/17 Right Hand 1 day         Peripheral IV - Single Lumen 10/30/17 0815 Left Antecubital less than 1 day                Laboratory:  Lab Results   Component Value Date    WBC 8.23 10/20/2017    HGB 12.5 10/20/2017    HCT 39.8 10/20/2017    MCV 86 10/20/2017     10/20/2017         Assessment/Plan:     * S/P exploratory  laparotomy/Hysterectomy/MARTINA (10/30/2017)    POD#1  - Continue routine post-op advances  - Pain: Continue IV tylenol and ibuprofen x 24h, transition to po pain meds later today, pain well controlled  - UOP: 250cc/hr over 10 hours, lucio removed this AM  - Encourage ambulation  - Encourage IS  - Pre Op H/H 12/39, follow up labs  - Continue IVF until tolerating more po  - Clear liquid diet, will advance as tolerated today  - Antiemetics prn nausea/vomiting.  - Simethicone prn for gas pain  - BMP/CBC pending          Pelvic mass in female    - suspect benign pathology, follow up final path        Left knee pain    - Radiates down leg, worse when edema is present  - consult ortho        Pure hypercholesterolemia    - continue home statin        Essential hypertension    BP: (128-191)/(78-99) 128/84  BP elevated overnight, s/p hydralazine 5mg IV x 1  Restart home meds: losartan and HCTZ          GERD (gastroesophageal reflux disease)    - protonix daily        Depression    - stable, continue home meds  - anxiety issues, will consult psych          VTE Risk Mitigation         Ordered     Place sequential compression device  Until discontinued      10/30/17 1142     Place MARY hose  Until discontinued      10/30/17 1142     Medium Risk of VTE  Once      10/30/17 1142          Was lucio catheter removed? Yes    Erica Lisa MD  Gynecologic Oncology  [facility]

## 2017-10-31 NOTE — ASSESSMENT & PLAN NOTE
- BP: ()/(54-99) 100/62  - no prn meds since overnight hydralazine  - contt home meds: losartan and HCTZ

## 2017-10-31 NOTE — ANESTHESIA POSTPROCEDURE EVALUATION
"Anesthesia Post Evaluation    Patient: Lindy Sparks    Procedure(s) Performed: Procedure(s) (LRB):  HYSTERECTOMY-ABDOMINAL-TOTAL (ANTONIO) (N/A)  LYSIS-ADHESION of small bowel adhensions 20 minutes.  LYMPHADENECTOMY (Right)    Final Anesthesia Type: general  Patient location during evaluation: PACU  Patient participation: Yes- Able to Participate  Level of consciousness: awake and alert  Post-procedure vital signs: reviewed and stable  Pain management: adequate  Airway patency: patent  PONV status at discharge: No PONV  Anesthetic complications: no      Cardiovascular status: blood pressure returned to baseline  Respiratory status: unassisted  Hydration status: euvolemic  Follow-up not needed.        Visit Vitals  BP (!) 91/54 (BP Location: Left arm, Patient Position: Lying)   Pulse 83   Temp 36.8 °C (98.3 °F) (Oral)   Resp 16   Ht 5' 6" (1.676 m)   Wt 87.1 kg (192 lb 1.6 oz)   LMP  (LMP Unknown)   SpO2 (!) 93%   Breastfeeding? No   BMI 31.01 kg/m²       Pain/Jamie Score: Pain Assessment Performed: Yes (10/31/2017 11:22 AM)  Presence of Pain: complains of pain/discomfort (10/31/2017  2:15 PM)  Pain Rating Prior to Med Admin: 10 (10/31/2017  2:39 PM)  Pain Rating Post Med Admin: 8 (10/31/2017  9:41 AM)  Jamie Score: 10 (10/30/2017  3:00 PM)      "

## 2017-11-01 VITALS
WEIGHT: 192.13 LBS | OXYGEN SATURATION: 95 % | SYSTOLIC BLOOD PRESSURE: 109 MMHG | HEART RATE: 65 BPM | HEIGHT: 66 IN | BODY MASS INDEX: 30.88 KG/M2 | TEMPERATURE: 98 F | DIASTOLIC BLOOD PRESSURE: 56 MMHG | RESPIRATION RATE: 16 BRPM

## 2017-11-01 PROBLEM — F41.9 ANXIETY DISORDER: Status: ACTIVE | Noted: 2017-11-01

## 2017-11-01 PROBLEM — M25.561 PAIN IN BOTH KNEES: Status: ACTIVE | Noted: 2017-08-28

## 2017-11-01 PROBLEM — F43.10 PTSD (POST-TRAUMATIC STRESS DISORDER): Status: ACTIVE | Noted: 2017-11-01

## 2017-11-01 PROCEDURE — 25000003 PHARM REV CODE 250: Performed by: STUDENT IN AN ORGANIZED HEALTH CARE EDUCATION/TRAINING PROGRAM

## 2017-11-01 PROCEDURE — 63600175 PHARM REV CODE 636 W HCPCS: Performed by: STUDENT IN AN ORGANIZED HEALTH CARE EDUCATION/TRAINING PROGRAM

## 2017-11-01 PROCEDURE — 25000003 PHARM REV CODE 250: Performed by: OBSTETRICS & GYNECOLOGY

## 2017-11-01 PROCEDURE — 99024 POSTOP FOLLOW-UP VISIT: CPT | Mod: ,,, | Performed by: OBSTETRICS & GYNECOLOGY

## 2017-11-01 RX ORDER — HYDROMORPHONE HYDROCHLORIDE 1 MG/ML
0.5 INJECTION, SOLUTION INTRAMUSCULAR; INTRAVENOUS; SUBCUTANEOUS EVERY 4 HOURS PRN
Status: DISCONTINUED | OUTPATIENT
Start: 2017-11-01 | End: 2017-11-01 | Stop reason: HOSPADM

## 2017-11-01 RX ORDER — IBUPROFEN 600 MG/1
600 TABLET ORAL EVERY 6 HOURS PRN
Qty: 60 TABLET | Refills: 1 | Status: SHIPPED | OUTPATIENT
Start: 2017-11-01 | End: 2018-01-04 | Stop reason: SDUPTHER

## 2017-11-01 RX ORDER — OXYCODONE AND ACETAMINOPHEN 5; 325 MG/1; MG/1
1 TABLET ORAL EVERY 4 HOURS PRN
Qty: 45 TABLET | Refills: 0 | Status: SHIPPED | OUTPATIENT
Start: 2017-11-01 | End: 2017-11-13 | Stop reason: SDUPTHER

## 2017-11-01 RX ADMIN — FLUTICASONE FUROATE AND VILANTEROL TRIFENATATE 1 PUFF: 100; 25 POWDER RESPIRATORY (INHALATION) at 06:11

## 2017-11-01 RX ADMIN — OXYCODONE AND ACETAMINOPHEN 1 TABLET: 10; 325 TABLET ORAL at 03:11

## 2017-11-01 RX ADMIN — ASPIRIN 81 MG: 81 TABLET, COATED ORAL at 06:11

## 2017-11-01 RX ADMIN — DIPHENHYDRAMINE HYDROCHLORIDE 25 MG: 25 CAPSULE ORAL at 08:11

## 2017-11-01 RX ADMIN — MUPIROCIN 1 G: 20 OINTMENT TOPICAL at 09:11

## 2017-11-01 RX ADMIN — SIMETHICONE CHEW TAB 80 MG 80 MG: 80 TABLET ORAL at 11:11

## 2017-11-01 RX ADMIN — IBUPROFEN 600 MG: 600 TABLET, FILM COATED ORAL at 11:11

## 2017-11-01 RX ADMIN — DEXTROSE MONOHYDRATE, SODIUM CHLORIDE, AND POTASSIUM CHLORIDE: 50; 4.5; 1.49 INJECTION, SOLUTION INTRAVENOUS at 06:11

## 2017-11-01 RX ADMIN — STANDARDIZED SENNA CONCENTRATE AND DOCUSATE SODIUM 1 TABLET: 8.6; 5 TABLET, FILM COATED ORAL at 08:11

## 2017-11-01 RX ADMIN — OXYCODONE AND ACETAMINOPHEN 1 TABLET: 10; 325 TABLET ORAL at 11:11

## 2017-11-01 RX ADMIN — IBUPROFEN 600 MG: 600 TABLET, FILM COATED ORAL at 06:11

## 2017-11-01 RX ADMIN — ALPRAZOLAM 0.5 MG: 0.25 TABLET ORAL at 03:11

## 2017-11-01 RX ADMIN — POLYETHYLENE GLYCOL 3350 17 G: 17 POWDER, FOR SOLUTION ORAL at 08:11

## 2017-11-01 RX ADMIN — ALPRAZOLAM 0.5 MG: 0.25 TABLET ORAL at 11:11

## 2017-11-01 RX ADMIN — PANTOPRAZOLE SODIUM 40 MG: 40 TABLET, DELAYED RELEASE ORAL at 06:11

## 2017-11-01 RX ADMIN — ESCITALOPRAM 20 MG: 20 TABLET, FILM COATED ORAL at 06:11

## 2017-11-01 RX ADMIN — HYDROMORPHONE HYDROCHLORIDE 0.5 MG: 1 INJECTION, SOLUTION INTRAMUSCULAR; INTRAVENOUS; SUBCUTANEOUS at 08:11

## 2017-11-01 RX ADMIN — OXYCODONE AND ACETAMINOPHEN 1 TABLET: 10; 325 TABLET ORAL at 06:11

## 2017-11-01 RX ADMIN — ATORVASTATIN CALCIUM 20 MG: 20 TABLET, FILM COATED ORAL at 06:11

## 2017-11-01 NOTE — NURSING
@ 2010  -Pt arrived via stretcher to rm 855 following a test. Pt had labored respirations and was referring to this as a panic attack. Oxygen increased to 6L min via NC - emotional support given to patient - VS taken - feels like she can't breath - encouraged slow deep breathing - family at bedside

## 2017-11-01 NOTE — SUBJECTIVE & OBJECTIVE
Interval History:  Did well overnight; had panic attacks when went down for knee x-rays approximately 1930, none thereafter. Otherwise tolerated spaghetti for dinner without  N/V, continues to ambulate and void without issue. Denies flatus or BM. Pain well controlled, required dilaudid x 1 overnight.     Scheduled Meds:   aspirin  81 mg Oral QAM    atorvastatin  20 mg Oral QAM    escitalopram oxalate  20 mg Oral QAM    fluticasone-vilanterol  1 puff Inhalation QAM    hydrALAZINE  5 mg Intravenous Once    hydroCHLOROthiazide  12.5 mg Oral Daily    ibuprofen  600 mg Oral Q6H    losartan  100 mg Oral QAM    mupirocin  1 g Nasal BID    pantoprazole  40 mg Oral QAM    polyethylene glycol  17 g Oral Daily    senna-docusate 8.6-50 mg  1 tablet Oral BID     Continuous Infusions:   dextrose 5 % and 0.45 % NaCl with KCl 20 mEq 100 mL/hr at 10/31/17 2004     PRN Meds:albuterol, ALPRAZolam, diphenhydrAMINE, HYDROmorphone, ondansetron, oxyCODONE-acetaminophen, oxyCODONE-acetaminophen, promethazine (PHENERGAN) IVPB, simethicone, sumatriptan    Review of patient's allergies indicates:   Allergen Reactions    Norvasc [amlodipine] Swelling    Buspar [buspirone] Other (See Comments)     Suicidal thoughts    Lisinopril Other (See Comments)     Unsure of reaction    Naproxen Other (See Comments)     Weakness, dizziness, chest pain    Pravastatin sodium      Chest pain  And headache       Objective:     Vital Signs (Most Recent):  Temp: 98.2 °F (36.8 °C) (11/01/17 0313)  Pulse: 71 (11/01/17 0313)  Resp: 18 (11/01/17 0313)  BP: 127/78 (11/01/17 0313)  SpO2: 96 % (11/01/17 0313) Vital Signs (24h Range):  Temp:  [97.7 °F (36.5 °C)-98.5 °F (36.9 °C)] 98.2 °F (36.8 °C)  Pulse:  [68-85] 71  Resp:  [16-24] 18  SpO2:  [93 %-100 %] 96 %  BP: ()/(54-78) 127/78     Weight: 87.1 kg (192 lb 1.6 oz)  Body mass index is 31.01 kg/m².    Intake/Output - Last 3 Shifts       10/30 0700 - 10/31 0659 10/31 0700 - 11/01 0659    P.O.  75 635    I.V. (mL/kg) 2583.3 (29.7) 2550 (29.3)    IV Piggyback 450     Total Intake(mL/kg) 3108.3 (35.7) 3185 (36.6)    Urine (mL/kg/hr) 4825 300 (0.1)    Blood 200     Total Output 5025 300    Net -1916.7 +2885          Urine Occurrence  2 x    Stool Occurrence  0 x             Physical Exam:   Constitutional: She is oriented to person, place, and time. She appears well-developed and well-nourished.    HENT:   Head: Normocephalic and atraumatic.     Neck: Normal range of motion.    Cardiovascular: Normal rate, regular rhythm and normal heart sounds.     Pulmonary/Chest: Effort normal and breath sounds normal. No respiratory distress. She has no wheezes. She has no rales.        Abdominal: Soft. She exhibits abdominal incision (steri strips in place, c/d/i; mild bruising). She exhibits no distension. There is tenderness (appropriate). There is no rebound and no guarding.   Hypoactive BS             Musculoskeletal: Normal range of motion and moves all extremeties.   TEDs in place       Neurological: She is alert and oriented to person, place, and time.    Skin: Skin is warm and dry.    Psychiatric: She has a normal mood and affect.       Lines/Drains/Airways     Peripheral Intravenous Line                 Peripheral IV - Single Lumen 10/30/17 0815 Left Antecubital 1 day                Laboratory:  Lab Results   Component Value Date    WBC 7.86 10/31/2017    HGB 12.2 10/31/2017    HCT 38.6 10/31/2017    MCV 82 10/31/2017     10/31/2017

## 2017-11-01 NOTE — HPI
Lindy Sparks is a 67 y.o. female POD 1 s/p ANTONIO who presents to orthopedic service with 5 week history of intermittent pain and swelling of the knees bilaterally. She reports that the pain began gradually about 5 weeks ago. She describes the pain as burning pain in her knee that radiates to her hip. It is worse towards the end of the day and with activity. It improves with pain medicine. At the time of my exam she denies any pain in either joint. She denies fevers, chills, nausea, or vomiting. She has a history of arthritis and has had a steroid injection into her left knee in the past by her PCP which did not improve her pain. She does not have a history of gout. No recent infections.
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
## 2017-11-01 NOTE — CONSULTS
[facility]  Orthopedics  Consult Note    Patient Name: Lindy Sparks  MRN: [id number]  Admission Date: 10/30/2017  Hospital Length of Stay: 2 days  Attending Provider: Junior Buitrago MD  Primary Care Provider: Lindy Amor MD    Patient information was obtained from patient.     Inpatient consult to Orthopedics  Consult performed by: RAÚL SERRANO  Consult ordered by: RACHAEL WEI        Subjective:     Principal Problem:S/P exploratory laparotomy    Chief Complaint: No chief complaint on file.       HPI: Lindy Sparks is a 67 y.o. female POD 1 s/p ANTONIO who presents to orthopedic service with 5 week history of intermittent pain and swelling of the knees bilaterally. She reports that the pain began gradually about 5 weeks ago. She describes the pain as burning pain in her knee that radiates to her hip. It is worse towards the end of the day and with activity. It improves with pain medicine. At the time of my exam she denies any pain in either joint. She denies fevers, chills, nausea, or vomiting. She has a history of arthritis and has had a steroid injection into her left knee in the past by her PCP which did not improve her pain. She does not have a history of gout. No recent infections. No history of trauma.    Past Medical History:   Diagnosis Date    Acid reflux     Anxiety     Arthritis     Coronary artery disease     Depression     Ectopic pregnancy     History of psychiatric hospitalization     Hx of psychiatric care     Hypertension     Migraine     Pelvic mass in female 10/8/2017    Psychiatric problem     Suicide attempt     Therapy        Past Surgical History:   Procedure Laterality Date     SECTION      CHOLECYSTECTOMY      COLONOSCOPY      ECTOPIC PREGNANCY SURGERY      TONSILLECTOMY         Review of patient's allergies indicates:   Allergen Reactions    Norvasc [amlodipine] Swelling    Buspar [buspirone] Other (See Comments)      Suicidal thoughts    Lisinopril Other (See Comments)     Unsure of reaction    Naproxen Other (See Comments)     Weakness, dizziness, chest pain    Pravastatin sodium      Chest pain  And headache       Current Facility-Administered Medications   Medication    albuterol inhaler 2 puff    ALPRAZolam tablet 0.5 mg    aspirin EC tablet 81 mg    atorvastatin tablet 20 mg    dextrose 5 % and 0.45 % NaCl with KCl 20 mEq infusion    diphenhydrAMINE capsule 25 mg    escitalopram oxalate tablet 20 mg    fluticasone-vilanterol 100-25 mcg/dose diskus inhaler 1 puff    hydrALAZINE injection 5 mg    hydroCHLOROthiazide tablet 12.5 mg    HYDROmorphone injection 0.5 mg    ibuprofen tablet 600 mg    losartan tablet 100 mg    mupirocin 2 % ointment 1 g    ondansetron injection 4 mg    oxyCODONE-acetaminophen  mg per tablet 1 tablet    oxyCODONE-acetaminophen 5-325 mg per tablet 1 tablet    pantoprazole EC tablet 40 mg    polyethylene glycol packet 17 g    promethazine (PHENERGAN) 6.25 mg in dextrose 5 % 50 mL IVPB    senna-docusate 8.6-50 mg per tablet 1 tablet    simethicone chewable tablet 80 mg    sumatriptan tablet 50 mg     Family History     Problem Relation (Age of Onset)    Heart disease Father    Hyperlipidemia Mother    No Known Problems Daughter, Son, Daughter    Stroke Mother, Father, Brother        Social History Main Topics    Smoking status: Never Smoker    Smokeless tobacco: Never Used    Alcohol use 0.6 oz/week     1 Standard drinks or equivalent per week      Comment: occ    Drug use: No    Sexual activity: Yes     Partners: Male     Birth control/ protection: Surgical     ROS   See ROS documented by admitting provider    Objective:     Vital Signs (Most Recent):  Temp: 98.5 °F (36.9 °C) (10/31/17 2246)  Pulse: 74 (10/31/17 2246)  Resp: 16 (10/31/17 1524)  BP: (!) 99/57 (10/31/17 2246)  SpO2: 100 % (10/31/17 2246) Vital Signs (24h Range):  Temp:  [97.6 °F (36.4 °C)-98.5 °F (36.9  "°C)] 98.5 °F (36.9 °C)  Pulse:  [68-85] 74  Resp:  [16-18] 16  SpO2:  [93 %-100 %] 100 %  BP: ()/(54-84) 99/57     Weight: 87.1 kg (192 lb 1.6 oz)  Height: 5' 6" (167.6 cm)  Body mass index is 31.01 kg/m².      Intake/Output Summary (Last 24 hours) at 11/01/17 0236  Last data filed at 10/31/17 1830   Gross per 24 hour   Intake             3185 ml   Output             2075 ml   Net             1110 ml       Ortho/SPM Exam   HEENT: normocephalic, atraumatic  Resp: no increased work of breathing  CV: regular rate and rhythm  MSK: moves b/l upper extremities well    left lower extremity:  Skin intact  No ecchymoses, erythema, or swelling, no effusion  Sensation intact SP/DP/T/Sural/Saphenous nerves  Motor intact EHL/FHL/TA/gastroc  Mild TTP around anteromedial knee  No pain with ROM  Palpable DP/PT pulses    right lower extremity:  Skin intact  No ecchymoses, erythema, or swelling, no effusion  Sensation intact SP/DP/T/Sural/Saphenous nerves  Motor intact EHL/FHL/TA/gastroc  No TTP  No pain with ROM  Palpable DP/PT pulses      Significant Labs: All pertinent labs within the past 24 hours have been reviewed.    Significant Imaging: I have reviewed all pertinent imaging results/findings. Mild bilateral knee arthritis.    Assessment/Plan:     Left knee pain    Lindy Sparks is a 67 y.o. female with mild bilateral knee arthritis  - No concern for septic joint or gout based on exam with mild tenderness and minimal pain with ROM in afebrile patient without joint effusions  - Clinical picture consistent with bilateral knee arthritis  - Recommend NSAIDs and physical therapy  - F/u in orthopedic clinic as outpatient if symptoms continue            Thank you for your consult.    Dina Ngo MD  Orthopedics  [facility]      "

## 2017-11-01 NOTE — NURSING
Breathing improved - pt reports that the had 12 panic attacks today - pt asking why she is having so many panic attacks and why they are worst than usual. I asked patient and family what she does at home to help with attacks, pt and family states that she lets them happen and drinks a soft drink after. She is now resting in bed - states she is ready to eat dinner. C/o itching - request benadryl and would like something for pain.

## 2017-11-01 NOTE — SUBJECTIVE & OBJECTIVE
"Interval History:     States that she had "5 panic attacks" yesterday. Received PRN Xanax once yesterday and twice today. Reports good mood and good sleep overnight. States panic attacks feel like her "throat is closing." She states this is not consistent with her prior panic attacks that she has at home. Denies feeling anxious currently.      Family History     Problem Relation (Age of Onset)    Heart disease Father    Hyperlipidemia Mother    No Known Problems Daughter, Son, Daughter    Stroke Mother, Father, Brother        Social History Main Topics    Smoking status: Never Smoker    Smokeless tobacco: Never Used    Alcohol use 0.6 oz/week     1 Standard drinks or equivalent per week      Comment: occ    Drug use: No    Sexual activity: Yes     Partners: Male     Birth control/ protection: Surgical     Psychotherapeutics     Start     Stop Route Frequency Ordered    10/31/17 1730  ALPRAZolam tablet 0.5 mg      -- Oral Every 8 hours PRN 10/31/17 1630    10/30/17 1145  escitalopram oxalate tablet 20 mg      -- Oral Every morning 10/30/17 1143          Review of Systems   See above.          Objective:     Vital Signs (Most Recent):  Temp: 98 °F (36.7 °C) (11/01/17 1120)  Pulse: 65 (11/01/17 1120)  Resp: 16 (11/01/17 1120)  BP: (!) 109/56 (11/01/17 1120)  SpO2: 95 % (11/01/17 1120) Vital Signs (24h Range):  Temp:  [98 °F (36.7 °C)-98.5 °F (36.9 °C)] 98 °F (36.7 °C)  Pulse:  [65-85] 65  Resp:  [16-24] 16  SpO2:  [94 %-100 %] 95 %  BP: ()/(56-78) 109/56     Height: 5' 6" (167.6 cm)  Weight: 87.1 kg (192 lb 1.6 oz)  Body mass index is 31.01 kg/m².      Intake/Output Summary (Last 24 hours) at 11/01/17 1254  Last data filed at 11/01/17 1121   Gross per 24 hour   Intake             3140 ml   Output                0 ml   Net             3140 ml       Physical Exam      CONSTITUTIONAL  General Appearance: in scrubs, NAD, calm, cooperative    PSYCHIATRIC   Level of Consciousness: alert  Orientation: oriented to " "person, place, situation  Grooming: fair  Psychomotor Behavior: no agitation, retardation  Speech: normal rate, volume, tone  Language: English, no asphasia  Mood: "good"  Affect: full, reactive  Thought Process: linear, logical  Associations: cohesive  Thought Content: denies SI  Memory: intact to recent and remote events  Attention: not distracted  Fund of Knowledge: appropriate for education level  Insight: fair  Judgment: fair    "

## 2017-11-01 NOTE — NURSING
ROADTEST  O2- Pt on room air sating 95%  Activity-Pt ambulates independently  Devices- Pt not being sent home on any devices.   Tolerating-Pt tolerating PO diet and medication.  Elimination-Pt voiding and having bowel movements independently.  Self Care- Pt able to do personal hygiene independently  Teaching- Pt instructed on when to take home meds.     Pt's peripheral IV removed. Cath tip intact. Pt tolerated well. AVS and prescriptions given to pt. All questions answered. Pt verbalized understanding. Pt awaiting transport at this time.

## 2017-11-01 NOTE — ASSESSMENT & PLAN NOTE
- BP: ()/(54-78) 127/78  - no prn meds since overnight hydralazine  - cont home meds: losartan and HCTZ

## 2017-11-01 NOTE — SUBJECTIVE & OBJECTIVE
Past Medical History:   Diagnosis Date    Acid reflux     Anxiety     Arthritis     Coronary artery disease     Depression     Ectopic pregnancy     History of psychiatric hospitalization     Hx of psychiatric care     Hypertension     Migraine     Pelvic mass in female 10/8/2017    Psychiatric problem     Suicide attempt     Therapy        Past Surgical History:   Procedure Laterality Date     SECTION      CHOLECYSTECTOMY      COLONOSCOPY      ECTOPIC PREGNANCY SURGERY      TONSILLECTOMY         Review of patient's allergies indicates:   Allergen Reactions    Norvasc [amlodipine] Swelling    Buspar [buspirone] Other (See Comments)     Suicidal thoughts    Lisinopril Other (See Comments)     Unsure of reaction    Naproxen Other (See Comments)     Weakness, dizziness, chest pain    Pravastatin sodium      Chest pain  And headache       Current Facility-Administered Medications   Medication    albuterol inhaler 2 puff    ALPRAZolam tablet 0.5 mg    aspirin EC tablet 81 mg    atorvastatin tablet 20 mg    dextrose 5 % and 0.45 % NaCl with KCl 20 mEq infusion    diphenhydrAMINE capsule 25 mg    escitalopram oxalate tablet 20 mg    fluticasone-vilanterol 100-25 mcg/dose diskus inhaler 1 puff    hydrALAZINE injection 5 mg    hydroCHLOROthiazide tablet 12.5 mg    HYDROmorphone injection 0.5 mg    ibuprofen tablet 600 mg    losartan tablet 100 mg    mupirocin 2 % ointment 1 g    ondansetron injection 4 mg    oxyCODONE-acetaminophen  mg per tablet 1 tablet    oxyCODONE-acetaminophen 5-325 mg per tablet 1 tablet    pantoprazole EC tablet 40 mg    polyethylene glycol packet 17 g    promethazine (PHENERGAN) 6.25 mg in dextrose 5 % 50 mL IVPB    senna-docusate 8.6-50 mg per tablet 1 tablet    simethicone chewable tablet 80 mg    sumatriptan tablet 50 mg     Family History     Problem Relation (Age of Onset)    Heart disease Father    Hyperlipidemia Mother    No Known  "Problems Daughter, Son, Daughter    Stroke Mother, Father, Brother        Social History Main Topics    Smoking status: Never Smoker    Smokeless tobacco: Never Used    Alcohol use 0.6 oz/week     1 Standard drinks or equivalent per week      Comment: occ    Drug use: No    Sexual activity: Yes     Partners: Male     Birth control/ protection: Surgical     ROS   See ROS documented by admitting provider    Objective:     Vital Signs (Most Recent):  Temp: 98.5 °F (36.9 °C) (10/31/17 2246)  Pulse: 74 (10/31/17 2246)  Resp: 16 (10/31/17 1524)  BP: (!) 99/57 (10/31/17 2246)  SpO2: 100 % (10/31/17 2246) Vital Signs (24h Range):  Temp:  [97.6 °F (36.4 °C)-98.5 °F (36.9 °C)] 98.5 °F (36.9 °C)  Pulse:  [68-85] 74  Resp:  [16-18] 16  SpO2:  [93 %-100 %] 100 %  BP: ()/(54-84) 99/57     Weight: 87.1 kg (192 lb 1.6 oz)  Height: 5' 6" (167.6 cm)  Body mass index is 31.01 kg/m².      Intake/Output Summary (Last 24 hours) at 11/01/17 0236  Last data filed at 10/31/17 1830   Gross per 24 hour   Intake             3185 ml   Output             2075 ml   Net             1110 ml       Ortho/SPM Exam   HEENT: normocephalic, atraumatic  Resp: no increased work of breathing  CV: regular rate and rhythm  MSK: moves b/l upper extremities well    left lower extremity:  Skin intact  No ecchymoses, erythema, or swelling, no effusion  Sensation intact SP/DP/T/Sural/Saphenous nerves  Motor intact EHL/FHL/TA/gastroc  Mild TTP around anteromedial knee  No pain with ROM  Palpable DP/PT pulses    right lower extremity:  Skin intact  No ecchymoses, erythema, or swelling, no effusion  Sensation intact SP/DP/T/Sural/Saphenous nerves  Motor intact EHL/FHL/TA/gastroc  No TTP  No pain with ROM  Palpable DP/PT pulses      Significant Labs: All pertinent labs within the past 24 hours have been reviewed.    Significant Imaging: I have reviewed all pertinent imaging results/findings. Mild bilateral knee arthritis.  "

## 2017-11-01 NOTE — DISCHARGE SUMMARY
[facility]  Gynecologic Oncology  Discharge Summary    Patient Name: Lindy Sparks  MRN: [id number]  Admission Date: 10/30/2017  Hospital Length of Stay: 2 days  Discharge Date and Time:  2017 11:19 AM  Attending Physician: Junior Buitrago MD   Discharging Provider: Latosha Cooper MD  Primary Care Provider: Lindy Amor MD    HPI:   67yr old para 3 ( x 3) presents for scheduled surgery secondary to a pelvic mass.  Was visiting in [place] in 2017 and had pelvic pain radiating to back. Seen at [facility] ED and CT scan of abdomen and pelvis was done. This showed a 8.1 x 7.6 cm rounded mass noted within the vagina and appeared contiguous with the cervix. Patient has copy of report but no images.      10/3/2017 saw Dr. Maximilian Perry with complaint of pelvic pain.   US done that showed a midline pelvic mass 11 x 6 cm.      Referred to GYN ONC for further management.       Also seen at [facility] in 2017 for WWE. Normal exam. Pessary was changed.   Seen in 2017 at [facility] for vaginitis. US showed , the uterus appears within normal in size measuring 5.9 x 3.5 x 2.2 cm with hypoechoic area noted over the posterior fundus adjacent to the endometrial canal measuring 5 x 4.4 mm, being avascular and possibly a small fibroid. In the cervical region, a 2 x 1.4 x 0.7 cm prevascular lesion is noted consistent with probable nabothian cyst. Endometrial canal is minimally thickened measuring 3 mm in AP dimension and complex, possibly due to cyclical change.  Neither ovary could be visualized. No adnexal mass nor pelvic fluid could be detected.  Impressions:  Small hypoechoic area noted over the posterior uterine fundus adjacent to the endometrial canal, possibly a small fibroid. Probable nabothian type cyst in the cervical region.  No definite further abnormality could be detected, although neither ovary could be visualized.     Biopsy of lesion on posterior vagina  was normal.    Hospital Course:  10/30/2017 - Underwent ANTONIO as scheduled, uncomplicated. See op note for details.   10/31/2017 - POD#1, no acute events. Meeting appropriate post-op milestones. Psychiatry consulted for recurrent panic attacks and depression, recommend continue Lexapro. Orthopedics consulted for knee pain, recommend NSAIDs and outpatient PT.   11/01/2017 - POD#2. No acute events; tolerating regular diet, pain well controlled, ambulating and voiding without issue, +flatus. Patient ready for discharge with follow-up with Dr. Buitrago.     Procedure(s) (LRB):  HYSTERECTOMY-ABDOMINAL-TOTAL (ANTONIO) (N/A)  LYSIS-ADHESION of small bowel adhensions 20 minutes.  LYMPHADENECTOMY (Right)     Consults         Status Ordering Provider     Inpatient consult to Orthopedics  Once     Provider:  (Not yet assigned)    Completed RACHAEL WEI     Inpatient consult to Psychiatry  Once     Provider:  (Not yet assigned)    Completed RACHAEL WEI          Significant Diagnostic Studies: Labs:   CBC     Recent Labs  Lab 10/31/17  0516   WBC 7.86   HGB 12.2   HCT 38.6          Pending Diagnostic Studies:     None        Final Active Diagnoses:    Diagnosis Date Noted POA    PRINCIPAL PROBLEM:  S/P exploratory laparotomy/Hysterectomy/MARTINA (10/30/2017) [Z98.890] 10/30/2017 Not Applicable    Pelvic mass in female [R19.00] 10/11/2017 Yes    Pain in both knees [M25.561, M25.562] 08/28/2017 Yes    Pure hypercholesterolemia [E78.00] 03/29/2017 Yes    Essential hypertension [I10] 06/05/2016 Yes     Chronic    GERD (gastroesophageal reflux disease) [K21.9] 11/24/2014 Yes     Chronic    Depression [F32.9] 07/24/2014 Yes     Chronic      Problems Resolved During this Admission:    Diagnosis Date Noted Date Resolved POA        Does this patient meet criteria for extended DVT prophylaxis? No, because no evidence of malignancy    Discharged Condition: good    Disposition: Home or Self Care    Follow  Up:  Follow-up Information     Lloyd Carter MD. Schedule an appointment as soon as possible for a visit in 4 weeks.    Specialties:  Obstetrics, Gynecology, Gynecologic Oncology  Why:  post-operative checkup  Contact information:  [address]  [facility] [address] [phone]                 Patient Instructions:     Diet general     Activity as tolerated   Order Comments: Avoid strenuous activity and housework. Nothing in vagina (tampons, douching, intercourse) until cleared by Dr. Carter.     Call MD for:  temperature >100.4     Call MD for:  persistent nausea and vomiting or diarrhea     Call MD for:  severe uncontrolled pain     Call MD for:  redness, tenderness, or signs of infection (pain, swelling, redness, odor or green/yellow discharge around incision site)     Call MD for:  difficulty breathing or increased cough     Call MD for:  worsening rash     Call MD for:  persistent dizziness, light-headedness, or visual disturbances     Call MD for:  increased confusion or weakness       Medications:  Reconciled Home Medications:   Current Discharge Medication List      START taking these medications    Details   ibuprofen (ADVIL,MOTRIN) 600 MG tablet Take 1 tablet (600 mg total) by mouth every 6 (six) hours as needed for Pain.  Qty: 60 tablet, Refills: 1      oxyCODONE-acetaminophen (PERCOCET) 5-325 mg per tablet Take 1 tablet by mouth every 4 (four) hours as needed.  Qty: 45 tablet, Refills: 0         CONTINUE these medications which have NOT CHANGED    Details   albuterol (PROVENTIL) 2.5 mg /3 mL (0.083 %) nebulizer solution Take 3 mLs (2.5 mg total) by nebulization every 6 (six) hours as needed for Wheezing.  Qty: 300 mL, Refills: 11      !! albuterol (VENTOLIN HFA) 90 mcg/actuation inhaler INHALE 2 PUFFS INTO THE LUNGS EVERY 6 HOURS AS NEEDED WHEEZING  Qty: 18 g, Refills: 0    Comments: Medically necessary override.r06.2      atorvastatin (LIPITOR) 20 MG tablet Take 1 tablet (20 mg total) by mouth  once daily.  Qty: 30 tablet, Refills: 5    Comments: Medically necessary override.e78.00      conjugated estrogens (PREMARIN) vaginal cream Place a pea-sized amount in vagina every other night. Patient unsure of dose  Qty: 45 g, Refills: 02    Comments: Medically necessary override.N95.2  Associated Diagnoses: Vaginal lesion      DOCUSATE CALCIUM (STOOL SOFTENER ORAL) Take by mouth as needed. Patient unsure of dose      escitalopram oxalate (LEXAPRO) 20 MG tablet Take 1 tablet (20 mg total) by mouth once daily.  Qty: 30 tablet, Refills: 5    Comments: Medically necessary override.F32.9  Associated Diagnoses: Recurrent major depressive disorder, in partial remission      fluticasone-vilanterol (BREO) 100-25 mcg/dose diskus inhaler Inhale 1 puff into the lungs once daily. Controller  Qty: 28 each, Refills: 5    Comments: Medically necessary override.R06.9      gabapentin (NEURONTIN) 400 MG capsule Take 1 capsule (400 mg total) by mouth every evening.  Qty: 30 capsule, Refills: 5    Comments: Medically necessary override.G6/9      hydroCHLOROthiazide (MICROZIDE) 12.5 mg capsule TAKE 1 CAPSULE BY MOUTH EVERY DAY  Qty: 30 capsule, Refills: 3    Comments: Medically necessary override.I10      losartan (COZAAR) 100 MG tablet Take 1 tablet (100 mg total) by mouth once daily.  Qty: 90 tablet, Refills: 3    Comments: Medically necessary override.I10      methocarbamol (ROBAXIN) 500 MG Tab every other day.   Refills: 2      pantoprazole (PROTONIX) 40 MG tablet Take 1 tablet (40 mg total) by mouth once daily. For reflux  Qty: 30 tablet, Refills: 5    Comments: Medically necessary override.K21.9      sumatriptan (IMITREX) 50 MG tablet Take 50 mg by mouth every 2 (two) hours as needed for Migraine.      !! VENTOLIN HFA 90 mcg/actuation inhaler INHALE 2 PUFFS INTO THE LUNGS EVERY 6 HOURS AS NEEDED WHEEZING  Qty: 18 g, Refills: 0      aspirin (ECOTRIN) 81 MG EC tablet Take 1 tablet (81 mg total) by mouth once daily.  Qty: 30  tablet, Refills: 5    Comments: Medically necessary override.I25.10  Associated Diagnoses: Cerebral arteriosclerosis with history of previous stroke      ergocalciferol (ERGOCALCIFEROL) 50,000 unit Cap Take 1 capsule (50,000 Units total) by mouth every 7 days.  Qty: 4 capsule, Refills: 5    Comments: Medically necessary override.       !! - Potential duplicate medications found. Please discuss with provider.      STOP taking these medications       acetaminophen (TYLENOL) 325 MG tablet Comments:   Reason for Stopping:         ondansetron (ZOFRAN) 4 MG tablet Comments:   Reason for Stopping:               Latosha Cooper MD  Gynecologic Oncology  [facility]

## 2017-11-01 NOTE — NURSING
Notified JOSE ANGEL on call that patient reported 12 panic attacks today - obtained a order for Benadryl - reported Laxative -

## 2017-11-01 NOTE — ASSESSMENT & PLAN NOTE
POD#2  - Continue routine post-op advances  - Pain: continue current PO regimen w/ hydromorphone IV for BTP  - Voiding and ambulating without issue  - Encourage ambulation  - Encourage IS  - Pre Op H/H 12/39, unchanged  - Continue IVF until tolerating more po  - Tolerating regular diet without N/V  - Antiemetics prn nausea/vomiting (not requiring)  - Simethicone prn for gas pain
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
## 2017-11-01 NOTE — PROGRESS NOTES
[facility]  Gynecologic Oncology  Progress Note      Patient Name: Lindy Sparks  MRN: [id number]  Admission Date: 10/30/2017  Hospital Length of Stay: 2 days  Attending Provider: Junior Buitrago MD  Primary Care Provider: Lindy Amor MD  Principal Problem: S/P exploratory laparotomy    Follow-up For: Procedure(s) (LRB):  HYSTERECTOMY-ABDOMINAL-TOTAL (ANTONIO) (N/A)  LYSIS-ADHESION of small bowel adhensions 20 minutes.  LYMPHADENECTOMY (Right)  Post-Operative Day: 2 Days Post-Op  Subjective:      History of Present Illness:  67yr old para 3 ( x 3) presents for scheduled surgery secondary to a pelvic mass.  Was visiting in [place] in 2017 and had pelvic pain radiating to back. Seen at [facility] ED and CT scan of abdomen and pelvis was done. This showed a 8.1 x 7.6 cm rounded mass noted within the vagina and appeared contiguous with the cervix. Patient has copy of report but no images.      10/3/2017 saw Dr. Maximilian Perry with complaint of pelvic pain.   US done that showed a midline pelvic mass 11 x 6 cm.      Referred to GYN ONC for further management.       Also seen at [facility] in 2017 for WWE. Normal exam. Pessary was changed.   [place] [facility] [place] showed , the uterus appears within normal in size measuring 5.9 x 3.5 x 2.2 cm with hypoechoic area noted over the posterior fundus adjacent to the endometrial canal measuring 5 x 4.4 mm, being avascular and possibly a small fibroid. In the cervical region, a 2 x 1.4 x 0.7 cm prevascular lesion is noted consistent with probable nabothian cyst. Endometrial canal is minimally thickened measuring 3 mm in AP dimension and complex, possibly due to cyclical change.  Neither ovary could be visualized. No adnexal mass nor pelvic fluid could be detected.  Impressions:  Small hypoechoic area noted over the posterior uterine fundus adjacent to the endometrial canal, possibly a small fibroid. Probable  nabothian type cyst in the cervical region.  No definite further abnormality could be detected, although neither ovary could be visualized.     Biopsy of lesion on posterior vagina was normal.    Hospital Course:  10/30/2017 - Underwent ANTONIO as scheduled, uncomplicated. See op note for details.   10/31/2017 - POD#1, no acute events. Meeting appropriate post-op milestones. Psychiatry consulted for recurrent panic attacks and depression, recommend continue Lexapro. Orthopedics consulted for knee pain, recommend NSAIDs and outpatient PT.   11/01/2017 - POD#2. No acute events; tolerating regular diet, pain well controlled, ambulating and voiding without issue. Awaiting return of bowel function.     Interval History:  Did well overnight; had panic attacks when went down for knee x-rays approximately 1930, none thereafter. Otherwise tolerated spaghetti for dinner without  N/V, continues to ambulate and void without issue. Denies flatus or BM. Pain well controlled, required dilaudid x 1 overnight.     Scheduled Meds:   aspirin  81 mg Oral QAM    atorvastatin  20 mg Oral QAM    escitalopram oxalate  20 mg Oral QAM    fluticasone-vilanterol  1 puff Inhalation QAM    hydrALAZINE  5 mg Intravenous Once    hydroCHLOROthiazide  12.5 mg Oral Daily    ibuprofen  600 mg Oral Q6H    losartan  100 mg Oral QAM    mupirocin  1 g Nasal BID    pantoprazole  40 mg Oral QAM    polyethylene glycol  17 g Oral Daily    senna-docusate 8.6-50 mg  1 tablet Oral BID     Continuous Infusions:   dextrose 5 % and 0.45 % NaCl with KCl 20 mEq 100 mL/hr at 10/31/17 2004     PRN Meds:albuterol, ALPRAZolam, diphenhydrAMINE, HYDROmorphone, ondansetron, oxyCODONE-acetaminophen, oxyCODONE-acetaminophen, promethazine (PHENERGAN) IVPB, simethicone, sumatriptan    Review of patient's allergies indicates:   Allergen Reactions    Norvasc [amlodipine] Swelling    Buspar [buspirone] Other (See Comments)     Suicidal thoughts    Lisinopril Other (See  Comments)     Unsure of reaction    Naproxen Other (See Comments)     Weakness, dizziness, chest pain    Pravastatin sodium      Chest pain  And headache       Objective:     Vital Signs (Most Recent):  Temp: 98.2 °F (36.8 °C) (11/01/17 0313)  Pulse: 71 (11/01/17 0313)  Resp: 18 (11/01/17 0313)  BP: 127/78 (11/01/17 0313)  SpO2: 96 % (11/01/17 0313) Vital Signs (24h Range):  Temp:  [97.7 °F (36.5 °C)-98.5 °F (36.9 °C)] 98.2 °F (36.8 °C)  Pulse:  [68-85] 71  Resp:  [16-24] 18  SpO2:  [93 %-100 %] 96 %  BP: ()/(54-78) 127/78     Weight: 87.1 kg (192 lb 1.6 oz)  Body mass index is 31.01 kg/m².    Intake/Output - Last 3 Shifts       10/30 0700 - 10/31 0659 10/31 0700 - 11/01 0659    P.O. 75 635    I.V. (mL/kg) 2583.3 (29.7) 2550 (29.3)    IV Piggyback 450     Total Intake(mL/kg) 3108.3 (35.7) 3185 (36.6)    Urine (mL/kg/hr) 4825 300 (0.1)    Blood 200     Total Output 5025 300    Net -1916.7 +2885          Urine Occurrence  2 x    Stool Occurrence  0 x             Physical Exam:   Constitutional: She is oriented to person, place, and time. She appears well-developed and well-nourished.    HENT:   Head: Normocephalic and atraumatic.     Neck: Normal range of motion.    Cardiovascular: Normal rate, regular rhythm and normal heart sounds.     Pulmonary/Chest: Effort normal and breath sounds normal. No respiratory distress. She has no wheezes. She has no rales.        Abdominal: Soft. She exhibits abdominal incision (steri strips in place, c/d/i; mild bruising). She exhibits no distension. There is tenderness (appropriate). There is no rebound and no guarding.   Hypoactive BS             Musculoskeletal: Normal range of motion and moves all extremeties.   TEDs in place       Neurological: She is alert and oriented to person, place, and time.    Skin: Skin is warm and dry.    Psychiatric: She has a normal mood and affect.       Lines/Drains/Airways     Peripheral Intravenous Line                 Peripheral IV -  Single Lumen 10/30/17 0815 Left Antecubital 1 day                Laboratory:  Lab Results   Component Value Date    WBC 7.86 10/31/2017    HGB 12.2 10/31/2017    HCT 38.6 10/31/2017    MCV 82 10/31/2017     10/31/2017         Assessment/Plan:     * S/P exploratory laparotomy/Hysterectomy/MARTINA (10/30/2017)    POD#2  - Continue routine post-op advances  - Pain: continue current PO regimen w/ hydromorphone IV for BTP  - Voiding and ambulating without issue  - Encourage ambulation  - Encourage IS  - Pre Op H/H 12/39, unchanged  - Continue IVF until tolerating more po  - Tolerating regular diet without N/V  - Antiemetics prn nausea/vomiting (not requiring)  - Simethicone prn for gas pain          Pelvic mass in female    - suspect benign pathology, follow up final path        Pain in both knees    - s/o ortho consult  - suspect mild bilateral arthritis   - recommend NSAIDs and outpatient PT        Pure hypercholesterolemia    - continue home statin        Essential hypertension    - BP: ()/(54-78) 127/78  - no prn meds since overnight hydralazine  - cont home meds: losartan and HCTZ          GERD (gastroesophageal reflux disease)    - protonix daily        Depression    - s/p psychiatry consult, appreciate recs  - continue home lexapro 20mg  - recommended Buspar 7.5 TID, however patient reports having SI on Buspar in past, will hold off  - xanax 0.5mg prn for panic attacks while in house          VTE Risk Mitigation         Ordered     Place sequential compression device  Until discontinued      10/30/17 1142     Place MARY hose  Until discontinued      10/30/17 1142     Medium Risk of VTE  Once      10/30/17 1142          Was lucio catheter removed? Yes yesterday    Latosha Cooper MD  Gynecologic Oncology  [facility]

## 2017-11-01 NOTE — ASSESSMENT & PLAN NOTE
Lindy Sparks is a 67 y.o. female with mild bilateral knee arthritis  - No concern for septic joint or gout based on exam with mild tenderness and minimal pain with ROM in afebrile patient without joint effusions  - Clinical picture consistent with bilateral knee arthritis  - Recommend NSAIDs and physical therapy  - F/u in orthopedic clinic as outpatient if symptoms continue

## 2017-11-03 ENCOUNTER — TELEPHONE (OUTPATIENT)
Dept: GYNECOLOGIC ONCOLOGY | Facility: CLINIC | Age: 67
End: 2017-11-03

## 2017-11-03 LAB
BLD PROD TYP BPU: NORMAL
BLD PROD TYP BPU: NORMAL
BLOOD UNIT EXPIRATION DATE: NORMAL
BLOOD UNIT EXPIRATION DATE: NORMAL
BLOOD UNIT TYPE CODE: 6200
BLOOD UNIT TYPE CODE: 6200
BLOOD UNIT TYPE: NORMAL
BLOOD UNIT TYPE: NORMAL
CODING SYSTEM: NORMAL
CODING SYSTEM: NORMAL
DISPENSE STATUS: NORMAL
DISPENSE STATUS: NORMAL
TRANS ERYTHROCYTES VOL PATIENT: NORMAL ML
TRANS ERYTHROCYTES VOL PATIENT: NORMAL ML

## 2017-11-03 NOTE — TELEPHONE ENCOUNTER
Returned call.   Daughter concern that patient is not voiding enough.   She voided twice yesterday.   Denies dysuria or feeling of incomplete voiding.     I told her to make sure that her mother is drinking enough fluids.     To go to the ED if she has any concerns.     Continues to pass flatus. Denies fever or chills.     Denies abdominal pain except for pain at incision site.     Daughter was also concerned about dx of pneumonia which was on her discharge sheet.   I explained that she did not have pneumonia. Not certain why that is on the discharge sheet.

## 2017-11-08 ENCOUNTER — TELEPHONE (OUTPATIENT)
Dept: GYNECOLOGIC ONCOLOGY | Facility: CLINIC | Age: 67
End: 2017-11-08

## 2017-11-08 NOTE — TELEPHONE ENCOUNTER
----- Message from Junior Buitrago MD sent at 11/7/2017  5:28 PM CST -----  Patient informed of benign pathology.

## 2017-11-10 ENCOUNTER — TELEPHONE (OUTPATIENT)
Dept: GYNECOLOGIC ONCOLOGY | Facility: CLINIC | Age: 67
End: 2017-11-10

## 2017-11-10 NOTE — TELEPHONE ENCOUNTER
"----- Message from Alisson Andrade sent at 11/10/2017 10:45 AM CST -----  Contact: Patient herself  X 1st Request  _  2nd Request  _  3rd Request    Who:  Lindy Sparks (mrn# [id number])    Why: Patient called requesting a call to confirm that the appointment scheduled for Tuesday 11/14/2017 is scheduled correctly. Says, "the appointment should be in 4 weeks."  Please give a call back at your earliest convenience.        THANKS!    What Number to Call Back:  [phone]    When to Expect a call back: (Before the end of the day)   -- if the call is after 12:00, the call back will be tomorrow.                          "

## 2017-11-13 DIAGNOSIS — G89.18 POST-OP PAIN: Primary | ICD-10-CM

## 2017-11-13 NOTE — TELEPHONE ENCOUNTER
----- Message from Francie Ireland sent at 11/13/2017  2:52 PM CST -----  Contact: Karlee Sparks Daughter       X  1st Request  _  2nd Request  _  3rd Request    Please refill the medication(s) listed below. Please call the patient when the prescription(s) is ready for  at this phone number [phone].           Medication #1 ibuprofen (ADVIL,MOTRIN) 600 MG tablet    Medication #2 oxyCODONE-acetaminophen (PERCOCET) 5-325 mg per tablet      Preferred Pharmacy:  [facility] [id number] [place] [address] [id number] AT [facility]

## 2017-11-14 ENCOUNTER — TELEPHONE (OUTPATIENT)
Dept: GYNECOLOGIC ONCOLOGY | Facility: CLINIC | Age: 67
End: 2017-11-14

## 2017-11-14 RX ORDER — OXYCODONE AND ACETAMINOPHEN 5; 325 MG/1; MG/1
1 TABLET ORAL EVERY 6 HOURS PRN
Qty: 30 TABLET | Refills: 0 | Status: SHIPPED | OUTPATIENT
Start: 2017-11-14 | End: 2017-12-29 | Stop reason: ALTCHOICE

## 2017-11-14 RX ORDER — IBUPROFEN 600 MG/1
600 TABLET ORAL EVERY 6 HOURS PRN
Qty: 60 TABLET | Refills: 1 | OUTPATIENT
Start: 2017-11-14

## 2017-11-30 ENCOUNTER — OFFICE VISIT (OUTPATIENT)
Dept: GYNECOLOGIC ONCOLOGY | Facility: CLINIC | Age: 67
End: 2017-11-30
Payer: MEDICAID

## 2017-11-30 VITALS
DIASTOLIC BLOOD PRESSURE: 86 MMHG | BODY MASS INDEX: 30.25 KG/M2 | WEIGHT: 187.38 LBS | SYSTOLIC BLOOD PRESSURE: 123 MMHG | HEART RATE: 82 BPM

## 2017-11-30 DIAGNOSIS — Z98.890 S/P EXPLORATORY LAPAROTOMY: Primary | ICD-10-CM

## 2017-11-30 PROCEDURE — 99213 OFFICE O/P EST LOW 20 MIN: CPT | Mod: PBBFAC | Performed by: OBSTETRICS & GYNECOLOGY

## 2017-11-30 PROCEDURE — 99999 PR PBB SHADOW E&M-EST. PATIENT-LVL III: CPT | Mod: PBBFAC,,, | Performed by: OBSTETRICS & GYNECOLOGY

## 2017-11-30 PROCEDURE — 99024 POSTOP FOLLOW-UP VISIT: CPT | Mod: ,,, | Performed by: OBSTETRICS & GYNECOLOGY

## 2017-11-30 RX ORDER — TRAZODONE HYDROCHLORIDE 50 MG/1
TABLET ORAL
Refills: 5 | COMMUNITY
Start: 2017-11-15 | End: 2017-11-30

## 2017-11-30 NOTE — PROGRESS NOTES
Subjective:       Patient ID: Lindy Sparks is a 67 y.o. female.    Chief Complaint: Post-op Evaluation (4 week post-op)    HPI     Patient comes in today for post op visit after ANTONIO. Final pathology was UTERUS AND CERVIX:  ATROPHIC ENDOMETRIUM.  MYOMETRIUM WITH NO PATHOLOGIC CHANGE.  CERVIX WITH GRANULATION TISSUE, ACUTE AND CHRONIC INFLAMMATION, HEMORRHAGE, VASCULAR  CONGESTION AND HEMOSIDERIN LADEN MACROPHAGES.  #2 RIGHT PELVIC LYMPH NODE:  ONE REACTIVE LYMPH NODE.    Prior BSO.     Concerned about incision.     Review of Systems   Constitutional: Negative for chills and fever.       Objective:     /86   Pulse 82   Wt 85 kg (187 lb 6.4 oz)   LMP  (LMP Unknown) Comment: 20 yrs ago, no PMB  BMI 30.25 kg/m²     Physical Exam   Constitutional: She is oriented to person, place, and time.   Abdominal: Soft.   Healing midline incision.    Genitourinary:   Genitourinary Comments: Bimanual exam:  Vulva: no lesions. Normal appearance  Urethra: Normal size and location. No lesions  Bladder: No masses or tenderness.  Vagina:healing vaginal cuff.  Cervix: absent.   Uterus: absent.  Adnexa: no masses.  Rectovaginal: normal.      Neurological: She is alert and oriented to person, place, and time.   Psychiatric: She has a normal mood and affect. Her behavior is normal. Judgment and thought content normal.       Assessment:       1. S/P exploratory laparotomy/Hysterectomy/MARTINA (10/30/2017)        Plan:   S/P exploratory laparotomy/Hysterectomy/MARTINA (10/30/2017)    Normal post op visit.   Asked to refrain from intercourse for another 3 weeks.

## 2017-12-27 RX ORDER — IBUPROFEN 600 MG/1
600 TABLET ORAL EVERY 6 HOURS PRN
Qty: 60 TABLET | Refills: 1 | OUTPATIENT
Start: 2017-12-27

## 2018-01-03 ENCOUNTER — TELEPHONE (OUTPATIENT)
Dept: OBSTETRICS AND GYNECOLOGY | Facility: CLINIC | Age: 68
End: 2018-01-03

## 2018-01-03 NOTE — TELEPHONE ENCOUNTER
Received faxed prescription refill request from [facility] for Ibuprofen. Pharmacy info verified.

## 2018-01-04 DIAGNOSIS — M25.562 CHRONIC PAIN OF BOTH KNEES: Primary | ICD-10-CM

## 2018-01-04 DIAGNOSIS — M25.561 CHRONIC PAIN OF BOTH KNEES: Primary | ICD-10-CM

## 2018-01-04 DIAGNOSIS — G89.29 CHRONIC PAIN OF BOTH KNEES: Primary | ICD-10-CM

## 2018-01-04 RX ORDER — IBUPROFEN 600 MG/1
600 TABLET ORAL EVERY 6 HOURS PRN
Qty: 60 TABLET | Refills: 1 | Status: SHIPPED | OUTPATIENT
Start: 2018-01-04 | End: 2018-03-28

## 2018-02-18 ENCOUNTER — TELEPHONE (OUTPATIENT)
Dept: OBSTETRICS AND GYNECOLOGY | Facility: HOSPITAL | Age: 68
End: 2018-02-18

## 2018-02-19 NOTE — TELEPHONE ENCOUNTER
Called patients daughter to discuss CT scheduled for tomorrow. CT has not been approved by insurance yet.    Attempted to call for [id number] but they are not open on the weekends.    Will ask [facility] team to attempt to call in AM and re-schedule CT for later date this week.    Patients daughter in agreement. States she is still having significant pain and desires CT to be done.    Erica Lisa M.D.  PGY-4 OB/GYN

## 2018-03-28 PROBLEM — I36.1 NON-RHEUMATIC TRICUSPID VALVE INSUFFICIENCY: Status: ACTIVE | Noted: 2018-03-28

## 2018-05-18 NOTE — NURSING
Pt calm - ate dinner -oxygen decreased to 2L - family at the bedside - although medicated for pain and resting/wakes up and states her pain is a 10 - encouraged rest/sleep - vitals early to promote sleep- no needs at this time    no difficulties

## 2018-05-21 PROBLEM — M54.9 BACK PAIN: Status: ACTIVE | Noted: 2018-05-21

## 2018-06-25 PROBLEM — R19.00 PELVIC MASS IN FEMALE: Status: RESOLVED | Noted: 2017-10-08 | Resolved: 2018-06-25

## 2018-06-25 PROBLEM — L81.8 TATTOO: Status: RESOLVED | Noted: 2017-10-19 | Resolved: 2018-06-25

## 2018-06-25 PROBLEM — K43.2 INCISIONAL HERNIA: Status: ACTIVE | Noted: 2018-06-25

## 2018-06-25 PROBLEM — R07.89 ATYPICAL CHEST PAIN: Status: RESOLVED | Noted: 2017-10-19 | Resolved: 2018-06-25

## 2018-06-27 PROBLEM — G44.219 EPISODIC TENSION-TYPE HEADACHE, NOT INTRACTABLE: Status: ACTIVE | Noted: 2018-06-27

## 2018-06-27 PROBLEM — J98.01 BRONCHOSPASM: Status: RESOLVED | Noted: 2017-06-22 | Resolved: 2018-06-27

## 2018-07-17 PROBLEM — R60.9 EDEMA: Status: RESOLVED | Noted: 2017-10-19 | Resolved: 2018-07-17

## 2018-07-20 PROBLEM — K43.2 INCISIONAL HERNIA, WITHOUT OBSTRUCTION OR GANGRENE: Status: ACTIVE | Noted: 2018-07-20

## 2018-09-26 PROBLEM — M70.50 ANSERINE BURSITIS: Status: ACTIVE | Noted: 2018-09-26

## 2018-09-26 PROBLEM — I25.10 CORONARY ARTERY DISEASE INVOLVING NATIVE CORONARY ARTERY OF NATIVE HEART WITHOUT ANGINA PECTORIS: Status: ACTIVE | Noted: 2018-09-26

## 2018-09-26 PROBLEM — K58.2 IRRITABLE BOWEL SYNDROME WITH BOTH CONSTIPATION AND DIARRHEA: Status: ACTIVE | Noted: 2018-09-26

## 2018-12-26 PROBLEM — M17.11 PRIMARY OSTEOARTHRITIS OF RIGHT KNEE: Status: ACTIVE | Noted: 2018-12-26

## 2019-03-27 PROBLEM — M17.0 PRIMARY OSTEOARTHRITIS OF BOTH KNEES: Status: ACTIVE | Noted: 2017-08-28

## 2019-03-27 PROBLEM — M17.11 PRIMARY OSTEOARTHRITIS OF RIGHT KNEE: Status: RESOLVED | Noted: 2018-12-26 | Resolved: 2019-03-27

## 2019-05-08 PROBLEM — R19.00 PELVIC MASS IN FEMALE: Status: RESOLVED | Noted: 2017-10-11 | Resolved: 2019-05-08

## 2019-05-22 PROBLEM — S83.207A TEAR OF LEFT MENISCUS AS CURRENT INJURY: Status: ACTIVE | Noted: 2019-05-22

## 2019-09-25 PROBLEM — F33.3 SEVERE RECURRENT MAJOR DEPRESSION WITH PSYCHOTIC FEATURES: Status: ACTIVE | Noted: 2019-09-25

## 2019-09-25 PROBLEM — M15.9 PRIMARY OSTEOARTHRITIS INVOLVING MULTIPLE JOINTS: Status: ACTIVE | Noted: 2019-09-25

## 2019-09-25 PROBLEM — M15.0 PRIMARY OSTEOARTHRITIS INVOLVING MULTIPLE JOINTS: Status: ACTIVE | Noted: 2019-09-25

## 2019-09-25 PROBLEM — M23.300: Status: ACTIVE | Noted: 2019-09-25

## 2019-09-25 PROBLEM — I63.432 CEREBROVASCULAR ACCIDENT (CVA) DUE TO EMBOLISM OF LEFT POSTERIOR CEREBRAL ARTERY: Status: ACTIVE | Noted: 2019-09-25

## 2020-04-02 PROBLEM — M54.9 BACK PAIN: Status: RESOLVED | Noted: 2018-05-21 | Resolved: 2020-04-02

## 2020-07-06 PROBLEM — Z01.818 PRE-OP TESTING: Status: ACTIVE | Noted: 2020-07-06

## 2020-07-20 PROBLEM — Z01.818 PRE-OP EVALUATION: Status: ACTIVE | Noted: 2020-07-20

## 2020-07-20 PROBLEM — I48.0 PAROXYSMAL ATRIAL FIBRILLATION: Status: ACTIVE | Noted: 2020-07-20

## 2020-07-20 PROBLEM — R06.09 DOE (DYSPNEA ON EXERTION): Status: RESOLVED | Noted: 2017-08-23 | Resolved: 2020-07-20

## 2020-07-20 PROBLEM — I25.10 CORONARY ARTERY DISEASE INVOLVING NATIVE CORONARY ARTERY OF NATIVE HEART WITHOUT ANGINA PECTORIS: Status: RESOLVED | Noted: 2018-09-26 | Resolved: 2020-07-20

## 2020-07-20 PROBLEM — M79.605 PAIN IN BOTH LOWER EXTREMITIES: Status: ACTIVE | Noted: 2020-07-20

## 2020-07-20 PROBLEM — R07.9 CHEST PAIN: Status: ACTIVE | Noted: 2020-07-20

## 2020-07-20 PROBLEM — R55 NEAR SYNCOPE: Status: ACTIVE | Noted: 2020-07-20

## 2020-07-20 PROBLEM — Z01.818 PRE-OP TESTING: Status: RESOLVED | Noted: 2020-07-06 | Resolved: 2020-07-20

## 2020-07-20 PROBLEM — R00.1 BRADYCARDIA: Status: ACTIVE | Noted: 2020-07-20

## 2020-07-20 PROBLEM — R42 LIGHTHEADEDNESS: Status: ACTIVE | Noted: 2020-07-20

## 2020-07-20 PROBLEM — R06.09 DYSPNEA ON EXERTION: Status: ACTIVE | Noted: 2020-07-20

## 2020-07-20 PROBLEM — I25.10 NON-OCCLUSIVE CORONARY ARTERY DISEASE: Status: ACTIVE | Noted: 2020-07-20

## 2020-07-20 PROBLEM — M79.604 PAIN IN BOTH LOWER EXTREMITIES: Status: ACTIVE | Noted: 2020-07-20

## 2020-09-14 PROBLEM — Z01.818 PRE-OP EVALUATION: Status: RESOLVED | Noted: 2020-07-20 | Resolved: 2020-09-14

## 2020-09-14 PROBLEM — I36.1 NON-RHEUMATIC TRICUSPID VALVE INSUFFICIENCY: Status: RESOLVED | Noted: 2018-03-28 | Resolved: 2020-09-14

## 2020-09-14 PROBLEM — I49.5 SINUS NODE DYSFUNCTION: Status: ACTIVE | Noted: 2020-09-14

## 2020-09-14 PROBLEM — F41.9 ANXIETY: Status: RESOLVED | Noted: 2017-09-29 | Resolved: 2020-09-14

## 2020-09-14 PROBLEM — R00.2 PALPITATIONS: Status: ACTIVE | Noted: 2020-09-14

## 2020-09-14 PROBLEM — R00.1 BRADYCARDIA: Status: RESOLVED | Noted: 2020-07-20 | Resolved: 2020-09-14

## 2020-09-14 PROBLEM — R00.1 SINUS BRADYCARDIA: Status: ACTIVE | Noted: 2020-09-14

## 2020-09-14 PROBLEM — Z79.01 CHRONIC ANTICOAGULATION: Status: ACTIVE | Noted: 2020-09-14

## 2020-09-14 PROBLEM — I51.7 RIGHT VENTRICULAR HYPERTROPHY: Status: ACTIVE | Noted: 2020-09-14

## 2020-10-01 PROBLEM — R42 LIGHTHEADEDNESS: Status: RESOLVED | Noted: 2020-07-20 | Resolved: 2020-10-01

## 2020-10-29 PROBLEM — R93.89 ABNORMAL CXR: Status: ACTIVE | Noted: 2020-10-29

## 2020-12-12 ENCOUNTER — OFFICE VISIT (OUTPATIENT)
Dept: URGENT CARE | Facility: CLINIC | Age: 70
End: 2020-12-12
Payer: MEDICAID

## 2020-12-12 VITALS
HEART RATE: 64 BPM | RESPIRATION RATE: 20 BRPM | BODY MASS INDEX: 29.35 KG/M2 | SYSTOLIC BLOOD PRESSURE: 114 MMHG | HEIGHT: 67 IN | WEIGHT: 187 LBS | TEMPERATURE: 97 F | DIASTOLIC BLOOD PRESSURE: 70 MMHG | OXYGEN SATURATION: 96 %

## 2020-12-12 DIAGNOSIS — R06.02 SOB (SHORTNESS OF BREATH): Primary | ICD-10-CM

## 2020-12-12 DIAGNOSIS — J02.9 PHARYNGITIS, UNSPECIFIED ETIOLOGY: ICD-10-CM

## 2020-12-12 DIAGNOSIS — J40 BRONCHITIS: ICD-10-CM

## 2020-12-12 LAB
CTP QC/QA: YES
SARS-COV-2 RDRP RESP QL NAA+PROBE: NEGATIVE

## 2020-12-12 PROCEDURE — U0002 COVID-19 LAB TEST NON-CDC: HCPCS | Mod: QW,S$GLB,, | Performed by: FAMILY MEDICINE

## 2020-12-12 PROCEDURE — 99203 PR OFFICE/OUTPT VISIT, NEW, LEVL III, 30-44 MIN: ICD-10-PCS | Mod: S$GLB,,, | Performed by: FAMILY MEDICINE

## 2020-12-12 PROCEDURE — U0002: ICD-10-PCS | Mod: QW,S$GLB,, | Performed by: FAMILY MEDICINE

## 2020-12-12 PROCEDURE — 99203 OFFICE O/P NEW LOW 30 MIN: CPT | Mod: S$GLB,,, | Performed by: FAMILY MEDICINE

## 2020-12-12 RX ORDER — CEFDINIR 300 MG/1
300 CAPSULE ORAL 2 TIMES DAILY
Qty: 20 CAPSULE | Refills: 0 | Status: SHIPPED | OUTPATIENT
Start: 2020-12-12 | End: 2020-12-22

## 2020-12-12 RX ORDER — DEXTROMETHORPHAN HBR, GUAIFENESIN AND PSEUDOEPHEDRINE HCL 60; 380; 20 MG/1; MG/1; MG/1
1 TABLET ORAL EVERY 6 HOURS PRN
Qty: 20 TABLET | Refills: 0 | Status: SHIPPED | OUTPATIENT
Start: 2020-12-12 | End: 2022-01-07

## 2020-12-12 RX ORDER — PREDNISONE 20 MG/1
40 TABLET ORAL DAILY
Qty: 10 TABLET | Refills: 0 | Status: SHIPPED | OUTPATIENT
Start: 2020-12-12 | End: 2020-12-17

## 2020-12-12 NOTE — PATIENT INSTRUCTIONS
Please drink plenty of fluids.  Please get plenty of rest.  Please return here or go to the Emergency Department for any concerns or worsening of condition.  If you were given wait & see antibiotics, please wait 3-5 days before taking them, and only take them if your symptoms have worsened or not improved.  If you do begin taking the antibiotics, please take them to completion.  If you were prescribed antibiotics, please take them to completion.  If you were prescribed a narcotic medication, do not drive or operate heavy equipment or machinery while taking these medications.    You were given a decongestant (RESCON or POLY VENT Dm).  If your insurance does not cover it or you cannot afford it, it is ok to use the over the counter products listed below.  If you do not have Hypertension or any history of palpitations, it is ok to take over the counter Sudafed or Mucinex D or Allegra-D or Claritin-D or Zyrtec-D.  If you do take one of the above, it is ok to combine that with plain over the counter Mucinex or Allegra or Claritin or Zyrtec.  If for example you are taking Zyrtec -D, you can combine that with Mucinex, but not Mucinex-D.  If you are taking Mucinex-D, you can combine that with plain Allegra or Claritin or Zyrtec.   If you do have Hypertension or palpitations, it is safe to take Coricidin HBP for relief of sinus symptoms.    We recommend you take over the counter Flonase (Fluticasone) or another nasally inhaled steroid unless you are already taking one.  Nasal irrigation with a saline spray or Netti Pot like device per their directions is also recommended.  If not allergic, please take over the counter Tylenol (Acetaminophen) and/or Motrin (Ibuprofen) as directed for control of pain and/or fever.    Robitussin DM 2 teas every 4 hours as needed for cough.  If you  smoke, please stop smoking.    Please follow up with your primary care doctor or specialist as needed.  Lindy Amor MD  [phone]    You must  understand that you have received treatment at an Urgent Care facility only, and that you may be  released before all of your medical problems are known or treated. Urgent Care facilities are not equipped to  handle life threatening emergencies. It is recommended that you seek care at an Emergency Department for  further evaluation of worsening or concerning symptoms, or possibly life threatening conditions as  discussed.    Pharyngitis: Strep (Presumed)    You have pharyngitis (sore throat). The cause is thought to be the streptococcus, or strep, bacterium. Strep throat infection can cause throat pain that is worse when swallowing, aching all over, headache, and fever. The infection may be spread by coughing, kissing, or touching others after touching your mouth or nose. Antibiotic medications are given to treat the infection.  Home care  · Rest at home. Drink plenty of fluids to avoid dehydration.  · No work or school for the first 2 days of taking the antibiotics. After this time, you will not be contagious. You can then return to work or school if you are feeling better.   · The antibiotic medication must be taken for the full 10 days, even if you feel better. This is very important to ensure the infection is treated. It is also important to prevent drug-resistant organisms from developing. If you were given an antibiotic shot, no more antibiotics are needed.  · You may use acetaminophen or ibuprofen to control pain or fever, unless another medicine was prescribed for this. If you have chronic liver or kidney disease or ever had a stomach ulcer or GI bleeding, talk with your doctor before using these medicines.  · Throat lozenges or a throat-numbing sprays can help reduce throat pain. Gargling with warm salt water can also help. Dissolve 1/2 teaspoon of salt in 1 8 ounce glass of warm water.   · Avoid salty or spicy foods, which can irritate the throat.  Follow-up care  Follow up with your healthcare provider or  our staff if you are not improving over the next week.  When to seek medical advice  Call your healthcare provider right away if any of these occur:  · Fever as directed by your doctor.   · New or worsening ear pain, sinus pain, or headache  · Painful lumps in the back of neck  · Stiff neck  · Lymph nodes are getting larger  · Inability to swallow liquids, excessive drooling, or inability to open mouth wide due to throat pain  · Signs of dehydration (very dark urine or no urine, sunken eyes, dizziness)  · Trouble breathing or noisy breathing  · Muffled voice  · New rash  Date Last Reviewed: 4/13/2015  © 9417-3156 [employer]. [address] [place] [address], [place] [address]. All rights reserved. This information is not intended as a substitute for professional medical care. Always follow your healthcare professional's instructions.          Acute Bronchitis  Your healthcare provider has told you that you have acute bronchitis. Bronchitis is infection or inflammation of the bronchial tubes (airways in the lungs). Normally, air moves easily in and out of the airways. Bronchitis narrows the airways, making it harder for air to flow in and out of the lungs. This causes symptoms such as shortness of breath, coughing up yellow or green mucus, and wheezing. Bronchitis can be acute or chronic. Acute means the condition comes on quickly and goes away in a short time, usually within 3 to 10 days. Chronic means a condition lasts a long time and often comes back.    What causes acute bronchitis?  Acute bronchitis almost always starts as a viral respiratory infection, such as a cold or the flu. Certain factors make it more likely for a cold or flu to turn into bronchitis. These include being very young, being elderly, having a heart or lung problem, or having a weak immune system. Cigarette smoking also makes bronchitis more likely.  When bronchitis develops, the airways become swollen. The airways may also become  infected with bacteria. This is known as a secondary infection.  Diagnosing acute bronchitis  Your healthcare provider will examine you and ask about your symptoms and health history. You may also have a sputum culture to test the fluid in your lungs. Chest X-rays may be done to look for infection in the lungs.  Treating acute bronchitis  Bronchitis usually clears up as the cold or flu goes away. You can help feel better faster by doing the following:  · Take medicine as directed. You may be told to take ibuprofen or other over-the-counter medicines. These help relieve inflammation in your bronchial tubes. Your healthcare provider may prescribe an inhaler to help open up the bronchial tubes. Most of the time, acute bronchitis is caused by a viral infection. Antibiotics are usually not prescribed for viral infections.  · Drink plenty of fluids, such as water, juice, or warm soup. Fluids loosen mucus so that you can cough it up. This helps you breathe more easily. Fluids also prevent dehydration.  · Make sure you get plenty of rest.  · Do not smoke. Do not allow anyone else to smoke in your home.  Recovery and follow-up  Follow up with your doctor as you are told. You will likely feel better in a week or two. But a dry cough can linger beyond that time. Let your doctor know if you still have symptoms (other than a dry cough) after 2 weeks, or if youre prone to getting bronchial infections. Take steps to protect yourself from future infections. These steps include stopping smoking and avoiding tobacco smoke, washing your hands often, and getting a yearly flu shot.  When to call your healthcare provider  Call the healthcare provider if you have any of the following:  · Fever of 100.4°F (38.0°C) or higher, or as advised  · Symptoms that get worse, or new symptoms  · Trouble breathing  · Symptoms that dont start to improve within a week, or within 3 days of taking antibiotics   Date Last Reviewed: 12/1/2016  © 9058-9729  [employer]. [address] [place] [address], [place] [address]. All rights reserved. This information is not intended as a substitute for professional medical care. Always follow your healthcare professional's instructions.

## 2020-12-12 NOTE — LETTER
December 12, 2020  Lindy Sparks  [address] [place] [address]                [facility]  [address]  [place] [address]  Phone: [phone]  Fax: [phone] Lindy Sparks was seen and treated in our Urgent Care department on 12/12/2020. She may return to work in 2 - 3 days.      If you have any questions or concerns, please don't hesitate to call.        Sincerely,        Ferny Jim MD

## 2020-12-12 NOTE — PROGRESS NOTES
"Subjective:       Patient ID: Lindy Sparks is a 70 y.o. female.    Vitals:  height is 5' 7" (1.702 m) and weight is 84.8 kg (187 lb). Her temperature is 96.7 °F (35.9 °C). Her blood pressure is 114/70 and her pulse is 64. Her respiration is 20 and oxygen saturation is 96%.     Chief Complaint: Shortness of Breath (weak, no appetite)    Shortness of Breath  This is a new problem. The current episode started yesterday. The problem has been gradually worsening. The average episode lasts 30 minutes. Pertinent negatives include no abdominal pain, chest pain, ear pain, fever, hemoptysis, sore throat, sputum production, vomiting or wheezing.       Constitution: Positive for appetite change, chills and fatigue. Negative for sweating and fever.   HENT: Negative for ear pain, congestion, sinus pain, sinus pressure, sore throat, trouble swallowing and voice change.    Neck: Negative for painful lymph nodes.   Cardiovascular: Negative for chest pain.   Eyes: Negative for eye redness.   Respiratory: Positive for shortness of breath. Negative for chest tightness, cough, sputum production, bloody sputum, COPD, stridor, wheezing and asthma.    Gastrointestinal: Positive for nausea. Negative for abdominal pain, vomiting, constipation and diarrhea.   Musculoskeletal: Positive for pain and joint pain. Negative for muscle ache.   Allergic/Immunologic: Positive for flu shot. Negative for environmental allergies, seasonal allergies, food allergies and asthma.   Neurological:        Patient having trouble getting out of bed due to weakness and pain in both knees in morning.   Hematologic/Lymphatic: Negative for swollen lymph nodes.       Objective:      Physical Exam   Constitutional: She is oriented to person, place, and time. She appears well-developed. She is cooperative.  Non-toxic appearance. She does not appear ill. No distress.   HENT:   Head: Normocephalic and atraumatic.   Ears:   Right Ear: Hearing, tympanic membrane, external " ear and ear canal normal.   Left Ear: Hearing, tympanic membrane, external ear and ear canal normal.   Nose: No mucosal edema, rhinorrhea or nasal deformity. No epistaxis. Right sinus exhibits no maxillary sinus tenderness and no frontal sinus tenderness. Left sinus exhibits no maxillary sinus tenderness and no frontal sinus tenderness.   Mouth/Throat: Uvula is midline and mucous membranes are normal. No trismus in the jaw. Normal dentition. No uvula swelling. Posterior oropharyngeal edema and posterior oropharyngeal erythema present. No oropharyngeal exudate.   Eyes: Conjunctivae and lids are normal. No scleral icterus.   Neck: Trachea normal, full passive range of motion without pain and phonation normal. Neck supple. No neck rigidity. No edema and no erythema present.   Cardiovascular: Normal rate, regular rhythm, normal heart sounds and normal pulses.   Pulmonary/Chest: Effort normal and breath sounds normal. No respiratory distress. She has no decreased breath sounds. She has no rhonchi.   Abdominal: Normal appearance.   Musculoskeletal: Normal range of motion.         General: No deformity.   Neurological: She is alert and oriented to person, place, and time. She exhibits normal muscle tone. Coordination normal.   Skin: Skin is warm, dry, intact, not diaphoretic and not pale. Psychiatric: Her speech is normal and behavior is normal. Judgment and thought content normal.   Nursing note and vitals reviewed.      Results for orders placed or performed in visit on 12/12/20   POCT COVID-19 Rapid Screening   Result Value Ref Range    POC Rapid COVID Negative Negative     Acceptable Yes            Assessment:       1. SOB (shortness of breath)    2. Pharyngitis, unspecified etiology    3. Bronchitis        Plan:         SOB (shortness of breath)  -     POCT COVID-19 Rapid Screening    Pharyngitis, unspecified etiology    Bronchitis  -     cefdinir (OMNICEF) 300 MG capsule; Take 1 capsule (300 mg total)  by mouth 2 (two) times daily. for 10 days  Dispense: 20 capsule; Refill: 0  -     pseudoephedrine-DM-guaifenesin (POLY-VENT DM) 60- mg Tab; Take 1 tablet by mouth every 6 (six) hours as needed.  Dispense: 20 tablet; Refill: 0  -     predniSONE (DELTASONE) 20 MG tablet; Take 2 tablets (40 mg total) by mouth once daily. for 5 days  Dispense: 10 tablet; Refill: 0     Please drink plenty of fluids.  Please get plenty of rest.  Please return here or go to the Emergency Department for any concerns or worsening of condition.  If you were given wait & see antibiotics, please wait 3-5 days before taking them, and only take them if your symptoms have worsened or not improved.  If you do begin taking the antibiotics, please take them to completion.  If you were prescribed antibiotics, please take them to completion.  If you were prescribed a narcotic medication, do not drive or operate heavy equipment or machinery while taking these medications.    You were given a decongestant (RESCON or POLY VENT Dm).  If your insurance does not cover it or you cannot afford it, it is ok to use the over the counter products listed below.  If you do not have Hypertension or any history of palpitations, it is ok to take over the counter Sudafed or Mucinex D or Allegra-D or Claritin-D or Zyrtec-D.  If you do take one of the above, it is ok to combine that with plain over the counter Mucinex or Allegra or Claritin or Zyrtec.  If for example you are taking Zyrtec -D, you can combine that with Mucinex, but not Mucinex-D.  If you are taking Mucinex-D, you can combine that with plain Allegra or Claritin or Zyrtec.   If you do have Hypertension or palpitations, it is safe to take Coricidin HBP for relief of sinus symptoms.    We recommend you take over the counter Flonase (Fluticasone) or another nasally inhaled steroid unless you are already taking one.  Nasal irrigation with a saline spray or Netti Pot like device per their directions is also  recommended.  If not allergic, please take over the counter Tylenol (Acetaminophen) and/or Motrin (Ibuprofen) as directed for control of pain and/or fever.    Robitussin DM 2 teas every 4 hours as needed for cough.  If you  smoke, please stop smoking.      Please follow up with your primary care doctor or specialist as needed.  Lindy Amor MD  [phone]    You must understand that you have received treatment at an Urgent Care facility only, and that you may be  released before all of your medical problems are known or treated. Urgent Care facilities are not equipped to  handle life threatening emergencies. It is recommended that you seek care at an Emergency Department for  further evaluation of worsening or concerning symptoms, or possibly life threatening conditions as  discussed.

## 2021-02-02 PROBLEM — I49.1 PAC (PREMATURE ATRIAL CONTRACTION): Status: ACTIVE | Noted: 2021-02-02

## 2021-02-02 PROBLEM — R73.01 IFG (IMPAIRED FASTING GLUCOSE): Status: ACTIVE | Noted: 2021-02-02

## 2021-02-02 PROBLEM — I65.23 BILATERAL CAROTID ARTERY STENOSIS: Status: ACTIVE | Noted: 2021-02-02

## 2021-02-02 PROBLEM — I70.0 AORTIC ATHEROSCLEROSIS: Status: ACTIVE | Noted: 2021-02-02

## 2021-04-07 DIAGNOSIS — Z11.59 SPECIAL SCREENING EXAMINATION FOR UNSPECIFIED VIRAL DISEASE: ICD-10-CM

## 2021-04-13 ENCOUNTER — HOSPITAL ENCOUNTER (OUTPATIENT)
Dept: PREADMISSION TESTING | Facility: HOSPITAL | Age: 71
Discharge: HOME | End: 2021-04-13
Attending: NURSE PRACTITIONER
Payer: MEDICAID

## 2021-04-13 DIAGNOSIS — Z11.59 SPECIAL SCREENING EXAMINATION FOR UNSPECIFIED VIRAL DISEASE: ICD-10-CM

## 2021-04-13 PROCEDURE — U0005 INFEC AGEN DETEC AMPLI PROBE: HCPCS | Performed by: NURSE PRACTITIONER

## 2021-04-13 PROCEDURE — U0003 INFECTIOUS AGENT DETECTION BY NUCLEIC ACID (DNA OR RNA); SEVERE ACUTE RESPIRATORY SYNDROME CORONAVIRUS 2 (SARS-COV-2) (CORONAVIRUS DISEASE [COVID-19]), AMPLIFIED PROBE TECHNIQUE, MAKING USE OF HIGH THROUGHPUT TECHNOLOGIES AS DESCRIBED BY CMS-2020-01-R: HCPCS | Performed by: NURSE PRACTITIONER

## 2021-04-14 LAB — SARS-COV-2 RNA RESP QL NAA+PROBE: NOT DETECTED

## 2021-04-15 ENCOUNTER — HOSPITAL ENCOUNTER (OUTPATIENT)
Dept: SLEEP MEDICINE | Facility: HOSPITAL | Age: 71
Discharge: HOME | End: 2021-04-15
Attending: NURSE PRACTITIONER
Payer: MEDICAID

## 2021-04-15 DIAGNOSIS — G47.33 OBSTRUCTIVE SLEEP APNEA (ADULT) (PEDIATRIC): ICD-10-CM

## 2021-04-15 PROCEDURE — 95811 PR POLYSOMNOGRAPHY W/CPAP: ICD-10-PCS | Mod: 26,,, | Performed by: INTERNAL MEDICINE

## 2021-04-15 PROCEDURE — 95811 POLYSOM 6/>YRS CPAP 4/> PARM: CPT

## 2021-04-15 PROCEDURE — 95811 POLYSOM 6/>YRS CPAP 4/> PARM: CPT | Mod: 26,,, | Performed by: INTERNAL MEDICINE

## 2021-05-06 ENCOUNTER — PATIENT MESSAGE (OUTPATIENT)
Dept: RESEARCH | Facility: HOSPITAL | Age: 71
End: 2021-05-06

## 2021-08-03 DIAGNOSIS — U07.1 COVID-19 VIRUS DETECTED: ICD-10-CM

## 2021-08-07 ENCOUNTER — NURSE TRIAGE (OUTPATIENT)
Dept: ADMINISTRATIVE | Facility: CLINIC | Age: 71
End: 2021-08-07

## 2021-08-08 ENCOUNTER — NURSE TRIAGE (OUTPATIENT)
Dept: ADMINISTRATIVE | Facility: CLINIC | Age: 71
End: 2021-08-08

## 2021-08-11 PROBLEM — U07.1 PNEUMONIA DUE TO COVID-19 VIRUS: Status: ACTIVE | Noted: 2021-08-11

## 2021-08-11 PROBLEM — J12.82 PNEUMONIA DUE TO COVID-19 VIRUS: Status: ACTIVE | Noted: 2021-08-11

## 2021-08-11 PROBLEM — R23.8 COVID TOES: Status: ACTIVE | Noted: 2021-08-11

## 2021-08-11 PROBLEM — U07.1 COVID-19 VIRUS INFECTION: Status: RESOLVED | Noted: 2021-08-11 | Resolved: 2021-08-11

## 2021-08-11 PROBLEM — I50.30 (HFPEF) HEART FAILURE WITH PRESERVED EJECTION FRACTION: Status: ACTIVE | Noted: 2021-08-11

## 2021-08-11 PROBLEM — U07.1 COVID TOES: Status: ACTIVE | Noted: 2021-08-11

## 2021-08-11 PROBLEM — R74.01 TRANSAMINITIS: Status: ACTIVE | Noted: 2021-08-11

## 2021-08-11 PROBLEM — U07.1 COVID-19 VIRUS INFECTION: Status: ACTIVE | Noted: 2021-08-11

## 2021-08-19 ENCOUNTER — PATIENT OUTREACH (OUTPATIENT)
Dept: ADMINISTRATIVE | Facility: CLINIC | Age: 71
End: 2021-08-19

## 2021-08-19 ENCOUNTER — PATIENT MESSAGE (OUTPATIENT)
Dept: ADMINISTRATIVE | Facility: CLINIC | Age: 71
End: 2021-08-19

## 2021-10-08 PROBLEM — E53.8 B12 DEFICIENCY: Status: ACTIVE | Noted: 2021-10-08

## 2021-10-08 PROBLEM — Z86.16 HISTORY OF COVID-19: Status: ACTIVE | Noted: 2021-10-08

## 2021-10-08 PROBLEM — R73.02 IMPAIRED GLUCOSE TOLERANCE: Status: ACTIVE | Noted: 2021-10-08

## 2021-11-23 PROBLEM — H25.813 COMBINED FORM OF AGE-RELATED CATARACT, BOTH EYES: Status: ACTIVE | Noted: 2021-11-23

## 2021-11-23 PROBLEM — H47.033 OPTIC NERVE HYPOPLASIA OF BOTH EYES: Status: ACTIVE | Noted: 2021-11-23

## 2021-11-23 PROBLEM — H02.403 PTOSIS OF EYELID, BILATERAL: Status: ACTIVE | Noted: 2021-11-23

## 2022-01-07 PROBLEM — R06.02 SHORTNESS OF BREATH: Status: ACTIVE | Noted: 2020-07-20

## 2022-02-02 PROBLEM — R73.02 IMPAIRED GLUCOSE TOLERANCE: Status: RESOLVED | Noted: 2021-10-08 | Resolved: 2022-02-02

## 2022-02-02 PROBLEM — K43.2 INCISIONAL HERNIA: Status: RESOLVED | Noted: 2018-06-25 | Resolved: 2022-02-02

## 2022-02-02 PROBLEM — M79.604 PAIN IN BOTH LOWER EXTREMITIES: Status: RESOLVED | Noted: 2020-07-20 | Resolved: 2022-02-02

## 2022-02-02 PROBLEM — M79.605 PAIN IN BOTH LOWER EXTREMITIES: Status: RESOLVED | Noted: 2020-07-20 | Resolved: 2022-02-02

## 2022-02-03 PROBLEM — R06.09 DYSPNEA ON EXERTION: Status: ACTIVE | Noted: 2022-02-03

## 2022-02-03 PROBLEM — R06.02 SHORTNESS OF BREATH: Status: RESOLVED | Noted: 2020-07-20 | Resolved: 2022-02-03

## 2022-02-03 PROBLEM — M79.604 PAIN IN BOTH LOWER EXTREMITIES: Status: ACTIVE | Noted: 2022-02-03

## 2022-02-03 PROBLEM — E66.09 CLASS 1 OBESITY DUE TO EXCESS CALORIES WITH SERIOUS COMORBIDITY AND BODY MASS INDEX (BMI) OF 30.0 TO 30.9 IN ADULT: Status: ACTIVE | Noted: 2022-02-03

## 2022-02-03 PROBLEM — I50.30 (HFPEF) HEART FAILURE WITH PRESERVED EJECTION FRACTION: Status: RESOLVED | Noted: 2021-08-11 | Resolved: 2022-02-03

## 2022-02-03 PROBLEM — E66.811 CLASS 1 OBESITY DUE TO EXCESS CALORIES WITH SERIOUS COMORBIDITY AND BODY MASS INDEX (BMI) OF 30.0 TO 30.9 IN ADULT: Status: ACTIVE | Noted: 2022-02-03

## 2022-02-03 PROBLEM — M79.605 PAIN IN BOTH LOWER EXTREMITIES: Status: ACTIVE | Noted: 2022-02-03

## 2022-05-24 PROBLEM — I37.1 NONRHEUMATIC PULMONARY VALVE INSUFFICIENCY: Status: ACTIVE | Noted: 2022-05-24

## 2022-05-25 PROBLEM — I20.89 EQUIVALENT ANGINA: Status: ACTIVE | Noted: 2022-05-25

## 2022-05-25 PROBLEM — Z71.89 CPAP USE COUNSELING: Status: ACTIVE | Noted: 2022-05-25

## 2022-05-25 PROBLEM — I50.30 HEART FAILURE WITH PRESERVED EJECTION FRACTION: Status: ACTIVE | Noted: 2022-05-25

## 2022-11-28 PROBLEM — E66.811 CLASS 1 OBESITY DUE TO EXCESS CALORIES WITH SERIOUS COMORBIDITY AND BODY MASS INDEX (BMI) OF 30.0 TO 30.9 IN ADULT: Status: RESOLVED | Noted: 2022-02-03 | Resolved: 2022-11-28

## 2022-11-28 PROBLEM — Z01.818 PRE-OP EVALUATION: Status: ACTIVE | Noted: 2022-11-28

## 2022-11-28 PROBLEM — R73.02 IMPAIRED GLUCOSE TOLERANCE: Status: RESOLVED | Noted: 2021-10-08 | Resolved: 2022-11-28

## 2022-11-28 PROBLEM — E66.09 CLASS 1 OBESITY DUE TO EXCESS CALORIES WITH SERIOUS COMORBIDITY AND BODY MASS INDEX (BMI) OF 30.0 TO 30.9 IN ADULT: Status: RESOLVED | Noted: 2022-02-03 | Resolved: 2022-11-28

## 2022-12-02 ENCOUNTER — PATIENT MESSAGE (OUTPATIENT)
Dept: RESEARCH | Facility: HOSPITAL | Age: 72
End: 2022-12-02
Payer: MEDICAID

## 2022-12-11 DIAGNOSIS — M17.11 PRIMARY OSTEOARTHRITIS OF RIGHT KNEE: Primary | ICD-10-CM

## 2023-01-08 DIAGNOSIS — U07.1 COVID-19 VIRUS DETECTED: ICD-10-CM

## 2023-03-08 PROBLEM — Z01.818 PRE-OP EVALUATION: Status: RESOLVED | Noted: 2022-11-28 | Resolved: 2023-03-08

## 2023-03-08 PROBLEM — Z96.0 PRESENCE OF PESSARY: Status: ACTIVE | Noted: 2023-03-08

## 2023-03-30 PROBLEM — R00.1 SINUS BRADYCARDIA: Status: RESOLVED | Noted: 2020-09-14 | Resolved: 2023-03-30

## 2023-04-05 PROBLEM — R53.1 GENERALIZED WEAKNESS: Status: ACTIVE | Noted: 2023-04-05

## 2023-04-05 PROBLEM — E53.8 B12 DEFICIENCY: Status: RESOLVED | Noted: 2021-10-08 | Resolved: 2023-04-05

## 2023-04-05 PROBLEM — R27.0 ATAXIA: Status: ACTIVE | Noted: 2023-04-05

## 2023-05-29 PROBLEM — I95.9 HYPOTENSION: Status: ACTIVE | Noted: 2023-05-29

## 2023-06-17 PROBLEM — R41.0 CONFUSION: Status: ACTIVE | Noted: 2023-06-17

## 2023-06-19 PROBLEM — J84.9 ILD (INTERSTITIAL LUNG DISEASE): Status: ACTIVE | Noted: 2023-06-19

## 2023-07-13 ENCOUNTER — CLINICAL SUPPORT (OUTPATIENT)
Dept: REHABILITATION | Facility: HOSPITAL | Age: 73
End: 2023-07-13
Attending: INTERNAL MEDICINE
Payer: MEDICAID

## 2023-07-13 DIAGNOSIS — R60.0 PERIPHERAL EDEMA: ICD-10-CM

## 2023-07-13 DIAGNOSIS — R29.818 FINE MOTOR SKILL LOSS: ICD-10-CM

## 2023-07-13 DIAGNOSIS — R29.898 DECREASED GRIP STRENGTH: ICD-10-CM

## 2023-07-13 DIAGNOSIS — R29.898 FINE MOTOR SKILL LOSS: ICD-10-CM

## 2023-07-13 DIAGNOSIS — M79.641 HAND PAIN, RIGHT: ICD-10-CM

## 2023-07-13 PROCEDURE — 97165 OT EVAL LOW COMPLEX 30 MIN: CPT | Mod: PN

## 2023-07-14 PROBLEM — M79.641 HAND PAIN, RIGHT: Status: ACTIVE | Noted: 2023-07-14

## 2023-07-14 PROBLEM — R29.818 FINE MOTOR SKILL LOSS: Status: ACTIVE | Noted: 2023-07-14

## 2023-07-14 PROBLEM — R29.898 DECREASED GRIP STRENGTH: Status: ACTIVE | Noted: 2023-07-14

## 2023-07-14 PROBLEM — R29.898 FINE MOTOR SKILL LOSS: Status: ACTIVE | Noted: 2023-07-14

## 2023-07-14 NOTE — PLAN OF CARE
"  [facility]  Occupational Therapy Initial Evaluation    Date: 7/13/2023  Name: Lindy Sparks  [facility] Number: [id number]    Therapy Diagnosis:   Encounter Diagnoses   Name Primary?    Peripheral edema     Hand pain, right     Decreased  strength     Fine motor skill loss      Physician: Lindy Amor MD    Physician Orders: OT Evaluate and treat  Medical Diagnosis: Bilateral Carpal Tunnel  Surgical Procedure and Date: NA  Evaluation Date: 7/13/2023  Insurance Authorization Period Expiration: 7/3/2023 - 12/31/2023  Plan of Care Certification Period: 7/13/2023 - 9/12/2023  Progress Note Due: 8/13/2023   Date of Return to MD: Unknown  Visit # / Visits authorized: Evaluation  FOTO: See media section    Precautions:  HTN, Fall    Time In:4:00  Time Out: 4:50  Total Appointment Time (timed & untimed codes): 50 minutes    SUBJECTIVE     Date of Onset: "For a while now."    History of Current Condition/Mechanism of Injury: Lindy reports: Pt does not recall exact time. She reports she has been experiencing carpal tunnel symptoms "for a while now" with symptoms beginning with slight numbness in right hand. Numbness worsened with accompanying pain and weakness. She began having difficulty holding basic household items or opening containers. Reported to MD where she reports she "Was stuck with needles and wires" and was told she has bilateral carpal tunnel. She does endorse pain, weakness, numbness, and reduced function with right hand but states she is currently having no problems with her left hand. Additionally, Pt reports she is constantly fatigued and feeling sleepy which has been her baseline for some time now per Pt report.     Falls: Yes. Reports 5-6 falls per week. Uses RW and cane.     Involved Side: Bilateral   Dominant Side: Right  Imaging: none   Prior Therapy: None  Occupation:  Unemployed    Functional Limitations/Social History:    Previous functional status includes: Independent " "with all ADLs.     Current Functional Status   Home/Living environment: lives alone however daughter comes to her home daily for assistance (per Pt)      Limitation of Functional Status as follows:   ADLs/IADLs: with use of RW and w/c and increased time.    - Feeding: MOD I    - Bathing: MOD I    - Dressing/Grooming: MOD I    - Driving: Dependent.    Pain:  Functional Pain Scale Rating 0-10: Current 0/10, worst 10/10, best 0/10   Location: [place] eminence to radiating to thumb and directly above carpal tunnel.   Description: Sharp and stabbing pain at carpal tunnel  Aggravating Factors: Lifting, reaching, washing dishes, opening jars, opening coke cans.   Easing Factors: pain medication and rest    Patient's Goals for Therapy: "I want to be able to do more work again with my hand."    Medical History:   Past Medical History:   Diagnosis Date    A-fib     Acid reflux     Anxiety     Arthritis     Back pain     low back    Cerebrovascular accident (CVA) due to embolism of left posterior cerebral artery 2019    Coronary artery disease     Depression     Ectopic pregnancy     History of psychiatric hospitalization     Hx of psychiatric care     Hypertension     Incarcerated hernia     Migraine     Neuropathy     SHAW (obstructive sleep apnea)     Pelvic mass in female 10/8/2017    Psychiatric problem     PTSD (post-traumatic stress disorder)     Restrictive lung disease     Right hip pain     Suicide attempt     Therapy        Surgical History:    has a past surgical history that includes Tonsillectomy; Cholecystectomy; Colonoscopy;  section; Ectopic pregnancy surgery; Oophorectomy; Cardiac catheterization; Umbilical hernia repair (N/A, 2018); Hysterectomy (10/30/2017); Esophagogastroduodenoscopy (N/A, 09/15/2022); Colonoscopy (N/A, 09/15/2022); Cataract extraction w/  intraocular lens implant (Left, 2022); TONSILLECTOMY, ADENOIDECTOMY; and Cataract extraction w/ intraocular lens implant (Right, " 12/20/2022).    Medications:   has a current medication list which includes the following prescription(s): albuterol, albuterol, aspirin, atorvastatin, budesonide, calcitriol, clotrimazole, docusate sodium, escitalopram oxalate, furosemide, hydrocodone-acetaminophen, losartan, metoprolol succinate, naloxone, omeprazole, prednisone, premarin, rivaroxaban, and sulfamethoxazole-trimethoprim 800-160mg.    Allergies:   Review of patient's allergies indicates:   Allergen Reactions    Norvasc [amlodipine] Swelling    Buspar [buspirone] Other (See Comments)     Suicidal thoughts    Fluoxetine Other (See Comments)     Suicidal thoughts    Lisinopril Other (See Comments)     Unsure of reaction    Naproxen Other (See Comments)     Weakness, dizziness, chest pain    Pravastatin sodium      Chest pain  And headache          OBJECTIVE       Special test  [place]:  Right:Pos  Left:Neg    Active Range of Motion:  (Measured in degrees)   Right Left   Elbow Flexion WNL WNL   Elbow Extension WNL WNL   Elbow Supination WNL WNL   Elbow Pronation WNL WNL   Wrist Extension  50 50   Wrist Flexion 40 40   Radial Deviation 10 10   Ulnar Deviation 30          50     Range of Motion Hand  (Active/Passive)  Measured in degrees.  Pt able to demonstrate full range of motion in digits in bilateral hands.    Manual Muscle Test   Right Left   Elbow Flexion 5/5 5/5   Elbow Extension 5/5 4+/5   Elbow Supination 5/5 5/5   Elbow Pronation 5/5 5/5   Wrist Extension  -4/5 -4/5   Wrist Flexion -4/5 -4/5   Radial Deviation -4/5 -4/5   Ulnar Deviation -4/5 -4/5      Strength (Dynamometer/Measured in pounds on Rung II) and Pinch Strength (Pinch Gauge)   7/14/2023 7/14/2023    Right Left   Composite 0 8   Lateral Pinch 2 2   Tripod Pinch 1 1     Opposition (Fine Motor Skills/Dexterity): Reduced speed    Sensation: Pt able to consistently identify 4.31 filament on volar and dorsal aspects of bilateral hands.      Edema. Measured in centimeters.   7/14/2023  7/14/2023    Right Left   Wrist Crease 17 17.3   MCPs 20 20   Some edema bilateral metacarpal phalangeal edema noted.    Limitation/Restriction for FOTO  Survey    Therapist reviewed FOTO scores for Lindy Sparks on 7/13/2023.   FOTO documents entered into [employer] - see Media section.    Limitation Score: See media section         Treatment   Total Treatment time (time-based codes) separate from Evaluation: 10 minutes    Lindy received the treatments listed below:     Therapeutic exercises to develop strength and ROM for 10 minutes, including:  - Digit opposition: 2 x 10  - Tabletop digit lifts: 2 x 10  - Tabletop abduction/adduction: 2 x 10    Patient Education and Home Exercises      Education provided:     Written Home Exercises Provided: yes.  Exercises were reviewed and Lindy was able to demonstrate them prior to the end of the session.  Lindy demonstrated fair  understanding of the education provided. See EMR under Patient Instructions for exercises provided during therapy sessions.     Pt was advised to perform these exercises free of pain, and to stop performing them if pain occurs.    Patient/Family Education: role of OT, goals for OT, scheduling/cancellations - pt verbalized understanding. Discussed insurance limitations with patient.      ASSESSMENT     Lindy Sparks is a 72 y.o. female referred to outpatient occupational therapy and presents with a medical diagnosis of bilateral carpal tunnel. Patient presents with the following therapy deficits: Decreased  strength, Decreased pinch strength, Decreased muscle strength, Decreased functional hand use, Increased pain, and Edema and demonstrates limitations as described in the chart below. Following medical record review it is determined that pt will benefit from occupational therapy services in order to maximize pain free and/or functional use of right hand. The following goals were discussed with the patient and patient is in agreement with them as to be  addressed in the treatment plan. The patient's rehab potential is Good.     Anticipated barriers to occupational therapy: Pt dependent on family for transport.   Pt has no cultural, educational or language barriers to learning provided.    Profile and History Assessment of Occupational Performance Level of Clinical Decision Making Complexity Score   Occupational Profile:   Lindy Sparks is a 72 y.o. female who lives alone and is on disability Lindy Sparks has difficulty with  ADLs and IADLs as listed previously, which  Affecting herdaily functional abilities.      Comorbidities:    has a past medical history of A-fib, Acid reflux, Anxiety, Arthritis, Back pain, Cerebrovascular accident (CVA) due to embolism of left posterior cerebral artery, Coronary artery disease, Depression, Ectopic pregnancy, History of psychiatric hospitalization, psychiatric care, Hypertension, Incarcerated hernia, Migraine, Neuropathy, SHAW (obstructive sleep apnea), Pelvic mass in female, Psychiatric problem, PTSD (post-traumatic stress disorder), Restrictive lung disease, Right hip pain, Suicide attempt, and Therapy.    Medical and Therapy History Review:   Brief               Performance Deficits    Physical:  Muscle Power/Strength  Edema   Strength  Pinch Strength  Pain    Cognitive:  No Deficits    Psychosocial:    Habits  Routines     Clinical Decision Making:  low    Assessment Process:  Problem-Focused Assessments    Modification/Need for Assistance:  Not Necessary    Intervention Selection:  Limited Treatment Options       low  Based on PMHX, co morbidities , data from assessments and functional level of assistance required with task and clinical presentation directly impacting function.       The following goals were discussed with the patient and patient is in agreement with them as to be addressed in the treatment plan.       Goals:   The following goals were discussed with the patient and patient is in agreement with them as  to be addressed in the treatment plan.     Short Term Goals (STGs); to be met within 4 weeks.  STG #1: Pt will report 8 out of 10 pain level with with use of right hand.  STG #2: Pt will demonstrate increased  strength of 10#s in order to increase fine motor skills and dexterity.  STG #3: Pt will complete the 9 peg strength test in order to improve fine motor dexterity.  STG #4: Pt will demonstrate independence with issued HEP.   STG #5: Pt will demonstrate increased  pinch strength by 5# in order to increase fine motor skills and dexterity.   STG #6: Pt will demonstrate increased wrist strength to grade 4+/5 in all movements for improved functional use of hand during daily activities.     Long Term Goals (LTGs); to be met by discharge.  LTG #1: Pt will report a pain level of 5 out of 10 with use of right hand.   LTG #2: Pt will demo improved FOTO score to predicted level.   LTG #3: Pt will demonstrate increased  strength of 20#s in order to increase fine motor skills and dexterity.  STG #4: Pt will demonstrate increased  pinch strength by 8# in order to increase fine motor skills and dexterity.   STG #5: Pt will demonstrate increased wrist strength to grade 5/5 in all movements for improved functional use of hand during daily activities.     PLAN   Plan of Care Certification: 7/13/2023 to 9/13/2023.     Outpatient Occupational Therapy 1- 2 times weekly for 8 weeks to include the following interventions: Paraffin, Fluidotherapy, Manual therapy/joint mobilizations, Modalities for pain management, US 3 mhz, Therapeutic exercises/activities., Strengthening, Orthotic Fabrication/Fit/Training, and Edema Control.      Jv Anthony OT      I CERTIFY THE NEED FOR THESE SERVICES FURNISHED UNDER THIS PLAN OF TREATMENT AND WHILE UNDER MY CARE  Physician's comments:      Physician's Signature: ___________________________________________________

## 2023-08-09 PROBLEM — J96.01 ACUTE RESPIRATORY FAILURE WITH HYPOXIA: Status: ACTIVE | Noted: 2023-08-09

## 2023-08-11 PROBLEM — J96.01 ACUTE RESPIRATORY FAILURE WITH HYPOXIA: Status: RESOLVED | Noted: 2023-08-09 | Resolved: 2023-08-11

## 2023-08-11 PROBLEM — N17.9 AKI (ACUTE KIDNEY INJURY): Status: ACTIVE | Noted: 2023-08-11

## 2023-08-18 PROBLEM — I48.91 ATRIAL FIBRILLATION AND FLUTTER: Status: ACTIVE | Noted: 2023-08-18

## 2023-08-18 PROBLEM — I48.92 ATRIAL FIBRILLATION AND FLUTTER: Status: ACTIVE | Noted: 2023-08-18

## 2023-08-18 PROBLEM — Z78.9 STATIN INTOLERANCE: Status: ACTIVE | Noted: 2023-08-18

## 2023-08-22 ENCOUNTER — TELEPHONE (OUTPATIENT)
Dept: ENDOSCOPY | Facility: HOSPITAL | Age: 73
End: 2023-08-22
Payer: MEDICAID

## 2023-08-22 PROBLEM — I51.7 RIGHT ATRIAL ENLARGEMENT: Status: ACTIVE | Noted: 2023-08-22

## 2023-08-22 PROBLEM — R47.9 SPEECH DEFECT: Status: ACTIVE | Noted: 2023-08-22

## 2023-08-22 PROBLEM — I51.7 LEFT ATRIAL ENLARGEMENT: Status: ACTIVE | Noted: 2023-08-22

## 2023-08-22 PROBLEM — I42.9 CARDIOMYOPATHY: Status: ACTIVE | Noted: 2023-08-22

## 2023-08-22 NOTE — TELEPHONE ENCOUNTER
Patient called to schedule an endoscopy procedure(s), the call was disconnected. Attempted to reach the Pt via telephone call, the patient did not answer the call and left a voice message requesting a call back.

## 2023-08-22 NOTE — TELEPHONE ENCOUNTER
Hello,     Patient's daughter, Karlee, is requesting to schedule for patient's esophageal manometry. Please place an order if one is needed.     Thank you,   Lindy

## 2023-08-22 NOTE — TELEPHONE ENCOUNTER
Patient's daughter, Karlee, called to schedule for patient's esophageal manometry. No referral or case request in chart. Will reach out to provider. Ms. Desir verbalized an understanding.

## 2023-08-30 ENCOUNTER — TELEPHONE (OUTPATIENT)
Dept: ENDOSCOPY | Facility: HOSPITAL | Age: 73
End: 2023-08-30
Payer: MEDICAID

## 2023-08-30 NOTE — TELEPHONE ENCOUNTER
Contacted the patient to schedule an endoscopy procedure(s) Esophageal Manometry. The patient did not answer the call and left a voice message requesting a call back.

## 2023-08-31 ENCOUNTER — TELEPHONE (OUTPATIENT)
Dept: ENDOSCOPY | Facility: HOSPITAL | Age: 73
End: 2023-08-31
Payer: MEDICAID

## 2023-08-31 VITALS — WEIGHT: 199 LBS | HEIGHT: 67 IN | BODY MASS INDEX: 31.23 KG/M2

## 2023-08-31 DIAGNOSIS — R13.10 DYSPHAGIA, UNSPECIFIED TYPE: Primary | ICD-10-CM

## 2023-08-31 NOTE — TELEPHONE ENCOUNTER
Spoke to patient's daughter to schedule procedure(s) Esophageal Manometry       Physician to perform procedure(s) Dr. CHRIS Hodgson  Date of Procedure (s) 9/26/23  Arrival Time 7:00 AM  Time of Procedure(s) 8:00 AM   Location of Procedure(s) [address]  Type of Rx Prep sent to patient: N/A  Instructions provided to patient via Email and [facility]    Patient was informed on the following information and verbalized understanding. Screening questionnaire reviewed with patient and complete. If procedure requires anesthesia, a responsible adult needs to be present to accompany the patient home, patient cannot drive after receiving anesthesia. Appointment details are tentative, especially check-in time. Patient will receive a prep-op call 4 days prior to confirm check-in time for procedure. If applicable the patient should contact their pharmacy to verify Rx for procedure prep is ready for pick-up. Patient was advised to call the scheduling department at [phone] if pharmacy states no Rx is available. Patient was advised to call the endoscopy scheduling department if any questions or concerns arise.      [facility] Scheduling Department

## 2023-09-15 PROBLEM — N12 PYELONEPHRITIS: Status: ACTIVE | Noted: 2023-09-15

## 2023-09-16 PROBLEM — I50.33 ACUTE ON CHRONIC HEART FAILURE WITH PRESERVED EJECTION FRACTION (HFPEF): Status: ACTIVE | Noted: 2023-09-16

## 2023-09-26 ENCOUNTER — TELEPHONE (OUTPATIENT)
Dept: ENDOSCOPY | Facility: HOSPITAL | Age: 73
End: 2023-09-26
Payer: MEDICAID

## 2023-09-26 ENCOUNTER — PATIENT MESSAGE (OUTPATIENT)
Dept: ENDOSCOPY | Facility: HOSPITAL | Age: 73
End: 2023-09-26
Payer: MEDICAID

## 2023-09-26 VITALS — WEIGHT: 200 LBS | BODY MASS INDEX: 31.39 KG/M2 | HEIGHT: 67 IN

## 2023-09-26 PROBLEM — Z91.81 AT RISK FOR FALLS: Status: ACTIVE | Noted: 2023-09-26

## 2023-09-26 PROBLEM — Z74.09 IMPAIRED FUNCTIONAL MOBILITY, BALANCE, GAIT, AND ENDURANCE: Status: ACTIVE | Noted: 2023-09-26

## 2023-09-26 NOTE — TELEPHONE ENCOUNTER
----- Message from Keena Snyder sent at 9/26/2023  8:28 AM CDT -----  Regarding: FW: reschedule ENDO procedure    ----- Message -----  From: Jesús Cat MA  Sent: 9/26/2023   8:26 AM CDT  To: [facility] Schedulers  Subject: reschedule ENDO procedure                        Patient is scheduled for ENDO procedure on 9/26/23 with  but would like to reschedule procedure to a different day and time

## 2023-09-26 NOTE — TELEPHONE ENCOUNTER
Spoke to Karlee pt daughter to schedule procedure(s) Esophageal Manometry       Physician to perform procedure(s) Dr. KAMRAN Escalona  Date of Procedure (s) 10/12/23  Arrival Time 9:00 AM  Time of Procedure(s) 10:00 AM   Location of Procedure(s) [place] [address]  Type of Rx Prep sent to patient: Other  Instructions provided to patient via [facility]    Patient was informed on the following information and verbalized understanding. Screening questionnaire reviewed with patient and complete. If procedure requires anesthesia, a responsible adult needs to be present to accompany the patient home, patient cannot drive after receiving anesthesia. Appointment details are tentative, especially check-in time. Patient will receive a prep-op call 4 days prior to confirm check-in time for procedure. If applicable the patient should contact their pharmacy to verify Rx for procedure prep is ready for pick-up. Patient was advised to call the scheduling department at [phone] if pharmacy states no Rx is available. Patient was advised to call the endoscopy scheduling department if any questions or concerns arise.      [facility] Scheduling Department

## 2023-09-26 NOTE — TELEPHONE ENCOUNTER
Contacted the patient to reschedule an endoscopy procedure(s) esophageal manometry. The patient did not answer the call and left a voice message requesting a call back.

## 2023-09-27 DIAGNOSIS — R49.0 DYSPHONIA: Primary | ICD-10-CM

## 2023-10-05 PROBLEM — R49.0 DYSPHONIA: Status: ACTIVE | Noted: 2023-08-22

## 2023-10-07 NOTE — NURSING TRANSFER
Nursing Transfer Note      10/30/2017     Transfer To: 855    Transfer via stretcher    Transfer with ivf    Transported by pct    Medicines sent: ivf    Chart send with patient: Yes    Notified: daughter called with room number    Patient reassessed at: 10/30/17   Yes

## 2023-10-12 ENCOUNTER — PATIENT MESSAGE (OUTPATIENT)
Dept: ENDOSCOPY | Facility: HOSPITAL | Age: 73
End: 2023-10-12
Payer: MEDICAID

## 2023-10-12 ENCOUNTER — HOSPITAL ENCOUNTER (OUTPATIENT)
Facility: HOSPITAL | Age: 73
Discharge: HOME | End: 2023-10-12
Attending: STUDENT IN AN ORGANIZED HEALTH CARE EDUCATION/TRAINING PROGRAM | Admitting: PHYSICIAN ASSISTANT
Payer: MEDICAID

## 2023-10-12 DIAGNOSIS — R13.10 DYSPHAGIA: ICD-10-CM

## 2023-10-12 PROCEDURE — 91010 PR ESOPHAGEAL MOTILITY STUDY, MA2METRY: ICD-10-PCS | Mod: 26,,, | Performed by: STUDENT IN AN ORGANIZED HEALTH CARE EDUCATION/TRAINING PROGRAM

## 2023-10-12 PROCEDURE — 91010 ESOPHAGUS MOTILITY STUDY: CPT | Mod: 26,,, | Performed by: STUDENT IN AN ORGANIZED HEALTH CARE EDUCATION/TRAINING PROGRAM

## 2023-10-12 PROCEDURE — 25000003 PHARM REV CODE 250: Performed by: STUDENT IN AN ORGANIZED HEALTH CARE EDUCATION/TRAINING PROGRAM

## 2023-10-12 PROCEDURE — 91010 ESOPHAGUS MOTILITY STUDY: CPT | Mod: TC | Performed by: STUDENT IN AN ORGANIZED HEALTH CARE EDUCATION/TRAINING PROGRAM

## 2023-10-12 RX ORDER — LIDOCAINE HYDROCHLORIDE 20 MG/ML
JELLY TOPICAL ONCE
Status: COMPLETED | OUTPATIENT
Start: 2023-10-12 | End: 2023-10-12

## 2023-10-12 RX ADMIN — LIDOCAINE HYDROCHLORIDE 10 ML: 20 JELLY TOPICAL at 11:10

## 2023-10-12 NOTE — OR NURSING
Esophageal manometry completed. Encouragement provided throughout procedure to focus on deliberate single swallows without rushing to avoid coughing and choking. Able to comply in both upright and reclined positions. Unable to perform multiple rapid swallow sequence (1 attempt). Tolerated applesauce swallows x2. Had difficulty with khalida cracker swallow (1 attempt). Unable to properly perform rapid drink challenge (coughing).Discharge instructions reviewed verbally with understanding noted. No pain at time of discharge.

## 2023-10-13 NOTE — [employer] PATIENT INSTRUCTIONS
Discharge Summary/Instructions after an Endoscopic Procedure  Patient Name: Lindy Sparks  Patient MRN: [id number]  Patient YOB: 1950  Friday, October 13, 2023  Emilee Hodgson MD  Dear patient,  As a result of recent federal legislation (The Federal Cures Act), you may   receive lab or pathology results from your procedure in your [facility]   account before your physician is able to contact you. Your physician or   their representative will relay the results to you with their   recommendations at their soonest availability.  Thank you,  RESTRICTIONS:  During your procedure today, you received medications for sedation.  These   medications may affect your judgment, balance and coordination.  Therefore,   for 24 hours, you have the following restrictions:   - DO NOT drive a car, operate machinery, make legal/financial decisions,   sign important papers or drink alcohol.    ACTIVITY:  Today: no heavy lifting, straining or running due to procedural   sedation/anesthesia.  The following day: return to full activity including work.  DIET:  Eat and drink normally unless instructed otherwise.     TREATMENT FOR COMMON SIDE EFFECTS:  - Mild abdominal pain, nausea, belching, bloating or excessive gas:  rest,   eat lightly and use a heating pad.  - Sore Throat: treat with throat lozenges and/or gargle with warm salt   water.  - Because air was used during the procedure, expelling large amounts of air   from your rectum or belching is normal.  - If a bowel prep was taken, you may not have a bowel movement for 1-3 days.    This is normal.  SYMPTOMS TO WATCH FOR AND REPORT TO YOUR PHYSICIAN:  1. Abdominal pain or bloating, other than gas cramps.  2. Chest pain.  3. Back pain.  4. Signs of infection such as: chills or fever occurring within 24 hours   after the procedure.  5. Rectal bleeding, which would show as bright red, maroon, or black stools.   (A tablespoon of blood from the rectum is not serious, especially if    hemorrhoids are present.)  6. Vomiting.  7. Weakness or dizziness.  GO DIRECTLY TO THE NEAREST EMERGENCY ROOM IF YOU HAVE ANY OF THE FOLLOWING:      Difficulty breathing              Chills and/or fever over 101 F   Persistent vomiting and/or vomiting blood   Severe abdominal pain   Severe chest pain   Black, tarry stools   Bleeding- more than one tablespoon   Any other symptom or condition that you feel may need urgent attention  Your doctor recommends these additional instructions:  If any biopsies were taken, your doctors clinic will contact you in 1 to 2   weeks with any results.  - Return to referring physician as previously scheduled.   - Continue present medications.   - Consider EGD with esophageal biopsies to rule out EoE   - Consider timed barium esophagram with 13mm tablet and if tablet is   delayed, refer for EGD with [facility]  For questions, problems or results please call your physician - Emilee Hodgson MD at Work:  [phone].  [facility] [place], EMERGENCY ROOM PHONE NUMBER: [phone]  IF A COMPLICATION OR EMERGENCY SITUATION ARISES AND YOU ARE UNABLE TO REACH   YOUR PHYSICIAN - GO DIRECTLY TO THE EMERGENCY ROOM.  Emilee Hodgson MD  10/13/2023 9:19:02 AM  This report has been verified and signed electronically.  Dear patient,  As a result of recent federal legislation (The Federal Cures Act), you may   receive lab or pathology results from your procedure in your [facility]   account before your physician is able to contact you. Your physician or   their representative will relay the results to you with their   recommendations at their soonest availability.  Thank you,  [employer]

## 2023-11-02 PROBLEM — M70.50 ANSERINE BURSITIS: Status: RESOLVED | Noted: 2018-09-26 | Resolved: 2023-11-02

## 2023-11-02 PROBLEM — Z71.89 CPAP USE COUNSELING: Status: RESOLVED | Noted: 2022-05-25 | Resolved: 2023-11-02

## 2023-11-02 PROBLEM — I95.9 HYPOTENSION: Status: RESOLVED | Noted: 2023-05-29 | Resolved: 2023-11-02

## 2023-11-02 PROBLEM — R93.89 ABNORMAL CHEST CT: Status: RESOLVED | Noted: 2017-08-23 | Resolved: 2023-11-02

## 2023-11-02 PROBLEM — R74.01 TRANSAMINITIS: Status: RESOLVED | Noted: 2021-08-11 | Resolved: 2023-11-02

## 2023-11-02 PROBLEM — R00.2 PALPITATIONS: Status: RESOLVED | Noted: 2020-09-14 | Resolved: 2023-11-02

## 2023-11-02 PROBLEM — M79.604 PAIN IN BOTH LOWER EXTREMITIES: Status: RESOLVED | Noted: 2022-02-03 | Resolved: 2023-11-02

## 2023-11-02 PROBLEM — R93.89 ABNORMAL CXR: Status: RESOLVED | Noted: 2020-10-29 | Resolved: 2023-11-02

## 2023-11-02 PROBLEM — R07.9 CHEST PAIN: Status: RESOLVED | Noted: 2020-07-20 | Resolved: 2023-11-02

## 2023-11-02 PROBLEM — R06.09 DYSPNEA ON EXERTION: Status: RESOLVED | Noted: 2022-02-03 | Resolved: 2023-11-02

## 2023-11-02 PROBLEM — N12 PYELONEPHRITIS: Status: RESOLVED | Noted: 2023-09-15 | Resolved: 2023-11-02

## 2023-11-02 PROBLEM — R41.0 CONFUSION: Status: RESOLVED | Noted: 2023-06-17 | Resolved: 2023-11-02

## 2023-11-02 PROBLEM — J12.82 PNEUMONIA DUE TO COVID-19 VIRUS: Status: RESOLVED | Noted: 2021-08-11 | Resolved: 2023-11-02

## 2023-11-02 PROBLEM — R53.1 GENERALIZED WEAKNESS: Status: RESOLVED | Noted: 2023-04-05 | Resolved: 2023-11-02

## 2023-11-02 PROBLEM — N17.9 AKI (ACUTE KIDNEY INJURY): Status: RESOLVED | Noted: 2023-08-11 | Resolved: 2023-11-02

## 2023-11-02 PROBLEM — I50.33 ACUTE ON CHRONIC HEART FAILURE WITH PRESERVED EJECTION FRACTION (HFPEF): Status: RESOLVED | Noted: 2023-09-16 | Resolved: 2023-11-02

## 2023-11-02 PROBLEM — M79.605 PAIN IN BOTH LOWER EXTREMITIES: Status: RESOLVED | Noted: 2022-02-03 | Resolved: 2023-11-02

## 2023-11-02 PROBLEM — N20.0 KIDNEY STONES: Status: RESOLVED | Noted: 2017-10-19 | Resolved: 2023-11-02

## 2023-11-02 PROBLEM — U07.1 PNEUMONIA DUE TO COVID-19 VIRUS: Status: RESOLVED | Noted: 2021-08-11 | Resolved: 2023-11-02

## 2023-11-02 PROBLEM — R29.818 FINE MOTOR SKILL LOSS: Status: RESOLVED | Noted: 2023-07-14 | Resolved: 2023-11-02

## 2023-11-02 PROBLEM — R55 NEAR SYNCOPE: Status: RESOLVED | Noted: 2020-07-20 | Resolved: 2023-11-02

## 2023-11-02 PROBLEM — R23.8 COVID TOES: Status: RESOLVED | Noted: 2021-08-11 | Resolved: 2023-11-02

## 2023-11-02 PROBLEM — R29.898 FINE MOTOR SKILL LOSS: Status: RESOLVED | Noted: 2023-07-14 | Resolved: 2023-11-02

## 2023-11-02 PROBLEM — M79.641 HAND PAIN, RIGHT: Status: RESOLVED | Noted: 2023-07-14 | Resolved: 2023-11-02

## 2023-11-02 PROBLEM — M54.9 BACK PAIN: Status: RESOLVED | Noted: 2018-05-21 | Resolved: 2023-11-02

## 2023-11-02 PROBLEM — U07.1 COVID TOES: Status: RESOLVED | Noted: 2021-08-11 | Resolved: 2023-11-02

## 2023-11-13 ENCOUNTER — OFFICE VISIT (OUTPATIENT)
Dept: CARDIOLOGY | Facility: CLINIC | Age: 73
End: 2023-11-13
Payer: MEDICAID

## 2023-11-13 VITALS
HEART RATE: 87 BPM | OXYGEN SATURATION: 98 % | BODY MASS INDEX: 31.7 KG/M2 | RESPIRATION RATE: 18 BRPM | SYSTOLIC BLOOD PRESSURE: 130 MMHG | DIASTOLIC BLOOD PRESSURE: 70 MMHG | WEIGHT: 201.94 LBS | HEIGHT: 67 IN

## 2023-11-13 DIAGNOSIS — I70.0 AORTIC ATHEROSCLEROSIS: ICD-10-CM

## 2023-11-13 DIAGNOSIS — I25.10 NON-OCCLUSIVE CORONARY ARTERY DISEASE: ICD-10-CM

## 2023-11-13 DIAGNOSIS — I48.91 ATRIAL FIBRILLATION AND FLUTTER: ICD-10-CM

## 2023-11-13 DIAGNOSIS — I48.92 ATRIAL FIBRILLATION AND FLUTTER: ICD-10-CM

## 2023-11-13 DIAGNOSIS — I48.0 PAROXYSMAL ATRIAL FIBRILLATION: Primary | ICD-10-CM

## 2023-11-13 DIAGNOSIS — I50.30 HEART FAILURE WITH PRESERVED EJECTION FRACTION, UNSPECIFIED HF CHRONICITY: ICD-10-CM

## 2023-11-13 DIAGNOSIS — I10 ESSENTIAL HYPERTENSION: Chronic | ICD-10-CM

## 2023-11-13 PROCEDURE — 1101F PT FALLS ASSESS-DOCD LE1/YR: CPT | Mod: CPTII,,, | Performed by: INTERNAL MEDICINE

## 2023-11-13 PROCEDURE — 1157F ADVNC CARE PLAN IN RCRD: CPT | Mod: CPTII,,, | Performed by: INTERNAL MEDICINE

## 2023-11-13 PROCEDURE — 3075F SYST BP GE 130 - 139MM HG: CPT | Mod: CPTII,,, | Performed by: INTERNAL MEDICINE

## 2023-11-13 PROCEDURE — 4010F ACE/ARB THERAPY RXD/TAKEN: CPT | Mod: CPTII,,, | Performed by: INTERNAL MEDICINE

## 2023-11-13 PROCEDURE — 3288F FALL RISK ASSESSMENT DOCD: CPT | Mod: CPTII,,, | Performed by: INTERNAL MEDICINE

## 2023-11-13 PROCEDURE — 3008F BODY MASS INDEX DOCD: CPT | Mod: CPTII,,, | Performed by: INTERNAL MEDICINE

## 2023-11-13 PROCEDURE — 3288F PR FALLS RISK ASSESSMENT DOCUMENTED: ICD-10-PCS | Mod: CPTII,,, | Performed by: INTERNAL MEDICINE

## 2023-11-13 PROCEDURE — 3044F HG A1C LEVEL LT 7.0%: CPT | Mod: CPTII,,, | Performed by: INTERNAL MEDICINE

## 2023-11-13 PROCEDURE — 99999 PR PBB SHADOW E&M-EST. PATIENT-LVL IV: ICD-10-PCS | Mod: PBBFAC,,, | Performed by: INTERNAL MEDICINE

## 2023-11-13 PROCEDURE — 1126F AMNT PAIN NOTED NONE PRSNT: CPT | Mod: CPTII,,, | Performed by: INTERNAL MEDICINE

## 2023-11-13 PROCEDURE — 99204 OFFICE O/P NEW MOD 45 MIN: CPT | Mod: S$PBB,,, | Performed by: INTERNAL MEDICINE

## 2023-11-13 PROCEDURE — 3075F PR MOST RECENT SYSTOLIC BLOOD PRESS GE 130-139MM HG: ICD-10-PCS | Mod: CPTII,,, | Performed by: INTERNAL MEDICINE

## 2023-11-13 PROCEDURE — 4010F PR ACE/ARB THEARPY RXD/TAKEN: ICD-10-PCS | Mod: CPTII,,, | Performed by: INTERNAL MEDICINE

## 2023-11-13 PROCEDURE — 1157F PR ADVANCE CARE PLAN OR EQUIV PRESENT IN MEDICAL RECORD: ICD-10-PCS | Mod: CPTII,,, | Performed by: INTERNAL MEDICINE

## 2023-11-13 PROCEDURE — 99214 OFFICE O/P EST MOD 30 MIN: CPT | Mod: PBBFAC | Performed by: INTERNAL MEDICINE

## 2023-11-13 PROCEDURE — 3078F PR MOST RECENT DIASTOLIC BLOOD PRESSURE < 80 MM HG: ICD-10-PCS | Mod: CPTII,,, | Performed by: INTERNAL MEDICINE

## 2023-11-13 PROCEDURE — 1159F MED LIST DOCD IN RCRD: CPT | Mod: CPTII,,, | Performed by: INTERNAL MEDICINE

## 2023-11-13 PROCEDURE — 99999 PR PBB SHADOW E&M-EST. PATIENT-LVL IV: CPT | Mod: PBBFAC,,, | Performed by: INTERNAL MEDICINE

## 2023-11-13 PROCEDURE — 3078F DIAST BP <80 MM HG: CPT | Mod: CPTII,,, | Performed by: INTERNAL MEDICINE

## 2023-11-13 PROCEDURE — 3044F PR MOST RECENT HEMOGLOBIN A1C LEVEL <7.0%: ICD-10-PCS | Mod: CPTII,,, | Performed by: INTERNAL MEDICINE

## 2023-11-13 PROCEDURE — 1159F PR MEDICATION LIST DOCUMENTED IN MEDICAL RECORD: ICD-10-PCS | Mod: CPTII,,, | Performed by: INTERNAL MEDICINE

## 2023-11-13 PROCEDURE — 1126F PR PAIN SEVERITY QUANTIFIED, NO PAIN PRESENT: ICD-10-PCS | Mod: CPTII,,, | Performed by: INTERNAL MEDICINE

## 2023-11-13 PROCEDURE — 1160F RVW MEDS BY RX/DR IN RCRD: CPT | Mod: CPTII,,, | Performed by: INTERNAL MEDICINE

## 2023-11-13 PROCEDURE — 99204 PR OFFICE/OUTPT VISIT, NEW, LEVL IV, 45-59 MIN: ICD-10-PCS | Mod: S$PBB,,, | Performed by: INTERNAL MEDICINE

## 2023-11-13 PROCEDURE — 3008F PR BODY MASS INDEX (BMI) DOCUMENTED: ICD-10-PCS | Mod: CPTII,,, | Performed by: INTERNAL MEDICINE

## 2023-11-13 PROCEDURE — 1160F PR REVIEW ALL MEDS BY PRESCRIBER/CLIN PHARMACIST DOCUMENTED: ICD-10-PCS | Mod: CPTII,,, | Performed by: INTERNAL MEDICINE

## 2023-11-13 PROCEDURE — 1101F PR PT FALLS ASSESS DOC 0-1 FALLS W/OUT INJ PAST YR: ICD-10-PCS | Mod: CPTII,,, | Performed by: INTERNAL MEDICINE

## 2023-11-13 RX ORDER — FLECAINIDE ACETATE 100 MG/1
100 TABLET ORAL EVERY 12 HOURS
Qty: 180 TABLET | Refills: 3 | Status: SHIPPED | OUTPATIENT
Start: 2023-11-13 | End: 2023-11-28

## 2023-11-13 RX ORDER — AMIODARONE HYDROCHLORIDE 200 MG/1
200 TABLET ORAL 2 TIMES DAILY
Qty: 180 TABLET | Refills: 3 | Status: SHIPPED | OUTPATIENT
Start: 2023-11-13 | End: 2023-11-13

## 2023-11-13 NOTE — PROGRESS NOTES
[facility] Cardiology     Subjective:    Patient ID:  Lindy Sparks is a 73 y.o. female who presents for evaluation of Congestive Heart Failure, Atrial Fibrillation, Hypertension, and Interstitial Lung Disease    Review of patient's allergies indicates:   Allergen Reactions    Norvasc [amlodipine] Swelling    Buspar [buspirone] Other (See Comments)     Suicidal thoughts    Fluoxetine Other (See Comments)     Suicidal thoughts    Lisinopril Other (See Comments)     Unsure of reaction    Naproxen Other (See Comments)     Weakness, dizziness, chest pain    Pravastatin sodium      Chest pain  And headache      The patient has a history of paroxysmal AFib.  She has underlying interstitial lung disease.  A heart catheterization was performed in 2015 confirming mild luminal irregularities only.  She has longstanding hypertension.  Best I can see via chaRT REVIEW, she was in sinus rhythm August 2023.  Since then it looks like she is been in AFib.  Her most recent echo in August 2023 showed EF 50-55%.  There is no pulmonary hypertension.  She takes metoprolol and losartan for hypertension and AFib management.  She is been to the hospital and through the ED multiple times for shortness of breath and heart failure symptoms.    She is with her daughter today.  Her daughter is frustrated with her AFib management since she keeps going to the hospital for shortness of he also has had some chest discomfort complaints.  She is on Xarelto chronically.  She takes Lasix 40 mg per day.        Review of Systems   Constitutional: Negative for chills, decreased appetite, diaphoresis, fever, malaise/fatigue, night sweats, weight gain and weight loss.   HENT:  Negative for congestion, ear discharge, ear pain, hearing loss, hoarse voice, nosebleeds, odynophagia, sore throat, stridor and tinnitus.    Eyes:  Negative for blurred vision, discharge, double vision, pain,  photophobia, redness, vision loss in left eye, vision loss in right eye, visual disturbance and visual halos.   Cardiovascular:  Positive for dyspnea on exertion. Negative for chest pain, claudication, cyanosis, irregular heartbeat, leg swelling, near-syncope, orthopnea, palpitations, paroxysmal nocturnal dyspnea and syncope.   Respiratory:  Positive for shortness of breath. Negative for cough, hemoptysis, sleep disturbances due to breathing, snoring, sputum production and wheezing.    Endocrine: Negative for cold intolerance, heat intolerance, polydipsia, polyphagia and polyuria.   Hematologic/Lymphatic: Negative for adenopathy and bleeding problem. Does not bruise/bleed easily.   Skin:  Negative for color change, dry skin, flushing, itching, nail changes, poor wound healing, rash, skin cancer, suspicious lesions and unusual hair distribution.   Musculoskeletal:  Negative for arthritis, back pain, falls, gout, joint pain, joint swelling, muscle cramps, muscle weakness, myalgias, neck pain and stiffness.   Gastrointestinal:  Negative for bloating, abdominal pain, anorexia, change in bowel habit, bowel incontinence, constipation, diarrhea, dysphagia, excessive appetite, flatus, heartburn, hematemesis, hematochezia, hemorrhoids, jaundice, melena, nausea and vomiting.   Genitourinary:  Negative for bladder incontinence, decreased libido, dysuria, flank pain, frequency, genital sores, hematuria, hesitancy, incomplete emptying, nocturia and urgency.   Neurological:  Negative for aphonia, brief paralysis, difficulty with concentration, disturbances in coordination, excessive daytime sleepiness, dizziness, focal weakness, headaches, light-headedness, loss of balance, numbness, paresthesias, seizures, sensory change, tremors, vertigo and weakness.   Psychiatric/Behavioral:  Negative for altered mental status, depression, hallucinations, memory loss, substance abuse, suicidal ideas and thoughts of violence. The patient does  "not have insomnia and is not nervous/anxious.    Allergic/Immunologic: Negative for hives and persistent infections.        Objective:       Vitals:    11/13/23 1515   BP: 130/70   Pulse: 87   Resp: 18   SpO2: 98%   Weight: 91.6 kg (201 lb 15.1 oz)   Height: 5' 7" (1.702 m)    Physical Exam  Constitutional:       General: She is not in acute distress.     Appearance: She is well-developed. She is not diaphoretic.   HENT:      Head: Normocephalic and atraumatic.      Nose: Nose normal.   Eyes:      General: No scleral icterus.        Right eye: No discharge.      Conjunctiva/sclera: Conjunctivae normal.      Pupils: Pupils are equal, round, and reactive to light.   Neck:      Thyroid: No thyromegaly.      Vascular: No JVD.      Trachea: No tracheal deviation.   Cardiovascular:      Rate and Rhythm: Normal rate. Rhythm irregularly irregular.      Heart sounds: Normal heart sounds. No murmur heard.     No friction rub. No gallop.   Pulmonary:      Effort: Pulmonary effort is normal. No respiratory distress.      Breath sounds: No stridor. Examination of the right-upper field reveals rhonchi. Examination of the left-upper field reveals rhonchi. Examination of the right-middle field reveals rhonchi. Examination of the left-middle field reveals rhonchi. Examination of the right-lower field reveals rhonchi. Examination of the left-lower field reveals rhonchi. Rhonchi present. No wheezing or rales.   Chest:      Chest wall: No tenderness.   Abdominal:      General: Bowel sounds are normal. There is no distension.      Palpations: Abdomen is soft. There is no mass.      Tenderness: There is no abdominal tenderness. There is no guarding or rebound.   Musculoskeletal:         General: No tenderness. Normal range of motion.      Cervical back: Normal range of motion and neck supple.   Lymphadenopathy:      Cervical: No cervical adenopathy.   Skin:     General: Skin is warm and dry.      Coloration: Skin is not pale.      " Findings: No erythema or rash.   Neurological:      Mental Status: She is alert and oriented to person, place, and time.      Cranial Nerves: No cranial nerve deficit.      Coordination: Coordination normal.   Psychiatric:         Behavior: Behavior normal.         Thought Content: Thought content normal.         Judgment: Judgment normal.           Assessment:       1. Paroxysmal atrial fibrillation    2. Aortic atherosclerosis    3. Atrial fibrillation and flutter    4. Non-occlusive coronary artery disease    5. Essential hypertension    6. Heart failure with preserved ejection fraction, unspecified HF chronicity      Results for orders placed or performed during the hospital encounter of 11/07/23   CBC auto differential   Result Value Ref Range    WBC 9.30 3.90 - 12.70 K/uL    RBC 4.58 4.00 - 5.40 M/uL    Hemoglobin 12.7 12.0 - 16.0 g/dL    Hematocrit 39.0 37.0 - 48.5 %    MCV 85 82 - 98 fL    MCH 27.8 27.0 - 31.0 pg    MCHC 32.7 32.0 - 36.0 g/dL    RDW 15.9 (H) 11.5 - 14.5 %    Platelets 248 150 - 450 K/uL    MPV 8.9 7.4 - 10.4 fL    Gran # (ANC) 5.2 1.8 - 7.7 K/uL    Lymph # 2.7 1.0 - 4.8 K/uL    Mono # 1.0 0.3 - 1.0 K/uL    Eos # 0.3 0.0 - 0.5 K/uL    Baso # 0.10 0.00 - 0.20 K/uL    nRBC 0 0 /100 WBC    Gran % 56.2 38.0 - 73.0 %    Lymph % 29.6 18.0 - 48.0 %    Mono % 10.5 4.0 - 15.0 %    Eosinophil % 3.1 0.0 - 8.0 %    Basophil % 0.6 0.0 - 1.9 %    Differential Method Automated    Comprehensive metabolic panel   Result Value Ref Range    Sodium 139 136 - 145 mmol/L    Potassium 3.9 3.5 - 5.1 mmol/L    Chloride 106 95 - 110 mmol/L    CO2 24 23 - 29 mmol/L    Glucose 110 (H) 74 - 106 mg/dL    BUN 19 (H) 7 - 17 mg/dL    Creatinine 0.65 (L) 0.70 - 1.20 mg/dL    Calcium 9.0 8.4 - 10.2 mg/dL    Total Protein 7.5 6.3 - 8.2 g/dL    Albumin 3.9 3.5 - 5.2 g/dL    Total Bilirubin 0.9 0.2 - 1.3 mg/dL    Alkaline Phosphatase 65 38 - 145 U/L    AST 28 14 - 36 U/L    ALT 22 10 - 44 U/L    Anion Gap 9 8 - 16 mmol/L    eGFR  >60 >60 mL/min/1.73 m^2   Troponin I #1   Result Value Ref Range    Troponin I <0.012 0.012 - 0.034 ng/mL   NT-Pro Natriuretic Peptide   Result Value Ref Range    NT-proBNP 1510.0 (H) 0.0 - 899.9 pg/mL   Urinalysis, Reflex to Urine Culture Urine, Catheterized    Specimen: Urine, Clean Catch   Result Value Ref Range    Specimen UA Urine, Catheterized     Color, UA Yellow Yellow, Straw, Carolyn    Appearance, UA Clear Clear    pH, UA 5.5 5.0 - 9.0    Specific Gravity, UA 1.025     Protein, UA Trace (A) Negative    Glucose, UA Negative Negative    Ketones, UA Negative Negative    Bilirubin (UA) Negative Negative    Occult Blood UA 1+ (A) Negative    Nitrite, UA Positive (A) Negative    Urobilinogen, UA 2.0-3.0 (A) Negative EU/dL    Leukocytes, UA Negative Negative   COVID-19 Rapid Screening   Result Value Ref Range    SARS-CoV-2 RNA, Amplification, Qual Negative Negative   Urinalysis Microscopic   Result Value Ref Range    RBC, UA 2 0 - 4 /hpf    WBC, UA 3 0 - 5 /hpf    Bacteria Many (A) None-Occ /hpf    Squam Epithel, UA 5 /hpf    Hyaline Casts, UA 2 (A) 0 - 1 /lpf    Microscopic Comment SEE COMMENT      *Note: Due to a large number of results and/or encounters for the requested time period, some results have not been displayed. A complete set of results can be found in Results Review.         Current Outpatient Medications:     albuterol (PROVENTIL) 2.5 mg /3 mL (0.083 %) nebulizer solution, Take 3 mLs (2.5 mg total) by nebulization every 6 (six) hours as needed for Wheezing (wheezing). USE AS DIRECTED, Disp: 300 mL, Rfl: 3    albuterol (PROVENTIL/VENTOLIN HFA) 90 mcg/actuation inhaler, Inhale 2 puffs into the lungs every 6 (six) hours as needed for Wheezing (SOB). Rescue, Disp: 18 g, Rfl: 3    aspirin (ECOTRIN) 81 MG EC tablet, TAKE 1 TABLET BY MOUTH ONCE DAILY, Disp: 100 tablet, Rfl: 0    budesonide (PULMICORT) 0.5 mg/2 mL nebulizer solution, Take 2 mLs (0.5 mg total) by nebulization 2 (two) times a day. Controller,  Disp: 120 mL, Rfl: 11    calcitRIOL (ROCALTROL) 0.5 MCG Cap, TAKE 1 CAPSULE(0.5 MCG) BY MOUTH EVERY DAY, Disp: 30 capsule, Rfl: 3    DOCUSATE CALCIUM (STOOL SOFTENER ORAL), Take by mouth as needed. Patient unsure of dose, Disp: , Rfl:     EScitalopram oxalate (LEXAPRO) 20 MG tablet, Take 20 mg by mouth Daily., Disp: , Rfl:     furosemide (LASIX) 40 MG tablet, TAKE 1 TABLET BY MOUTH AS NEEDED ONLY FOR SWELLING/FLUID, Disp: 90 tablet, Rfl: 3    losartan (COZAAR) 25 MG tablet, Take 1 tablet (25 mg total) by mouth every evening., Disp: 90 tablet, Rfl: 1    metoprolol succinate (TOPROL-XL) 50 MG 24 hr tablet, Take 2 tablets (100 mg total) by mouth once daily., Disp: 90 tablet, Rfl: 3    omeprazole (PRILOSEC) 40 MG capsule, TAKE 1 CAPSULE BY MOUTH EVERY DAY TO PREVENT ASPIRATION, Disp: 90 capsule, Rfl: 0    rivaroxaban (XARELTO) 20 mg Tab, Take 1 tablet (20 mg total) by mouth every evening., Disp: 90 tablet, Rfl: 0    sucralfate (CARAFATE) 1 gram tablet, Take 1 tablet (1 g total) by mouth 4 (four) times daily., Disp: 80 tablet, Rfl: 5    flecainide (TAMBOCOR) 100 MG Tab, Take 1 tablet (100 mg total) by mouth every 12 (twelve) hours., Disp: 180 tablet, Rfl: 3    HYDROcodone-acetaminophen (NORCO) 7.5-325 mg per tablet, Take 1 tablet by mouth every 6 (six) hours as needed for Pain. Do not fill until 11/13/23  60 to last 30 days., Disp: 60 tablet, Rfl: 0     Lab Results   Component Value Date    WBC 9.30 11/07/2023    RBC 4.58 11/07/2023    HGB 12.7 11/07/2023    HCT 39.0 11/07/2023    MCV 85 11/07/2023    MCH 27.8 11/07/2023    MCHC 32.7 11/07/2023    RDW 15.9 (H) 11/07/2023     11/07/2023    MPV 8.9 11/07/2023    GRAN 5.2 11/07/2023    GRAN 56.2 11/07/2023    LYMPH 2.7 11/07/2023    LYMPH 29.6 11/07/2023    MONO 1.0 11/07/2023    MONO 10.5 11/07/2023    EOS 0.3 11/07/2023    BASO 0.10 11/07/2023    EOSINOPHIL 3.1 11/07/2023    BASOPHIL 0.6 11/07/2023    MG 2.0 11/02/2023        CMP  Lab Results   Component Value Date      11/07/2023    K 3.9 11/07/2023     11/07/2023    CO2 24 11/07/2023     (H) 11/07/2023    BUN 19 (H) 11/07/2023    CREATININE 0.65 (L) 11/07/2023    CALCIUM 9.0 11/07/2023    PROT 7.5 11/07/2023    ALBUMIN 3.9 11/07/2023    BILITOT 0.9 11/07/2023    ALKPHOS 65 11/07/2023    AST 28 11/07/2023    ALT 22 11/07/2023    ANIONGAP 9 11/07/2023    ESTGFRAFRICA >60.0 07/07/2022    EGFRNONAA >60.0 07/07/2022        Lab Results   Component Value Date    LABBLOO No growth after 5 days. 09/15/2023    LABURIN ESCHERICHIA COLI ESBL  >100,000 cfu/ml   (A) 09/15/2023            Results for orders placed or performed during the hospital encounter of 11/07/23   EKG 12-lead    Collection Time: 11/07/23 12:14 PM    Narrative    Test Reason : R06.02,    Vent. Rate : 115 BPM     Atrial Rate : 000 BPM     P-R Int : 000 ms          QRS Dur : 068 ms      QT Int : 330 ms       P-R-T Axes : 000 -54 085 degrees     QTc Int : 456 ms    Atrial fibrillation with rapid ventricular response  Left axis deviation  Low voltage QRS  Cannot rule out Anterior infarct (cited on or before 07-NOV-2023)  Abnormal ECG  When compared with ECG of 02-NOV-2023 10:53,  The axis Shifted left  ST no longer elevated in Lateral leads  Confirmed by Jerry Burgess MD ([id number]) on 11/8/2023 12:04:38 PM    Referred By: JULIAN PCP           Confirmed By:Jerry Burgess MD                  Plan:       Problem List Items Addressed This Visit          Cardiac/Vascular    Essential hypertension (Chronic)     She is on losartan, Toprol.  Blood pressure today 130/70.  No changes made.         Non-occlusive coronary artery disease     Coronary angiogram 2015, mild luminal irregularities.  Most recent stress test 2022-negative for ischemia.         Paroxysmal atrial fibrillation - Primary    Relevant Medications    flecainide (TAMBOCOR) 100 MG Tab    Aortic atherosclerosis     Condition stable.         (HFpEF) heart failure with preserved ejection fraction      Most likely related to AFib.  Clearly her interstitial lung disease as another component of dyspnea.         Atrial fibrillation and flutter     Considered persistent at this time.  Flecainide 100 mg b.i.d. ordered.  She has no history of significant coronary disease and her ejection fraction is normal.    She is on anticoagulation with Xarelto.  Cardioversion was discussed.                 Flecainide 100 mg b.i.d. ordered.  I will see her back in 3 weeks.  I am planning on performing a cardioversion.  She is in AFib today with controlled heart rates.    I told her daughter that her underlying lung disease is also a contributing factor to her shortness of breath complaints.               Mahendra Saldaña MD  11/14/2023   3:44 PM

## 2023-11-14 PROBLEM — R68.89 ALTERATION IN ACTIVITY: Status: ACTIVE | Noted: 2023-09-26

## 2023-11-14 PROBLEM — M25.631 DECREASED RANGE OF MOTION OF RIGHT WRIST: Status: ACTIVE | Noted: 2023-11-14

## 2023-11-14 PROBLEM — R29.898 RIGHT HAND WEAKNESS: Chronic | Status: ACTIVE | Noted: 2023-11-14

## 2023-11-14 NOTE — ASSESSMENT & PLAN NOTE
Considered persistent at this time.  Flecainide 100 mg b.i.d. ordered.  She has no history of significant coronary disease and her ejection fraction is normal.    She is on anticoagulation with Xarelto.  Cardioversion was discussed.

## 2023-11-14 NOTE — ASSESSMENT & PLAN NOTE
Most likely related to AFib.  Clearly her interstitial lung disease as another component of dyspnea.

## 2023-11-14 NOTE — ASSESSMENT & PLAN NOTE
Coronary angiogram 2015, mild luminal irregularities.  Most recent stress test 2022-negative for ischemia.

## 2023-11-18 PROBLEM — J96.01 ACUTE HYPOXEMIC RESPIRATORY FAILURE: Status: RESOLVED | Noted: 2023-08-09 | Resolved: 2023-11-18

## 2023-11-20 PROBLEM — E66.811 CLASS 1 OBESITY DUE TO EXCESS CALORIES WITH SERIOUS COMORBIDITY AND BODY MASS INDEX (BMI) OF 31.0 TO 31.9 IN ADULT: Status: ACTIVE | Noted: 2023-11-20

## 2023-11-20 PROBLEM — J98.4 RESTRICTIVE LUNG DISEASE: Status: RESOLVED | Noted: 2017-08-23 | Resolved: 2023-11-20

## 2023-11-20 PROBLEM — F41.9 ANXIETY DISORDER: Status: RESOLVED | Noted: 2017-11-01 | Resolved: 2023-11-20

## 2023-11-20 PROBLEM — Z71.89 CPAP USE COUNSELING: Status: ACTIVE | Noted: 2023-11-20

## 2023-11-20 PROBLEM — R93.89 ABNORMAL CT OF THE CHEST: Status: ACTIVE | Noted: 2023-11-20

## 2023-11-20 PROBLEM — I34.0 NONRHEUMATIC MITRAL VALVE REGURGITATION: Status: ACTIVE | Noted: 2023-11-20

## 2023-11-20 PROBLEM — I51.7 RIGHT ATRIAL ENLARGEMENT: Status: ACTIVE | Noted: 2023-11-20

## 2023-11-20 PROBLEM — Z91.89 MULTIPLE RISK FACTORS FOR CORONARY ARTERY DISEASE: Status: ACTIVE | Noted: 2023-11-20

## 2023-11-20 PROBLEM — E78.49 OTHER HYPERLIPIDEMIA: Status: ACTIVE | Noted: 2023-11-20

## 2023-11-20 PROBLEM — R06.09 DYSPNEA ON EXERTION: Status: ACTIVE | Noted: 2023-11-20

## 2023-11-20 PROBLEM — E66.09 CLASS 1 OBESITY DUE TO EXCESS CALORIES WITH SERIOUS COMORBIDITY AND BODY MASS INDEX (BMI) OF 31.0 TO 31.9 IN ADULT: Status: ACTIVE | Noted: 2023-11-20

## 2023-11-20 PROBLEM — I51.7 LEFT ATRIAL ENLARGEMENT: Status: ACTIVE | Noted: 2023-11-20

## 2023-11-20 PROBLEM — R94.31 ABNORMAL EKG: Status: ACTIVE | Noted: 2023-11-20

## 2023-11-21 PROBLEM — I48.91 ATRIAL FIBRILLATION AND FLUTTER: Status: RESOLVED | Noted: 2023-08-18 | Resolved: 2023-11-21

## 2023-11-21 PROBLEM — I48.92 ATRIAL FIBRILLATION AND FLUTTER: Status: RESOLVED | Noted: 2023-08-18 | Resolved: 2023-11-21

## 2023-11-28 PROBLEM — F33.1 MODERATE RECURRENT MAJOR DEPRESSION: Status: ACTIVE | Noted: 2023-11-28

## 2023-12-14 PROBLEM — R41.89 COGNITIVE IMPAIRMENT: Status: ACTIVE | Noted: 2023-12-14

## 2024-01-18 ENCOUNTER — OFFICE VISIT (OUTPATIENT)
Dept: GASTROENTEROLOGY | Facility: CLINIC | Age: 74
End: 2024-01-18
Payer: MEDICAID

## 2024-01-18 VITALS
WEIGHT: 199.75 LBS | HEIGHT: 67 IN | SYSTOLIC BLOOD PRESSURE: 139 MMHG | HEART RATE: 64 BPM | BODY MASS INDEX: 31.35 KG/M2 | DIASTOLIC BLOOD PRESSURE: 82 MMHG

## 2024-01-18 DIAGNOSIS — R10.84 GENERALIZED ABDOMINAL PAIN: ICD-10-CM

## 2024-01-18 DIAGNOSIS — R13.19 ESOPHAGEAL DYSPHAGIA: Primary | ICD-10-CM

## 2024-01-18 PROCEDURE — 1159F MED LIST DOCD IN RCRD: CPT | Mod: CPTII,,, | Performed by: NURSE PRACTITIONER

## 2024-01-18 PROCEDURE — 3008F BODY MASS INDEX DOCD: CPT | Mod: CPTII,,, | Performed by: NURSE PRACTITIONER

## 2024-01-18 PROCEDURE — 3075F SYST BP GE 130 - 139MM HG: CPT | Mod: CPTII,,, | Performed by: NURSE PRACTITIONER

## 2024-01-18 PROCEDURE — 1100F PTFALLS ASSESS-DOCD GE2>/YR: CPT | Mod: CPTII,,, | Performed by: NURSE PRACTITIONER

## 2024-01-18 PROCEDURE — 99999 PR PBB SHADOW E&M-EST. PATIENT-LVL V: CPT | Mod: PBBFAC,,, | Performed by: NURSE PRACTITIONER

## 2024-01-18 PROCEDURE — 1157F ADVNC CARE PLAN IN RCRD: CPT | Mod: CPTII,,, | Performed by: NURSE PRACTITIONER

## 2024-01-18 PROCEDURE — 3288F FALL RISK ASSESSMENT DOCD: CPT | Mod: CPTII,,, | Performed by: NURSE PRACTITIONER

## 2024-01-18 PROCEDURE — 99215 OFFICE O/P EST HI 40 MIN: CPT | Mod: PBBFAC,PN | Performed by: NURSE PRACTITIONER

## 2024-01-18 PROCEDURE — 99214 OFFICE O/P EST MOD 30 MIN: CPT | Mod: S$PBB,,, | Performed by: NURSE PRACTITIONER

## 2024-01-18 PROCEDURE — 1125F AMNT PAIN NOTED PAIN PRSNT: CPT | Mod: CPTII,,, | Performed by: NURSE PRACTITIONER

## 2024-01-18 PROCEDURE — 3079F DIAST BP 80-89 MM HG: CPT | Mod: CPTII,,, | Performed by: NURSE PRACTITIONER

## 2024-01-18 PROCEDURE — 4010F ACE/ARB THERAPY RXD/TAKEN: CPT | Mod: CPTII,,, | Performed by: NURSE PRACTITIONER

## 2024-01-18 PROCEDURE — 1160F RVW MEDS BY RX/DR IN RCRD: CPT | Mod: CPTII,,, | Performed by: NURSE PRACTITIONER

## 2024-01-18 RX ORDER — AMITRIPTYLINE HYDROCHLORIDE 25 MG/1
25 TABLET, FILM COATED ORAL NIGHTLY
Qty: 30 TABLET | Refills: 5 | Status: SHIPPED | OUTPATIENT
Start: 2024-01-18 | End: 2024-05-28

## 2024-01-18 RX ORDER — DICYCLOMINE HYDROCHLORIDE 20 MG/1
20 TABLET ORAL 3 TIMES DAILY PRN
Qty: 60 TABLET | Refills: 2 | Status: SHIPPED | OUTPATIENT
Start: 2024-01-18
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
## 2024-01-19 PROBLEM — C4A.9 MERKEL CELL CARCINOMA: Status: ACTIVE | Noted: 2024-01-19

## 2024-01-22 ENCOUNTER — TELEPHONE (OUTPATIENT)
Dept: GASTROENTEROLOGY | Facility: CLINIC | Age: 74
End: 2024-01-22
Payer: MEDICAID

## 2024-01-22 ENCOUNTER — PATIENT MESSAGE (OUTPATIENT)
Dept: RHEUMATOLOGY | Facility: CLINIC | Age: 74
End: 2024-01-22
Payer: MEDICAID

## 2024-01-22 NOTE — TELEPHONE ENCOUNTER
Left message for patient to call our office to schedule Esophagram.    ----- Message from RIMMA Noel sent at 1/19/2024  3:09 PM CST -----  Schedule esophagram.

## 2024-01-24 ENCOUNTER — PATIENT MESSAGE (OUTPATIENT)
Dept: GASTROENTEROLOGY | Facility: CLINIC | Age: 74
End: 2024-01-24
Payer: MEDICAID

## 2024-02-27 PROBLEM — M35.9 AUTOIMMUNE DISEASE: Status: ACTIVE | Noted: 2024-02-27

## 2024-03-19 ENCOUNTER — TELEPHONE (OUTPATIENT)
Dept: GASTROENTEROLOGY | Facility: CLINIC | Age: 74
End: 2024-03-19
Payer: MEDICAID

## 2024-03-19 NOTE — TELEPHONE ENCOUNTER
ANTICOAGULATION MANAGEMENT     Arun Wren 78 year old male is on warfarin with therapeutic INR result. (Goal INR 2.0-3.0)    Recent labs: (last 7 days)     07/29/22  1111   INR 2.4*       ASSESSMENT     Source(s): Chart Review and In person     Warfarin doses taken: Warfarin taken as instructed  Diet: No new diet changes identified  New illness, injury, or hospitalization: No  Medication/supplement changes: None noted  Signs or symptoms of bleeding or clotting: No  Previous INR: Therapeutic last 2(+) visits  Additional findings: None       PLAN     Recommended plan for no diet, medication or health factor changes affecting INR     Dosing Instructions: Continue your current warfarin dose with next INR in 3 weeks       Summary  As of 7/29/2022    Full warfarin instructions:  5 mg every Mon; 2.5 mg all other days   Next INR check:  8/19/2022             In person    Lab visit scheduled    Education provided: None required    Plan made per [facility] anticoagulation protocol    Sasha Brito RN  Anticoagulation Clinic  7/29/2022    _______________________________________________________________________     Anticoagulation Episode Summary     Current INR goal:  2.0-3.0   TTR:  47.1 % (1.4 mo)   Target end date:  9/8/2022   Send INR reminders to:  [facility]    Indications    Postoperative atrial fibrillation (H) [I97.89  I48.91]           Comments:           Anticoagulation Care Providers     Provider Role Specialty Phone number    Carlos Cote PA Referring Cardiovascular & Thoracic Surgery [phone]             MA attempted to contact patient to schedule a motility appointment, but she did not answer. A voicemail was left for patient to return a call to our office.

## 2024-04-15 ENCOUNTER — PATIENT MESSAGE (OUTPATIENT)
Dept: GASTROENTEROLOGY | Facility: CLINIC | Age: 74
End: 2024-04-15
Payer: MEDICAID

## 2024-06-19 ENCOUNTER — TELEPHONE (OUTPATIENT)
Dept: GASTROENTEROLOGY | Facility: CLINIC | Age: 74
End: 2024-06-19
Payer: MEDICAID

## 2024-06-19 NOTE — TELEPHONE ENCOUNTER
----- Message from Karrie Emanuel sent at 6/19/2024 12:04 PM CDT -----  Regarding: patient call back  Type: Patient Call Back    Who called: Self     What is the request in detail: asked for a call back from the office to get scheduled from referral     Can the clinic reply by [facility]? No     Would the patient rather a call back or a response via [facility]? Call     Best call back number: [phone]

## 2024-07-31 PROBLEM — C4A.71: Status: ACTIVE | Noted: 2024-07-31

## 2024-08-08 PROBLEM — Z92.89 HISTORY OF NUCLEAR STRESS TEST: Status: ACTIVE | Noted: 2024-08-08

## 2024-08-08 PROBLEM — Z86.73 HISTORY OF STROKE: Status: ACTIVE | Noted: 2024-08-08

## 2024-08-08 PROBLEM — I44.4 LEFT ANTERIOR FASCICULAR BLOCK: Status: ACTIVE | Noted: 2024-08-08

## 2024-08-08 PROBLEM — R09.89 BILATERAL CAROTID BRUITS: Status: ACTIVE | Noted: 2024-08-08

## 2024-08-26 PROBLEM — R09.89 BILATERAL CAROTID BRUITS: Status: RESOLVED | Noted: 2024-08-08 | Resolved: 2024-08-26

## 2024-09-03 ENCOUNTER — TELEPHONE (OUTPATIENT)
Dept: ENDOSCOPY | Facility: HOSPITAL | Age: 74
End: 2024-09-03
Payer: MEDICAID

## 2024-09-03 VITALS — HEIGHT: 67 IN | BODY MASS INDEX: 31.86 KG/M2 | WEIGHT: 203 LBS

## 2024-09-03 DIAGNOSIS — R13.10 DYSPHAGIA, UNSPECIFIED TYPE: Primary | ICD-10-CM

## 2024-09-03 NOTE — TELEPHONE ENCOUNTER
----- Message from Neeru Lovelace MD sent at 8/29/2024 10:09 AM CDT -----  Regarding: EGD with Endoflip with Botox vs Dilation  MOTILITY CLINIC PROCEDURE ORDERS    CLEARANCE FOR PROCEDURES:  [ ] Not needed       PROCEDURES    [ ] EGD with EndoFlip with Botox vs Dilation    FLOOR:  [ ] 4th Floor       PREP    [ ] Full liquid diet 1 day       MEDICATIONS    Pacemaker/defibrillator:  No    Motility Studies (esophageal manometry/anorectal manometry)  Hold narcotics x 1 days if able   Hold TCA x 1 days if able  Propofol/lidocaine only during sedation.  Discuss with Dr. Lisa if additional sedation needed.   Hold baclofen for thee days (at least one day) if able   Hold muscle relaxants x 1 day if able     Anticoagulation/antiplt agents:   No    ORDER OF TESTING:  Day 1: EGD with Endoflip with Botox vs Dilation      SCHEDULING PRIORITY  Routine    URGENCY     [x] Medically NOT Urgent      DIAGNOSIS   [ ] Dysphagia        PHYSICIAN  [ ]  Ok with Dr. Lovelace

## 2024-09-03 NOTE — TELEPHONE ENCOUNTER
Spoke to Lindy to schedule procedure(s) Upper Endoscopy (EGD)       Physician to perform procedure(s) Dr. HI Lovelace   Date of Procedure (s) 10/21/24  Arrival Time 8:45 AM  Time of Procedure(s) 9:45 AM   Location of Procedure(s) [address]  Type of Rx Prep sent to patient: Other  Instructions provided to patient via [facility]    Patient was informed on the following information and verbalized understanding. Screening questionnaire reviewed with patient and complete. If procedure requires anesthesia, a responsible adult needs to be present to accompany the patient home, patient cannot drive after receiving anesthesia. Appointment details are tentative, especially check-in time. Patient will receive a prep-op call 7 days prior to confirm check-in time for procedure. If applicable the patient should contact their pharmacy to verify Rx for procedure prep is ready for pick-up. Patient was advised to call the scheduling department at [phone] if pharmacy states no Rx is available. Patient was advised to call the endoscopy scheduling department if any questions or concerns arise.       Endoscopy Scheduling Department    Pt is currently taking Xarelto (rivaroxaban). Message sent to Endoscopy clearance nurse per protocol to submit Xarelto (rivaroxaban) hold.

## 2024-09-05 ENCOUNTER — TELEPHONE (OUTPATIENT)
Dept: ENDOSCOPY | Facility: HOSPITAL | Age: 74
End: 2024-09-05
Payer: MEDICAID

## 2024-09-05 NOTE — TELEPHONE ENCOUNTER
----- Message from Ema Strickland MA sent at 9/3/2024  3:26 PM CDT -----  Regarding: 10/21 CL BT  The patient is currently under an internal PCP Lindy [facility] and requires a blood thinner Xarelto (rivaroxaban) for their upcoming scheduled Upper Endoscopy (EGD) on 10/21/24.     Additional clearances required:  internal   cardiologist   blood thinner   Cardiology          Notes: Pt sees dr. Osmar Mares in Cardiology , Pt also sees Misty in pulmonary

## 2024-09-05 NOTE — TELEPHONE ENCOUNTER
Dear Dr Mares,    Patient has a scheduled procedure Upper Endoscopy (EGD) on 10/21/24 and is currently taking a blood thinner. In order to ensure patient safety, we would like to confirm that the patient can place their blood thinner medication on hold for the procedure. Can he/she discontinue Xarelto (rivaroxaban) for a minimum of 2 days prior to the procedure?     Thank you for your prompt reply.    [facility] Endoscopy Scheduling

## 2024-10-02 DIAGNOSIS — R49.0 CHRONIC HOARSENESS: ICD-10-CM

## 2024-10-02 DIAGNOSIS — R49.0 DYSPHONIA: Primary | ICD-10-CM

## 2024-10-14 ENCOUNTER — PATIENT MESSAGE (OUTPATIENT)
Dept: ENDOSCOPY | Facility: HOSPITAL | Age: 74
End: 2024-10-14
Payer: MEDICAID

## 2024-10-21 ENCOUNTER — HOSPITAL ENCOUNTER (OUTPATIENT)
Facility: HOSPITAL | Age: 74
Discharge: HOME | End: 2024-10-21
Attending: INTERNAL MEDICINE | Admitting: INTERNAL MEDICINE
Payer: MEDICAID

## 2024-10-21 ENCOUNTER — ANESTHESIA (OUTPATIENT)
Dept: ENDOSCOPY | Facility: HOSPITAL | Age: 74
End: 2024-10-21
Payer: MEDICAID

## 2024-10-21 ENCOUNTER — ANESTHESIA EVENT (OUTPATIENT)
Dept: ENDOSCOPY | Facility: HOSPITAL | Age: 74
End: 2024-10-21
Payer: MEDICAID

## 2024-10-21 VITALS
TEMPERATURE: 98 F | DIASTOLIC BLOOD PRESSURE: 107 MMHG | OXYGEN SATURATION: 100 % | SYSTOLIC BLOOD PRESSURE: 151 MMHG | WEIGHT: 204 LBS | HEART RATE: 56 BPM | BODY MASS INDEX: 32.02 KG/M2 | RESPIRATION RATE: 40 BRPM | HEIGHT: 67 IN

## 2024-10-21 DIAGNOSIS — R13.10 DYSPHAGIA: ICD-10-CM

## 2024-10-21 PROCEDURE — 63600175 PHARM REV CODE 636 W HCPCS: Performed by: NURSE ANESTHETIST, CERTIFIED REGISTERED

## 2024-10-21 PROCEDURE — 37000009 HC ANESTHESIA EA ADD 15 MINS: Performed by: INTERNAL MEDICINE

## 2024-10-21 PROCEDURE — 43239 EGD BIOPSY SINGLE/MULTIPLE: CPT | Performed by: INTERNAL MEDICINE

## 2024-10-21 PROCEDURE — 91040 ESOPH BALLOON DISTENSION TST: CPT | Mod: TC | Performed by: INTERNAL MEDICINE

## 2024-10-21 PROCEDURE — C1726 CATH, BAL DIL, NON-VASCULAR: HCPCS | Performed by: INTERNAL MEDICINE

## 2024-10-21 PROCEDURE — 27202363 HC INJECTION AGENT, SUBMUCOSAL, ANY: Performed by: INTERNAL MEDICINE

## 2024-10-21 PROCEDURE — 37000008 HC ANESTHESIA 1ST 15 MINUTES: Performed by: INTERNAL MEDICINE

## 2024-10-21 PROCEDURE — 43236 UPPR GI SCOPE W/SUBMUC INJ: CPT | Mod: 59,,, | Performed by: INTERNAL MEDICINE

## 2024-10-21 PROCEDURE — D9220A PRA ANESTHESIA: Mod: ANES,,, | Performed by: ANESTHESIOLOGY

## 2024-10-21 PROCEDURE — 91040 ESOPH BALLOON DISTENSION TST: CPT | Mod: 26,,, | Performed by: INTERNAL MEDICINE

## 2024-10-21 PROCEDURE — 63600175 PHARM REV CODE 636 W HCPCS: Mod: JZ,JG | Performed by: INTERNAL MEDICINE

## 2024-10-21 PROCEDURE — 88305 TISSUE EXAM BY PATHOLOGIST: CPT | Performed by: PATHOLOGY

## 2024-10-21 PROCEDURE — 43236 UPPR GI SCOPE W/SUBMUC INJ: CPT | Mod: 59 | Performed by: INTERNAL MEDICINE

## 2024-10-21 PROCEDURE — D9220A PRA ANESTHESIA: Mod: CRNA,,, | Performed by: NURSE ANESTHETIST, CERTIFIED REGISTERED

## 2024-10-21 PROCEDURE — 27201028 HC NEEDLE, SCLERO: Performed by: INTERNAL MEDICINE

## 2024-10-21 PROCEDURE — 27201012 HC FORCEPS, HOT/COLD, DISP: Performed by: INTERNAL MEDICINE

## 2024-10-21 PROCEDURE — 43239 EGD BIOPSY SINGLE/MULTIPLE: CPT | Mod: ,,, | Performed by: INTERNAL MEDICINE

## 2024-10-21 RX ORDER — PROPOFOL 10 MG/ML
VIAL (ML) INTRAVENOUS CONTINUOUS PRN
Status: DISCONTINUED | OUTPATIENT
Start: 2024-10-21 | End: 2024-10-21

## 2024-10-21 RX ORDER — GLUCAGON 1 MG
1 KIT INJECTION
Status: DISCONTINUED | OUTPATIENT
Start: 2024-10-21 | End: 2024-10-21 | Stop reason: HOSPADM

## 2024-10-21 RX ORDER — SODIUM CHLORIDE 9 MG/ML
INJECTION, SOLUTION INTRAVENOUS CONTINUOUS
Status: DISCONTINUED | OUTPATIENT
Start: 2024-10-21 | End: 2024-10-21 | Stop reason: HOSPADM

## 2024-10-21 RX ORDER — LIDOCAINE HYDROCHLORIDE 20 MG/ML
INJECTION INTRAVENOUS
Status: DISCONTINUED | OUTPATIENT
Start: 2024-10-21 | End: 2024-10-21

## 2024-10-21 RX ADMIN — PROPOFOL 150 MCG/KG/MIN: 10 INJECTION, EMULSION INTRAVENOUS at 11:10

## 2024-10-21 RX ADMIN — LIDOCAINE HYDROCHLORIDE 100 MG: 20 INJECTION INTRAVENOUS at 11:10

## 2024-10-21 RX ADMIN — PROPOFOL 50 MG: 10 INJECTION, EMULSION INTRAVENOUS at 11:10

## 2024-10-21 NOTE — TRANSFER OF CARE
"Anesthesia Transfer of Care Note    Patient: Lindy Sparks    Procedure(s) Performed: Procedure(s) (LRB):  EGD (ESOPHAGOGASTRODUODENOSCOPY) (N/A)    Patient location: [facility]    Anesthesia Type: general    Transport from OR: Transported from OR on 2-3 L/min O2 by NC with adequate spontaneous ventilation    Post pain: adequate analgesia    Post assessment: no apparent anesthetic complications    Post vital signs: stable    Level of consciousness: awake    Nausea/Vomiting: no nausea/vomiting    Complications: none    Transfer of care protocol was followed      Last vitals: Visit Vitals  /78   Pulse 67   Temp 36.7 °C (98 °F) (Temporal)   Resp 16   Ht 5' 7" (1.702 m)   Wt 92.5 kg (204 lb)   LMP  (LMP Unknown)   SpO2 100%   Breastfeeding No   BMI 31.95 kg/m²     "

## 2024-10-21 NOTE — ANESTHESIA PREPROCEDURE EVALUATION
10/21/2024  Lindy Sparks is a 74 y.o., female.      Pre-op Assessment    I have reviewed the Patient Summary Reports.     I have reviewed the Nursing Notes. I have reviewed the NPO Status.   I have reviewed the Medications.     Review of Systems  Anesthesia Hx:  No problems with previous Anesthesia   History of prior surgery of interest to airway management or planning:            Denies Personal Hx of Anesthesia complications.                    Hematology/Oncology:  Hematology Normal   Oncology Normal                                   EENT/Dental:  EENT/Dental Normal           Cardiovascular:     Hypertension   CAD  asymptomatic  Dysrhythmias         Denies THOMPSON.                              Pulmonary:    Asthma  Shortness of breath                  Renal/:  Renal/ Normal                 Hepatic/GI:     GERD                Musculoskeletal:  Arthritis               Neurological:   CVA, residual symptoms Neuromuscular Disease,  Headaches                                 Endocrine:  Endocrine Normal            Psych:  Psychiatric History                  Physical Exam  General: Well nourished, Cooperative, Alert and Oriented    Airway:  Mallampati: II   Mouth Opening: Normal  TM Distance: Normal  Tongue: Normal  Neck ROM: Normal ROM    Dental:    Chest/Lungs:  Normal Respiratory Rate    Heart:  Rate: Normal        Anesthesia Plan  Type of Anesthesia, risks & benefits discussed:    Anesthesia Type: Gen Natural Airway  Intra-op Monitoring Plan: Standard ASA Monitors  Post Op Pain Control Plan:   (medical reason for not using multimodal pain management)  Induction:  IV  Informed Consent: Informed consent signed with the Patient and all parties understand the risks and agree with anesthesia plan.  All questions answered.   ASA Score: 3  Day of Surgery Review of History & Physical: H&P Update referred to the  surgeon/provider.    Ready For Surgery From Anesthesia Perspective.     .

## 2024-10-21 NOTE — [employer] PATIENT INSTRUCTIONS
Discharge Summary/Instructions after an Endoscopic Procedure  Patient Name: Lindy Sparks  Patient MRN: [id number]  Patient YOB: 1950  Monday, October 21, 2024  Neeru Lovelace MD  Dear patient,  As a result of recent federal legislation (The Federal Cures Act), you may   receive lab or pathology results from your procedure in your [facility]   account before your physician is able to contact you. Your physician or   their representative will relay the results to you with their   recommendations at their soonest availability.  Thank you,  RESTRICTIONS:  During your procedure today, you received medications for sedation.  These   medications may affect your judgment, balance and coordination.  Therefore,   for 24 hours, you have the following restrictions:   - DO NOT drive a car, operate machinery, make legal/financial decisions,   sign important papers or drink alcohol.    ACTIVITY:  Today: no heavy lifting, straining or running due to procedural   sedation/anesthesia.  The following day: return to full activity including work.  DIET:  Eat and drink normally unless instructed otherwise.     TREATMENT FOR COMMON SIDE EFFECTS:  - Mild abdominal pain, nausea, belching, bloating or excessive gas:  rest,   eat lightly and use a heating pad.  - Sore Throat: treat with throat lozenges and/or gargle with warm salt   water.  - Because air was used during the procedure, expelling large amounts of air   from your rectum or belching is normal.  - If a bowel prep was taken, you may not have a bowel movement for 1-3 days.    This is normal.  SYMPTOMS TO WATCH FOR AND REPORT TO YOUR PHYSICIAN:  1. Abdominal pain or bloating, other than gas cramps.  2. Chest pain.  3. Back pain.  4. Signs of infection such as: chills or fever occurring within 24 hours   after the procedure.  5. Rectal bleeding, which would show as bright red, maroon, or black stools.   (A tablespoon of blood from the rectum is not serious, especially if    hemorrhoids are present.)  6. Vomiting.  7. Weakness or dizziness.  GO DIRECTLY TO THE NEAREST EMERGENCY ROOM IF YOU HAVE ANY OF THE FOLLOWING:      Difficulty breathing              Chills and/or fever over 101 F   Persistent vomiting and/or vomiting blood   Severe abdominal pain   Severe chest pain   Black, tarry stools   Bleeding- more than one tablespoon   Any other symptom or condition that you feel may need urgent attention  Your doctor recommends these additional instructions:  If any biopsies were taken, your doctors clinic will contact you in 1 to 2   weeks with any results.  - Discharge patient to home.   - Resume previous diet.   - Continue present medications. Resume Xarelto today.  - Await pathology results.  For questions, problems or results please call your physician - Neeru Lovelace MD at Work:  [phone].  [facility] [place], EMERGENCY ROOM PHONE NUMBER: [phone]  IF A COMPLICATION OR EMERGENCY SITUATION ARISES AND YOU ARE UNABLE TO REACH   YOUR PHYSICIAN - GO DIRECTLY TO THE EMERGENCY ROOM.  Neeru Lovelace MD  10/21/2024 11:43:00 AM  This report has been verified and signed electronically.  Dear patient,  As a result of recent federal legislation (The Federal Cures Act), you may   receive lab or pathology results from your procedure in your [facility]   account before your physician is able to contact you. Your physician or   their representative will relay the results to you with their   recommendations at their soonest availability.  Thank you,  PR[employer]ATION

## 2024-10-23 LAB
FINAL PATHOLOGIC DIAGNOSIS: NORMAL
GROSS: NORMAL
Lab: NORMAL

## 2024-10-23 NOTE — ANESTHESIA POSTPROCEDURE EVALUATION
Anesthesia Post Evaluation    Patient: Lindy Sparks    Procedure(s) Performed: Procedure(s) (LRB):  EGD (ESOPHAGOGASTRODUODENOSCOPY) (N/A)    Final Anesthesia Type: general      Patient location during evaluation: [facility] [place]  Patient participation: Yes- Able to Participate  Level of consciousness: awake and alert and oriented  Post-procedure vital signs: reviewed and stable  Pain management: adequate  Airway patency: patent    PONV status at discharge: No PONV  Anesthetic complications: no      Cardiovascular status: blood pressure returned to baseline and hemodynamically stable  Respiratory status: unassisted, spontaneous ventilation and room air  Hydration status: euvolemic  Follow-up not needed.              Vitals Value Taken Time   /107 10/21/24 1211   Temp 36.4 °C (97.5 °F) 10/21/24 1145   Pulse 56 10/21/24 1211   Resp 40 10/21/24 1211   SpO2 100 % 10/21/24 1211         No case tracking events are documented in the log.      Pain/Jamie Score: No data recorded

## 2024-10-31 ENCOUNTER — TELEPHONE (OUTPATIENT)
Dept: GASTROENTEROLOGY | Facility: CLINIC | Age: 74
End: 2024-10-31
Payer: MEDICAID

## 2024-12-11 ENCOUNTER — OFFICE VISIT (OUTPATIENT)
Dept: GASTROENTEROLOGY | Facility: CLINIC | Age: 74
End: 2024-12-11
Payer: MEDICAID

## 2024-12-11 ENCOUNTER — TELEPHONE (OUTPATIENT)
Dept: ENDOSCOPY | Facility: HOSPITAL | Age: 74
End: 2024-12-11
Payer: MEDICAID

## 2024-12-11 DIAGNOSIS — R13.10 DYSPHAGIA, UNSPECIFIED TYPE: Primary | ICD-10-CM

## 2024-12-11 DIAGNOSIS — K21.9 GASTROESOPHAGEAL REFLUX DISEASE, UNSPECIFIED WHETHER ESOPHAGITIS PRESENT: Primary | ICD-10-CM

## 2024-12-11 DIAGNOSIS — R10.9 ABDOMINAL CRAMPING: ICD-10-CM

## 2024-12-11 PROCEDURE — 1157F ADVNC CARE PLAN IN RCRD: CPT | Mod: CPTII,,, | Performed by: INTERNAL MEDICINE

## 2024-12-11 PROCEDURE — 3044F HG A1C LEVEL LT 7.0%: CPT | Mod: CPTII,,, | Performed by: INTERNAL MEDICINE

## 2024-12-11 PROCEDURE — 4010F ACE/ARB THERAPY RXD/TAKEN: CPT | Mod: CPTII,,, | Performed by: INTERNAL MEDICINE

## 2024-12-11 PROCEDURE — 99214 OFFICE O/P EST MOD 30 MIN: CPT | Mod: S$PBB,,, | Performed by: INTERNAL MEDICINE

## 2024-12-11 RX ORDER — OMEPRAZOLE 40 MG/1
40 CAPSULE, DELAYED RELEASE ORAL
Qty: 60 CAPSULE | Refills: 11 | Status: SHIPPED | OUTPATIENT
Start: 2024-12-11 | End: 2025-12-11

## 2024-12-11 RX ORDER — SUCRALFATE 1 G/10ML
1 SUSPENSION ORAL
Qty: 473 ML | Refills: 6 | Status: SHIPPED | OUTPATIENT
Start: 2024-12-11

## 2024-12-11 RX ORDER — DICYCLOMINE HYDROCHLORIDE 20 MG/1
20 TABLET ORAL EVERY 6 HOURS PRN
Qty: 120 TABLET | Refills: 6 | Status: SHIPPED | OUTPATIENT
Start: 2024-12-11

## 2024-12-11 NOTE — PROGRESS NOTES
[facility]      CC: dysphagia    HPI 74 y.o. female who presents for follow up of chronic, daily, worsening dysphagia to solids and liquids with associated GERD symptoms.    A previous Esophageal manometry showed EGJOO.  An EGD with Endoflip was performed and was positive for EGJOO.  Botox was injected into the LES.  Patient reports only about a week of improvement following this.    She does take opioid pain medications.    She previously had esophageal dilation with Dr. Doan and that helped for about a year.    Her GERD is not controlled with Omeprazole 40mg daily.    She previously took sucralfate as needed for her throat pain and chest discomfort and this helped.  She also previously took dicyclomine for her abdominal cramping and this helped her as well.    Past Medical History  Past Medical History:   Diagnosis Date    Acid reflux     Anxiety     Arthritis     Back pain     low back    Bilateral carotid bruits 08/08/2024    Cerebrovascular accident (CVA) due to embolism of left posterior cerebral artery 09/25/2019    Depression     Ectopic pregnancy     History of psychiatric hospitalization     Hx of psychiatric care     Incarcerated hernia     Merkel cell carcinoma     Migraine     Neuropathy     SHAW (obstructive sleep apnea)     Pelvic mass in female 10/08/2017    Psychiatric problem     PTSD (post-traumatic stress disorder)     Restrictive lung disease     Right hip pain     Suicide attempt     Therapy            Physical Examination  LMP  (LMP Unknown) Comment: 20 yrs ago, no PMB  General appearance: alert, cooperative, no distress  HENT: Normocephalic, atraumatic, neck symmetrical, no nasal discharge   Abdomen: soft, non-tender; bowel sounds normoactive; no organomegaly  Neurologic: Alert and oriented X 3, moves all four extremities, intact sensation to light touch    Labs:  Lab Results   Component Value Date    WBC 9.08 11/25/2024    HGB 12.4 11/25/2024    HCT 39.6 11/25/2024     MCV 87 11/25/2024     11/25/2024         CMP  Sodium   Date Value Ref Range Status   11/25/2024 139 136 - 145 mmol/L Final     Potassium   Date Value Ref Range Status   11/25/2024 4.0 3.5 - 5.1 mmol/L Final     Chloride   Date Value Ref Range Status   11/25/2024 108 95 - 110 mmol/L Final     CO2   Date Value Ref Range Status   11/25/2024 26 23 - 29 mmol/L Final     Glucose   Date Value Ref Range Status   11/25/2024 107 70 - 110 mg/dL Final     BUN   Date Value Ref Range Status   11/25/2024 15 8 - 23 mg/dL Final     Creatinine   Date Value Ref Range Status   11/25/2024 0.8 0.5 - 1.4 mg/dL Final     Calcium   Date Value Ref Range Status   11/25/2024 9.4 8.7 - 10.5 mg/dL Final     Total Protein   Date Value Ref Range Status   11/18/2024 7.4 6.0 - 8.4 g/dL Final     Albumin   Date Value Ref Range Status   11/18/2024 3.3 (L) 3.5 - 5.2 g/dL Final     Total Bilirubin   Date Value Ref Range Status   11/18/2024 0.6 0.1 - 1.0 mg/dL Final     Comment:     For infants and newborns, interpretation of results should be based  on gestational age, weight and in agreement with clinical  observations.    Premature Infant recommended reference ranges:  Up to 24 hours.............<8.0 mg/dL  Up to 48 hours............<12.0 mg/dL  3-5 days..................<15.0 mg/dL  6-29 days.................<15.0 mg/dL       Alkaline Phosphatase   Date Value Ref Range Status   11/18/2024 68 40 - 150 U/L Final     AST   Date Value Ref Range Status   11/18/2024 26 10 - 40 U/L Final     ALT   Date Value Ref Range Status   11/18/2024 24 10 - 44 U/L Final     Anion Gap   Date Value Ref Range Status   11/25/2024 5 (L) 8 - 16 mmol/L Final     eGFR   Date Value Ref Range Status   11/25/2024 >60.0 >60 mL/min/1.73 m^2 Final         Assessment:   1. Gastroesophageal reflux disease, unspecified whether esophagitis present    2. Abdominal cramping    3.      Dysphagia  4.      EGJOO  5.      Use of opioid pain medications.    Plan:  -Schedule EGD with  esophageal dilation.  -Increase Omeprazole to 40mg twice per day for GERD.  -Start Sucralfate as needed for breakthrough GERD symptoms, chest discomfort and throat pain.  -Start Dicyclomine as needed for abdominal cramping.    Neeru Lovelace MD

## 2024-12-11 NOTE — TELEPHONE ENCOUNTER
"----- Message from Neeru Lovelace MD sent at 12/11/2024  3:01 PM CST -----  Regarding: EGD with Dilation  Procedure: EGD with Dilation    Diagnosis: Dysphagia    Procedure Timing: next available    #If within 4 weeks selected, please tracy as high priority#    #If greater than 12 weeks, please select "5-12 weeks" and delay sending until 3 months prior to requested date#     Location: Hospital Based ([facility],  [facility], [facility])    Additional Scheduling Information: Blood thinners    Prep Specifications:2 days full liquid diet prior    Is the patient taking a GLP-1 Agonist:no    Have you attached a patient to this message: yes  "

## 2024-12-12 ENCOUNTER — DOCUMENTATION ONLY (OUTPATIENT)
Dept: PHARMACY | Facility: CLINIC | Age: 74
End: 2024-12-12
Payer: MEDICAID

## 2024-12-12 NOTE — TELEPHONE ENCOUNTER
Referral for procedure from [facility]      Spoke to patient to schedule procedure(s) Upper Endoscopy (EGD)       Physician to perform procedure(s) Dr. HI Lovelace  Date of Procedure (s) 01/08/2025  Arrival Time 11:30 AM   Time of Procedure(s) 12:30 PM    Location of Procedure(s) [address]   Type of Rx Prep sent to patient: N/A  Instructions provided to patient via [facility]    Patient was informed on the following information and verbalized understanding. Screening questionnaire reviewed with patient and complete. If procedure requires anesthesia, a responsible adult needs to be present to accompany the patient home, patient cannot drive after receiving anesthesia. Appointment details are tentative, especially check-in time. Patient will receive a prep-op call 7 days prior to confirm check-in time for procedure. If applicable the patient should contact their pharmacy to verify Rx for procedure prep is ready for pick-up. Patient was advised to call the scheduling department at [phone] if pharmacy states no Rx is available. Patient was advised to call the endoscopy scheduling department if any questions or concerns arise.       Endoscopy Scheduling Department

## 2024-12-16 ENCOUNTER — TELEPHONE (OUTPATIENT)
Dept: ENDOSCOPY | Facility: HOSPITAL | Age: 74
End: 2024-12-16
Payer: MEDICAID

## 2024-12-16 NOTE — TELEPHONE ENCOUNTER
Dear Dr Lima,    Patient has a scheduled procedure Upper Endoscopy (EGD) 1/8/25 and in order to ensure patient safety, we would like to confirm if he/she is medically optimized for the procedure.      Thank you for your prompt reply.    [facility] Endoscopy Scheduling

## 2024-12-16 NOTE — TELEPHONE ENCOUNTER
----- Message from Med Assistant Simon sent at 2024  1:19 PM CST -----  Regardin/08 BT/CL  The patient is currently under an external cardiologist julia Matt. Is patient okay to hold blood thinner Eliquis (apixaban) for their upcoming scheduled Upper Endoscopy (EGD) on 2025.     Additional request(s) required:  internal pulmonologistDr/ Clarence  medically optimized by Pulmonology         External provider information:    Kristie Vega NP  [phone] (Work)  [address] [place] [id number]       Notes:

## 2024-12-16 NOTE — TELEPHONE ENCOUNTER
----- Message from Med Assistant Simon sent at 2024  1:19 PM CST -----  Regardin/08 BT/CL  The patient is currently under an external cardiologist julia Matt. Is patient okay to hold blood thinner Eliquis (apixaban) for their upcoming scheduled Upper Endoscopy (EGD) on 2025.     Additional request(s) required:  internal pulmonologistDr/ Clarence  medically optimized by Pulmonology         External provider information:    Kristie Vega NP  [phone] (Work)  [address] [place] [id number]       Notes:

## 2024-12-16 NOTE — TELEPHONE ENCOUNTER
Dear Dr Mares,    Patient has a scheduled procedure Upper Endoscopy (EGD) on 1/8/25 and is currently taking a blood thinner. In order to ensure patient safety, we would like to confirm that the patient can place their blood thinner medication on hold for the procedure. Can he/she discontinue Eliquis (apixaban) for a minimum of 2 days prior to the procedure?     Thank you for your prompt reply.    [facility] Endoscopy Scheduling

## 2024-12-20 NOTE — TELEPHONE ENCOUNTER
I apologize for documenting Eliquis when it should have been Xarelto.  Patient is on Xarelto.  Thank you.

## 2024-12-23 ENCOUNTER — HOSPITAL ENCOUNTER (OUTPATIENT)
Dept: SLEEP MEDICINE | Facility: HOSPITAL | Age: 74
Discharge: HOME | End: 2024-12-23
Attending: STUDENT IN AN ORGANIZED HEALTH CARE EDUCATION/TRAINING PROGRAM
Payer: MEDICAID

## 2024-12-23 DIAGNOSIS — G47.33 OSA (OBSTRUCTIVE SLEEP APNEA): ICD-10-CM

## 2024-12-23 PROCEDURE — 95810 POLYSOM 6/> YRS 4/> PARAM: CPT

## 2024-12-26 PROBLEM — G47.33 OSA (OBSTRUCTIVE SLEEP APNEA): Status: ACTIVE | Noted: 2024-12-26

## 2025-01-07 ENCOUNTER — ANESTHESIA EVENT (OUTPATIENT)
Dept: ENDOSCOPY | Facility: HOSPITAL | Age: 75
End: 2025-01-07
Payer: MEDICAID

## 2025-01-08 ENCOUNTER — HOSPITAL ENCOUNTER (OUTPATIENT)
Facility: HOSPITAL | Age: 75
Discharge: HOME | End: 2025-01-08
Attending: INTERNAL MEDICINE | Admitting: INTERNAL MEDICINE
Payer: MEDICAID

## 2025-01-08 ENCOUNTER — ANESTHESIA (OUTPATIENT)
Dept: ENDOSCOPY | Facility: HOSPITAL | Age: 75
End: 2025-01-08
Payer: MEDICAID

## 2025-01-08 VITALS
DIASTOLIC BLOOD PRESSURE: 86 MMHG | OXYGEN SATURATION: 98 % | HEART RATE: 98 BPM | RESPIRATION RATE: 16 BRPM | SYSTOLIC BLOOD PRESSURE: 143 MMHG | TEMPERATURE: 98 F

## 2025-01-08 DIAGNOSIS — R13.10 DYSPHAGIA: ICD-10-CM

## 2025-01-08 PROCEDURE — 43249 ESOPH EGD DILATION <30 MM: CPT | Mod: ,,, | Performed by: INTERNAL MEDICINE

## 2025-01-08 PROCEDURE — 63600175 PHARM REV CODE 636 W HCPCS: Performed by: NURSE ANESTHETIST, CERTIFIED REGISTERED

## 2025-01-08 PROCEDURE — 88305 TISSUE EXAM BY PATHOLOGIST: CPT | Performed by: PATHOLOGY

## 2025-01-08 PROCEDURE — D9220A PRA ANESTHESIA: Mod: ,,, | Performed by: NURSE ANESTHETIST, CERTIFIED REGISTERED

## 2025-01-08 PROCEDURE — 43239 EGD BIOPSY SINGLE/MULTIPLE: CPT | Mod: 59,,, | Performed by: INTERNAL MEDICINE

## 2025-01-08 PROCEDURE — 88305 TISSUE EXAM BY PATHOLOGIST: CPT | Mod: 26,,, | Performed by: PATHOLOGY

## 2025-01-08 PROCEDURE — 43249 ESOPH EGD DILATION <30 MM: CPT | Performed by: INTERNAL MEDICINE

## 2025-01-08 PROCEDURE — 27201012 HC FORCEPS, HOT/COLD, DISP: Performed by: INTERNAL MEDICINE

## 2025-01-08 PROCEDURE — 43239 EGD BIOPSY SINGLE/MULTIPLE: CPT | Mod: 59 | Performed by: INTERNAL MEDICINE

## 2025-01-08 PROCEDURE — C1726 CATH, BAL DIL, NON-VASCULAR: HCPCS | Performed by: INTERNAL MEDICINE

## 2025-01-08 PROCEDURE — 25000003 PHARM REV CODE 250: Performed by: NURSE ANESTHETIST, CERTIFIED REGISTERED

## 2025-01-08 PROCEDURE — 37000009 HC ANESTHESIA EA ADD 15 MINS: Performed by: INTERNAL MEDICINE

## 2025-01-08 PROCEDURE — 37000008 HC ANESTHESIA 1ST 15 MINUTES: Performed by: INTERNAL MEDICINE

## 2025-01-08 RX ORDER — PROPOFOL 10 MG/ML
VIAL (ML) INTRAVENOUS
Status: DISCONTINUED | OUTPATIENT
Start: 2025-01-08 | End: 2025-01-08

## 2025-01-08 RX ORDER — ESMOLOL HYDROCHLORIDE 10 MG/ML
INJECTION INTRAVENOUS
Status: DISCONTINUED
Start: 2025-01-08 | End: 2025-01-08 | Stop reason: HOSPADM

## 2025-01-08 RX ORDER — LIDOCAINE HYDROCHLORIDE 40 MG/ML
SOLUTION TOPICAL
Status: DISCONTINUED
Start: 2025-01-08 | End: 2025-01-08 | Stop reason: HOSPADM

## 2025-01-08 RX ORDER — LIDOCAINE HYDROCHLORIDE 20 MG/ML
INJECTION, SOLUTION EPIDURAL; INFILTRATION; INTRACAUDAL; PERINEURAL
Status: DISCONTINUED
Start: 2025-01-08 | End: 2025-01-08 | Stop reason: HOSPADM

## 2025-01-08 RX ORDER — LIDOCAINE HYDROCHLORIDE 20 MG/ML
INJECTION INTRAVENOUS
Status: DISCONTINUED | OUTPATIENT
Start: 2025-01-08 | End: 2025-01-08

## 2025-01-08 RX ORDER — PROPOFOL 10 MG/ML
VIAL (ML) INTRAVENOUS
Status: DISCONTINUED
Start: 2025-01-08 | End: 2025-01-08 | Stop reason: HOSPADM

## 2025-01-08 RX ORDER — SODIUM CHLORIDE 9 MG/ML
INJECTION, SOLUTION INTRAVENOUS CONTINUOUS
Status: DISCONTINUED | OUTPATIENT
Start: 2025-01-08 | End: 2025-01-08 | Stop reason: HOSPADM

## 2025-01-08 RX ORDER — LIDOCAINE HYDROCHLORIDE 40 MG/ML
SOLUTION TOPICAL
Status: DISCONTINUED | OUTPATIENT
Start: 2025-01-08 | End: 2025-01-08

## 2025-01-08 RX ORDER — LIDOCAINE HYDROCHLORIDE 10 MG/ML
1 INJECTION, SOLUTION EPIDURAL; INFILTRATION; INTRACAUDAL; PERINEURAL ONCE
Status: DISCONTINUED | OUTPATIENT
Start: 2025-01-08 | End: 2025-01-08 | Stop reason: HOSPADM

## 2025-01-08 RX ADMIN — PROPOFOL 20 MG: 10 INJECTION, EMULSION INTRAVENOUS at 02:01

## 2025-01-08 RX ADMIN — LIDOCAINE HYDROCHLORIDE 4 MCG: 40 SOLUTION TOPICAL at 02:01

## 2025-01-08 RX ADMIN — LIDOCAINE HYDROCHLORIDE 100 MG: 20 INJECTION, SOLUTION INTRAVENOUS at 02:01

## 2025-01-08 RX ADMIN — PROPOFOL 10 MG: 10 INJECTION, EMULSION INTRAVENOUS at 02:01

## 2025-01-08 RX ADMIN — PROPOFOL 40 MG: 10 INJECTION, EMULSION INTRAVENOUS at 02:01

## 2025-01-08 RX ADMIN — SODIUM CHLORIDE: 0.9 INJECTION, SOLUTION INTRAVENOUS at 02:01

## 2025-01-08 RX ADMIN — PROPOFOL 80 MG: 10 INJECTION, EMULSION INTRAVENOUS at 02:01

## 2025-01-08 NOTE — PLAN OF CARE
Procedure and recovery complete. Patient awake and alert. MD at bedside, procedure findings and suggestions discussed. Discharge instructions given, patient verbalized understanding of instructions. Pt discharged via wheelchair accompanied by daughter.
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
## 2025-01-08 NOTE — H&P
Short Stay Endoscopy History and Physical    PCP - Lindy Amor MD     Procedure - EGD with Dilation  ASA - per anesthesia  Mallampati - per anesthesia  History of Anesthesia problems - no  Family history Anesthesia problems -  no   Plan of anesthesia - General    HPI:  This is a 74 y.o. female here for evaluation of : Dysphagia      ROS:  Constitutional: No fevers, chills, No weight loss  CV: No chest pain  Pulm: No cough, No shortness of breath  Ophtho: No vision changes  GI: see HPI  Derm: No rash    Medical History:  has a past medical history of Acid reflux, Anxiety, Arthritis, Back pain, Bilateral carotid bruits (2024), Cerebrovascular accident (CVA) due to embolism of left posterior cerebral artery (2019), Depression, Ectopic pregnancy, History of psychiatric hospitalization, psychiatric care, Incarcerated hernia, Merkel cell carcinoma, Migraine, Neuropathy, SHAW (obstructive sleep apnea), Pelvic mass in female (10/08/2017), Psychiatric problem, PTSD (post-traumatic stress disorder), Restrictive lung disease, Right hip pain, Suicide attempt, and Therapy.    Surgical History:  has a past surgical history that includes Tonsillectomy; Cholecystectomy; Colonoscopy;  section; Ectopic pregnancy surgery; Oophorectomy; Cardiac catheterization; Umbilical hernia repair (N/A, 2018); Hysterectomy (10/30/2017); Esophagogastroduodenoscopy (N/A, 09/15/2022); Colonoscopy (N/A, 09/15/2022); Cataract extraction w/  intraocular lens implant (Left, 2022); TONSILLECTOMY, ADENOIDECTOMY; Cataract extraction w/ intraocular lens implant (Right, 2022); Peripherally inserted central catheter insertion (N/A, 2023); Esophageal manometry with measurement of impedance (N/A, 10/12/2023); excision-wide local (Right, 2/15/2024); [place] lymph node biopsy (Right, 2/15/2024); and Esophagogastroduodenoscopy (N/A, 10/21/2024).    Family History: family history includes Diabetes in her sister;  Heart disease in her father; Hyperlipidemia in her mother, sister, sister, and sister; No Known Problems in her brother, brother, brother, brother, brother, brother, brother, daughter, daughter, sister, sister, sister, sister, and son; Stroke in her brother, father, and mother.. Otherwise no colon cancer, inflammatory bowel disease, or GI malignancies.    Social History:  reports that she has never smoked. She has never used smokeless tobacco. She reports that she does not drink alcohol and does not use drugs.    Review of patient's allergies indicates:   Allergen Reactions    Norvasc [amlodipine] Swelling    Buspar [buspirone] Other (See Comments)     Suicidal thoughts    Fluoxetine Other (See Comments)     Suicidal thoughts    Lisinopril Other (See Comments)     Unsure of reaction    Naproxen Other (See Comments)     Weakness, dizziness, chest pain    Pravastatin sodium      Chest pain  And headache       Medications:   Medications Prior to Admission   Medication Sig Dispense Refill Last Dose/Taking    albuterol (PROVENTIL) 2.5 mg /3 mL (0.083 %) nebulizer solution Take 3 mLs (2.5 mg total) by nebulization every 6 (six) hours as needed for Wheezing (wheezing). USE AS DIRECTED 300 mL 3     ammonium lactate 12 % Crea Aaa qd after shower prn dry skin 385 g 11     aspirin (ECOTRIN) 81 MG EC tablet Take 1 tablet (81 mg total) by mouth once daily. 100 tablet 3     calcitRIOL (ROCALTROL) 0.5 MCG Cap TAKE 1 CAPSULE(0.5 MCG) BY MOUTH EVERY DAY 30 capsule 3     diclofenac sodium (VOLTAREN ARTHRITIS PAIN) 1 % Gel Apply 2 g topically once daily. 20 g 0     dicyclomine (BENTYL) 20 mg tablet Take 1 tablet (20 mg total) by mouth every 6 (six) hours as needed (abdominal cramping). 120 tablet 6     DOCUSATE CALCIUM (STOOL SOFTENER ORAL) Take by mouth as needed. Patient unsure of dose       EScitalopram oxalate (LEXAPRO) 20 MG tablet Take 1 tablet (20 mg total) by mouth Daily. 90 tablet 0     fluticasone-umeclidin-vilanter (TRELEGY  ELLIPTA) 200-62.5-25 mcg inhaler Inhale 1 puff into the lungs once daily. 1 each 3     furosemide (LASIX) 40 MG tablet Take 1 tablet (40 mg total) by mouth once daily. 90 tablet 3     gabapentin (NEURONTIN) 100 MG capsule Take 1 capsule (100 mg total) by mouth every evening. 30 capsule 3     HYDROcodone-acetaminophen (NORCO) 7.5-325 mg per tablet Take 1 tablet by mouth every 6 (six) hours as needed for Pain. 90 to last 30 days .   Do not fill until 12/31/24 90 tablet 0     LIDOcaine (LIDODERM) 5 % Place 1 patch onto the skin once daily. Remove & Discard patch within 12 hours or as directed by MD 30 patch 11     losartan (COZAAR) 25 MG tablet Take 1 tablet (25 mg total) by mouth every evening. 90 tablet 1     metoprolol succinate (TOPROL-XL) 50 MG 24 hr tablet Take 1 tablet (50 mg total) by mouth nightly. 90 tablet 1     omeprazole (PRILOSEC) 40 MG capsule Take 1 capsule (40 mg total) by mouth 2 (two) times daily before meals. 60 capsule 11     PREMARIN vaginal cream Place 0.5 g vaginally twice a week. 30 g 5     rivaroxaban (XARELTO) 20 mg Tab Take 1 tablet (20 mg total) by mouth every evening. 90 tablet 3     sucralfate (CARAFATE) 100 mg/mL suspension Take 10 mLs (1 g total) by mouth every meal as needed (throat pain, chest discomfort, acid reflux). 473 mL 6     triamcinolone acetonide 0.1% (KENALOG) 0.1 % cream Aaa bid prn itching/rash 80 g 3        Physical Exam:    Vital Signs: There were no vitals filed for this visit.    Gen: NAD, lying comfortably  HENT: NCAT, oropharynx clear  Eyes: anicteric sclerae, EOMI grossly  Neck: supple, no visible masses/goiter  Cardiac: RRR  Lungs: non-labored breathing  Abd: soft, NT/ND, normoactive BS  Ext: no LE edema, warm, well perfused  Skin: skin intact on exposed body parts, no visible rashes, lesions  Neuro: A&Ox4, neuro exam grossly intact, moves all extremities  Psych: appropriate mood, affect      Labs:  Lab Results   Component Value Date    WBC 9.08 11/25/2024    HGB  12.4 11/25/2024    HCT 39.6 11/25/2024     11/25/2024    CHOL 126 08/22/2024    TRIG 98 08/22/2024    HDL 26 (L) 08/22/2024    ALT 24 11/18/2024    AST 26 11/18/2024     11/25/2024    K 4.0 11/25/2024     11/25/2024    CREATININE 0.8 11/25/2024    BUN 15 11/25/2024    CO2 26 11/25/2024    TSH 2.253 11/18/2024    INR 1.4 (H) 09/16/2023    HGBA1C 6.1 (H) 08/22/2024       Plan:  EGD with dilation for dysphagia    I have explained the risks and benefits of endoscopy procedures to the patient including but not limited to bleeding, perforation, infection, and death.  The patient was asked if they understand and allowed to ask any further questions to their satisfaction.      Neeru Lovelace MD

## 2025-01-08 NOTE — PROVATION PATIENT INSTRUCTIONS
Discharge Summary/Instructions after an Endoscopic Procedure  Patient Name: Lindy Sparks  Patient MRN: [id number]  Patient YOB: 1950  Wednesday, January 8, 2025  Neeru oLvelace MD  Dear patient,  As a result of recent federal legislation (The Federal Cures Act), you may   receive lab or pathology results from your procedure in your [facility]   account before your physician is able to contact you. Your physician or   their representative will relay the results to you with their   recommendations at their soonest availability.  Thank you,  RESTRICTIONS:  During your procedure today, you received medications for sedation.  These   medications may affect your judgment, balance and coordination.  Therefore,   for 24 hours, you have the following restrictions:   - DO NOT drive a car, operate machinery, make legal/financial decisions,   sign important papers or drink alcohol.    ACTIVITY:  Today: no heavy lifting, straining or running due to procedural   sedation/anesthesia.  The following day: return to full activity including work.  DIET:  Eat and drink normally unless instructed otherwise.     TREATMENT FOR COMMON SIDE EFFECTS:  - Mild abdominal pain, nausea, belching, bloating or excessive gas:  rest,   eat lightly and use a heating pad.  - Sore Throat: treat with throat lozenges and/or gargle with warm salt   water.  - Because air was used during the procedure, expelling large amounts of air   from your rectum or belching is normal.  - If a bowel prep was taken, you may not have a bowel movement for 1-3 days.    This is normal.  SYMPTOMS TO WATCH FOR AND REPORT TO YOUR PHYSICIAN:  1. Abdominal pain or bloating, other than gas cramps.  2. Chest pain.  3. Back pain.  4. Signs of infection such as: chills or fever occurring within 24 hours   after the procedure.  5. Rectal bleeding, which would show as bright red, maroon, or black stools.   (A tablespoon of blood from the rectum is not serious, especially  if   hemorrhoids are present.)  6. Vomiting.  7. Weakness or dizziness.  GO DIRECTLY TO THE NEAREST EMERGENCY ROOM IF YOU HAVE ANY OF THE FOLLOWING:      Difficulty breathing              Chills and/or fever over 101 F   Persistent vomiting and/or vomiting blood   Severe abdominal pain   Severe chest pain   Black, tarry stools   Bleeding- more than one tablespoon   Any other symptom or condition that you feel may need urgent attention  Your doctor recommends these additional instructions:  If any biopsies were taken, your doctors clinic will contact you in 1 to 2   weeks with any results.  - Discharge patient to home.   - Resume previous diet.   - Continue present medications.   - Await pathology results.  For questions, problems or results please call your physician - Neeru Lovelace MD at Work:  [phone].  [facility] can be reached at [phone]     IF A COMPLICATION OR EMERGENCY SITUATION ARISES AND YOU ARE UNABLE TO REACH   YOUR PHYSICIAN - GO DIRECTLY TO THE EMERGENCY ROOM.  Neeru Lovelace MD  1/8/2025 2:35:34 PM  This report has been verified and signed electronically.  Dear patient,  As a result of recent federal legislation (The Federal Cures Act), you may   receive lab or pathology results from your procedure in your [facility]   account before your physician is able to contact you. Your physician or   their representative will relay the results to you with their   recommendations at their soonest availability.  Thank you,  PROVATION

## 2025-01-08 NOTE — TRANSFER OF CARE
Anesthesia Transfer of Care Note    Patient: Lindy Sparks    Procedure(s) Performed: Procedure(s) (LRB):  EGD (ESOPHAGOGASTRODUODENOSCOPY) (N/A)    Patient location: GI    Anesthesia Type: general    Transport from OR: Transported from OR on room air with adequate spontaneous ventilation    Post pain: adequate analgesia    Post assessment: no apparent anesthetic complications and tolerated procedure well    Post vital signs: stable    Level of consciousness: responds to stimulation    Nausea/Vomiting: no nausea/vomiting    Complications: none    Transfer of care protocol was followed      Last vitals: Visit Vitals  /89 (BP Location: Left arm, Patient Position: Lying)   Pulse (!) 122   Temp 36.4 °C (97.5 °F) (Oral)   Resp 17   LMP  (LMP Unknown)   SpO2 97%   Breastfeeding No

## 2025-01-09 ENCOUNTER — PATIENT MESSAGE (OUTPATIENT)
Dept: GASTROENTEROLOGY | Facility: CLINIC | Age: 75
End: 2025-01-09
Payer: MEDICAID

## 2025-01-09 DIAGNOSIS — R49.0 HOARSENESS OF VOICE: Primary | ICD-10-CM

## 2025-01-10 LAB
FINAL PATHOLOGIC DIAGNOSIS: NORMAL
GROSS: NORMAL
Lab: NORMAL

## 2025-01-12 NOTE — ANESTHESIA POSTPROCEDURE EVALUATION
Anesthesia Post Evaluation    Patient: Lindy Sparks    Procedure(s) Performed: Procedure(s) (LRB):  EGD (ESOPHAGOGASTRODUODENOSCOPY) (N/A)    Final Anesthesia Type: general      Patient location during evaluation: GI PACU  Patient participation: Yes- Able to Participate  Level of consciousness: awake and alert and oriented  Post-procedure vital signs: reviewed and stable  Pain management: adequate  Airway patency: patent    PONV status at discharge: No PONV  Anesthetic complications: no      Cardiovascular status: blood pressure returned to baseline and hemodynamically stable  Respiratory status: unassisted, spontaneous ventilation and room air  Hydration status: euvolemic  Follow-up not needed.              Vitals Value Taken Time   /86 01/08/25 1504   Temp 36.4 °C (97.5 °F) 01/08/25 1434   Pulse 98 01/08/25 1504   Resp 16 01/08/25 1504   SpO2 98 % 01/08/25 1504         Event Time   Out of Recovery 15:24:00         Pain/Jamie Score: No data recorded

## 2025-01-22 ENCOUNTER — PATIENT MESSAGE (OUTPATIENT)
Dept: OTOLARYNGOLOGY | Facility: CLINIC | Age: 75
End: 2025-01-22
Payer: MEDICAID

## 2025-02-28 PROBLEM — I87.2 PERIPHERAL VENOUS INSUFFICIENCY: Status: ACTIVE | Noted: 2019-11-05

## 2025-03-10 PROBLEM — I87.2 PERIPHERAL VENOUS INSUFFICIENCY: Status: RESOLVED | Noted: 2019-11-05 | Resolved: 2025-03-10

## 2025-03-10 PROBLEM — R09.89 BILATERAL CAROTID BRUITS: Status: ACTIVE | Noted: 2025-03-10

## 2025-03-10 PROBLEM — G47.33 OSA (OBSTRUCTIVE SLEEP APNEA): Status: RESOLVED | Noted: 2024-12-26 | Resolved: 2025-03-10

## 2025-04-08 ENCOUNTER — PATIENT MESSAGE (OUTPATIENT)
Dept: OTOLARYNGOLOGY | Facility: CLINIC | Age: 75
End: 2025-04-08
Payer: MEDICAID

## 2025-05-02 PROBLEM — A41.9 SEPSIS: Status: ACTIVE | Noted: 2025-05-02

## 2025-05-04 PROBLEM — J96.01 ACUTE HYPOXIC RESPIRATORY FAILURE: Status: RESOLVED | Noted: 2023-08-09 | Resolved: 2025-05-04

## 2025-05-04 PROBLEM — I50.30 (HFPEF) HEART FAILURE WITH PRESERVED EJECTION FRACTION: Status: RESOLVED | Noted: 2021-08-11 | Resolved: 2025-05-04

## 2025-05-04 PROBLEM — A41.9 SEPSIS: Status: RESOLVED | Noted: 2025-05-02 | Resolved: 2025-05-04

## 2025-05-09 PROBLEM — J12.9 VIRAL PNEUMONIA: Status: ACTIVE | Noted: 2017-06-21

## 2025-05-09 PROBLEM — I48.3 TYPICAL ATRIAL FLUTTER: Status: ACTIVE | Noted: 2025-05-09

## 2025-05-12 PROBLEM — R53.1 GENERALIZED WEAKNESS: Status: RESOLVED | Noted: 2023-04-05 | Resolved: 2025-05-12

## 2025-05-15 ENCOUNTER — OFFICE VISIT (OUTPATIENT)
Dept: OTOLARYNGOLOGY | Facility: CLINIC | Age: 75
End: 2025-05-15
Payer: MEDICAID

## 2025-05-15 VITALS
OXYGEN SATURATION: 98 % | HEART RATE: 95 BPM | HEIGHT: 67 IN | BODY MASS INDEX: 31.59 KG/M2 | SYSTOLIC BLOOD PRESSURE: 126 MMHG | DIASTOLIC BLOOD PRESSURE: 86 MMHG | WEIGHT: 201.25 LBS

## 2025-05-15 DIAGNOSIS — J30.2 SEASONAL ALLERGIC RHINITIS, UNSPECIFIED TRIGGER: ICD-10-CM

## 2025-05-15 DIAGNOSIS — R49.0 DYSPHONIA: ICD-10-CM

## 2025-05-15 DIAGNOSIS — J34.3 HYPERTROPHY OF INFERIOR NASAL TURBINATE: ICD-10-CM

## 2025-05-15 DIAGNOSIS — J38.3 VOCAL FOLD DYSFUNCTION: Primary | ICD-10-CM

## 2025-05-15 DIAGNOSIS — R13.10 DYSPHAGIA, UNSPECIFIED TYPE: ICD-10-CM

## 2025-05-15 PROCEDURE — 3008F BODY MASS INDEX DOCD: CPT | Mod: CPTII,S$GLB,, | Performed by: OTOLARYNGOLOGY

## 2025-05-15 PROCEDURE — 1126F AMNT PAIN NOTED NONE PRSNT: CPT | Mod: CPTII,S$GLB,, | Performed by: OTOLARYNGOLOGY

## 2025-05-15 PROCEDURE — 1157F ADVNC CARE PLAN IN RCRD: CPT | Mod: CPTII,S$GLB,, | Performed by: OTOLARYNGOLOGY

## 2025-05-15 PROCEDURE — 99204 OFFICE O/P NEW MOD 45 MIN: CPT | Mod: 25,S$GLB,, | Performed by: OTOLARYNGOLOGY

## 2025-05-15 PROCEDURE — 1111F DSCHRG MED/CURRENT MED MERGE: CPT | Mod: CPTII,S$GLB,, | Performed by: OTOLARYNGOLOGY

## 2025-05-15 PROCEDURE — 3288F FALL RISK ASSESSMENT DOCD: CPT | Mod: CPTII,S$GLB,, | Performed by: OTOLARYNGOLOGY

## 2025-05-15 PROCEDURE — 3074F SYST BP LT 130 MM HG: CPT | Mod: CPTII,S$GLB,, | Performed by: OTOLARYNGOLOGY

## 2025-05-15 PROCEDURE — 3079F DIAST BP 80-89 MM HG: CPT | Mod: CPTII,S$GLB,, | Performed by: OTOLARYNGOLOGY

## 2025-05-15 PROCEDURE — 3044F HG A1C LEVEL LT 7.0%: CPT | Mod: CPTII,S$GLB,, | Performed by: OTOLARYNGOLOGY

## 2025-05-15 PROCEDURE — 4010F ACE/ARB THERAPY RXD/TAKEN: CPT | Mod: CPTII,S$GLB,, | Performed by: OTOLARYNGOLOGY

## 2025-05-15 PROCEDURE — 31575 DIAGNOSTIC LARYNGOSCOPY: CPT | Mod: S$GLB,,, | Performed by: OTOLARYNGOLOGY

## 2025-05-15 PROCEDURE — 1100F PTFALLS ASSESS-DOCD GE2>/YR: CPT | Mod: CPTII,S$GLB,, | Performed by: OTOLARYNGOLOGY

## 2025-05-15 RX ORDER — AZELASTINE 1 MG/ML
1 SPRAY, METERED NASAL 2 TIMES DAILY
Qty: 30 ML | Refills: 3 | Status: SHIPPED | OUTPATIENT
Start: 2025-05-15

## 2025-05-15 RX ORDER — FLUTICASONE PROPIONATE 50 MCG
2 SPRAY, SUSPENSION (ML) NASAL 2 TIMES DAILY
Qty: 18.2 ML | Refills: 3 | Status: SHIPPED | OUTPATIENT
Start: 2025-05-15

## 2025-05-15 NOTE — PROGRESS NOTES
OTOLARYNGOLOGY CLINIC NOTE  Date:  05/15/2025     Chief complaint:  Chief Complaint   Patient presents with    Hoarse    Dysphagia       History of Present Illness  Lindy Sparks is a 74 y.o. female  presenting today for a new evaluation and treatment of dysphagia and dysphonia  Referred kindly by dr baird    Had endoscopy with gi in January and noted voice issues after that per epic chart review.   Would talk and then voice would go out    Has been having trouble with her swallowing   Has had botox injections with dr baird; problems with voice were before that alos. Has had voice and swallowing issues for a while - unclear how long   Has had mbs and food going into the windpipe at times  +throat claerrinf    Omeprazole 40 mg once a day and sucrulfate bid -soear message asked if doing bid ppi and sucrulfate 4 times per day     Sometimes gets congestion and runny nose at times    Asw slp in 2023- voice h af gne out with mbss and per note thought could be psychosomatict  12/23/2020 - [facility]     Impressions/Recommendations:     While overall functional oral and pharyngeal physiology for support of multiple consistencies was demonstrated, pt presents with overt s/s of esophageal dysfunction including stasis of all consistencies and retrograde movement of thin liquids and puree. SLP reviewed various potential trigger foods including high intake of caffeine, mint, tomato-based products, and citrus fruits with pt citing high degree of caffeine and mint intake. Education regarding limited intake of triggering foods and additional precautions including sitting up 60 minutes following meals, avoiding tight clothing, elevating HOB 6-8 inches when sleeping, and avoiding oral intake within 2 hours of sleeping were discussed. Recommend additional GI testing including upper GI series and referral to GI given severity of symptoms reported without alleviation by current medical management and degree  "of esopahgeal retention observed during MBSS. Consider esophageal manometry as well given that pt's symptoms are associated with esophageal physiology during oral intake. Pt verbalized understanding of plan of care moving forward and had no further questions/concerns. Recommend a regular diet with thin liquids and safe swallow strategies including alternating bites and sips. No additional skilled speech services required at this time.  "  Past Medical History  Past Medical History:   Diagnosis Date    Acid reflux     Anxiety     Arthritis     Back pain     low back    Bilateral carotid bruits 2024    Cerebrovascular accident (CVA) due to embolism of left posterior cerebral artery 2019    Depression     Ectopic pregnancy     History of psychiatric hospitalization     Hx of psychiatric care     Incarcerated hernia     Merkel cell carcinoma     Migraine     Neuropathy     SHAW (obstructive sleep apnea)     Pelvic mass in female 10/08/2017    Psychiatric problem     PTSD (post-traumatic stress disorder)     Restrictive lung disease     Right hip pain     Suicide attempt     Therapy         Past Surgical History  Past Surgical History:   Procedure Laterality Date    CARDIAC CATHETERIZATION      CATARACT EXTRACTION W/  INTRAOCULAR LENS IMPLANT Left 2022    CATARACT EXTRACTION W/ INTRAOCULAR LENS IMPLANT Right 2022     SECTION      x1    CHOLECYSTECTOMY      COLONOSCOPY      COLONOSCOPY N/A 09/15/2022    Procedure: COLONOSCOPY;  Surgeon: Sourav Doan MD;  Location: [facility];  Service: Endoscopy;  Laterality: N/A;    ECTOPIC PREGNANCY SURGERY      ESOPHAGEAL MANOMETRY WITH MEASUREMENT OF IMPEDANCE N/A 10/12/2023    Procedure: MANOMETRY, ESOPHAGUS, WITH IMPEDANCE MEASUREMENT;  Surgeon: Emilee Hodgson MD;  Location: [facility] ([address] [place] [address]);  Service: Endoscopy;  Laterality: N/A;  Referred by: CLIFTON Farah  Route instructions sent: [facility] and emailed to " [email or web address]  Other concerns: SOB(told to bring inhaler if needed), on xarelto  9/19 pt unavaliable when called to confirm MAL  10/6 prec    ESOPHAGOGASTRODUODENOSCOPY N/A 09/15/2022    Procedure: EGD (ESOPHAGOGASTRODUODENOSCOPY);  Surgeon: Sourav Doan MD;  Location: [facility];  Service: Endoscopy;  Laterality: N/A;    ESOPHAGOGASTRODUODENOSCOPY N/A 10/21/2024    Procedure: EGD (ESOPHAGOGASTRODUODENOSCOPY);  Surgeon: Neeru Lovelace MD;  Location: [facility] (2ND FLR);  Service: Endoscopy;  Laterality: N/A;  oli pt / prep inst sent to pt via portal /xarelto/cpap/hx of stroke, full liquid diet x 1 day, clear liquid x1 day   endoflip/ dilation vs botox  ok to hold Xarelto 2 days per Dr Xie  10/14- precall complete; confirmed; resent instructions o    ESOPHAGOGASTRODUODENOSCOPY N/A 1/8/2025    Procedure: EGD (ESOPHAGOGASTRODUODENOSCOPY);  Surgeon: Neeru Lovelace MD;  Location: [facility];  Service: Endoscopy;  Laterality: N/A;  Dr. Lovelace, portal, Xarelto, ASam  ok to hold Xarelto 2 days per Dr Xie  per pulm Dr [place] office visit 12/5-increased risk for respiratory comp from anesthesia:atelectasis/pneumonia/respiratory failure requiring intubation. risks are not pr    EXCISION-WIDE LOCAL Right 2/15/2024    Procedure: EXCISION-WIDE LOCAL;  Surgeon: Sundar Dolan MD;  Location: [facility] OR;  Service: General;  Laterality: Right;  right lower extremity    HYSTERECTOMY  10/30/2017    ANTONIO    OOPHORECTOMY      PERIPHERALLY INSERTED CENTRAL CATHETER INSERTION N/A 9/21/2023    Procedure: Insertion-picc;  Surgeon: David Bloom MD;  Location: [facility] CATH LAB;  Service: Cardiovascular;  Laterality: N/A;    SENTINEL LYMPH NODE BIOPSY Right 2/15/2024    Procedure: BIOPSY, LYMPH NODE, SENTINEL;  Surgeon: Sundar Dolan MD;  Location: [facility];  Service: General;  Laterality: Right;  right lower extremity    TONSILLECTOMY      TONSILLECTOMY, ADENOIDECTOMY      UMBILICAL  HERNIA REPAIR N/A 07/20/2018    Procedure: REPAIR, HERNIA, UMBILICAL, AGE 5 YEARS OR OLDER;  Surgeon: Jv Larson MD;  Location: [facility] OR;  Service: General;  Laterality: N/A;        Medications  Medications Ordered Prior to Encounter[1]    Review of Systems  Review of Systems   Constitutional:  Positive for malaise/fatigue.   Respiratory:  Positive for cough, shortness of breath and wheezing.    Cardiovascular:  Positive for chest pain.   Gastrointestinal:  Positive for heartburn.   Musculoskeletal:  Positive for back pain.   Skin: Negative.    Neurological:  Positive for dizziness and headaches.   Endo/Heme/Allergies:  Bruises/bleeds easily.   Psychiatric/Behavioral:  Positive for depression. The patient is nervous/anxious.         Social History   reports that she has never smoked. She has never used smokeless tobacco. She reports that she does not drink alcohol and does not use drugs.     Family History  Family History   Problem Relation Name Age of Onset    Hyperlipidemia Mother      Stroke Mother      Heart disease Father          ? age of onset    Stroke Father      Stroke Brother      No Known Problems Daughter      No Known Problems Son      No Known Problems Daughter      Diabetes Sister      Hyperlipidemia Sister      Hyperlipidemia Sister      Hyperlipidemia Sister      No Known Problems Sister      No Known Problems Sister      No Known Problems Sister      No Known Problems Sister      No Known Problems Brother      No Known Problems Brother      No Known Problems Brother      No Known Problems Brother      No Known Problems Brother      No Known Problems Brother      No Known Problems Brother          Physical Exam   Vitals:    05/15/25 1143   BP: 126/86   Pulse: 95    Body mass index is 31.52 kg/m².            GENERAL: no acute distress.  HEAD: normocephalic.   EYES: lids and lashes normal. No scleral icterus  EARS: external ear without lesion, normal pinna shape and position.    NOSE: external  nose without significant bony abnormality  ORAL CAVITY/OROPHARYNX: tongue midline and mobile.   NECK: trachea midline.   LYMPH NODES:No cervical lymphadenopathy.  RESPIRATORY: no stridor, no stertor. Voice raspy and sounds strained Respirations nonlabored.  NEURO: alert, responds to questions appropriately.   PSYCH:mood appropriate    PROCEDURE NOTE  NAME OF PROCEDURE: Flexible Laryngoscopy, diagnostic  INDICATIONS: gag reflex precludes mirror exam,  dysphonia , dysphagia  FINDINGS: no overt pseudosulcus, no nodules    Consent: After procedure was explained in detail and all questions answered, verbal consent was obtained for performing flexible laryngoscopy.  Anesthesia: topical 4% lidocaine and neosynephrine  Procedure: With patient in seated position, the scope was inserted into the bilateral nasal passageway and advanced atraumatically into the nasopharynx to examine the following structures:  Nasal cavity: Turbinates with  hypertrophy. no middle meatal edema. No purulent drainage.   Nasopharynx: no mass or lesion noted in nasopharynx.   Oropharynx: base of tongue without  mass or ulceration. Lingual tonsils normal in appearance  Hypopharynx: posterior pharyngeal wall without mass or lesion. No pooling of secretions. Pyriform sinuses visible without mass or lesion  Larynx: epiglottis normal without lesion. False vocal folds without edema/erythema/lesion. True vocal folds mobile and without lesion. Mild interarytenoid edema no erythema . Postcricoid region with mild edema no lesion supraglottic squeeze. Precordal edema no overt nodules  Subglottis: visualized portion of subglottis normal in appearance    After examination performed, the scope was removed atraumatically . The patient tolerated the procedure well. Photodocumentation obtained with representative images below, all images and/or videos uploaded in media section of epic.                      Imaging:  The patient does have any pertinent and/or recent  imaging of the head and neck. Ct head images and report reviewed fluid in left maxillary sinus      From slp note    Labs:  CBC  Recent Labs   Lab 05/10/25  0357 05/11/25  0316 05/12/25  0321   WBC 7.89 8.16 8.44   HGB 13.0 12.4 12.3   HCT 40.5 37.8 39.0   MCV 86 87 88   Platelet Count 287 336 344     BMP  Recent Labs   Lab 05/10/25  0326 05/11/25  0316 05/12/25  0321   Glucose 112 H 105 110   Sodium 141 141 139   Potassium 3.6 4.1 3.9   Chloride 105 108 107   CO2 24 24 22 L   BUN 18 21 20   Creatinine 0.8 0.7 0.8   Calcium 9.3 9.2 9.0   Phosphorus Level 3.2 3.7 3.3   Magnesium  1.9 1.9 1.9     COAGS  Recent Labs   Lab 09/16/23  0423 05/02/25  0329 05/10/25  0326   PT  --  12.0 17.2 H   INR 1.4 H 1.1 1.6 H     ENDOCRINE    Assessment  1. Dysphonia  - Ambulatory referral/consult to ENT  - TSH; Future    2. Vocal fold dysfunction  - Ambulatory referral/consult to Speech Therapy    3. Hypertrophy of inferior nasal turbinate    4. Seasonal allergic rhinitis, unspecified trigger    5. Dysphagia, unspecified type       Plan:  Discussed plan of care with patient in detail and all questions answered. Patient reported understanding of plan of care. I gave the patient the opportunity to ask questions and patient confirmed all questions answered to satisfaction.     Turbinate hypertrophy - saline flonase and astelin bid     No nodules I thnk has mtd and pvfd   Sometimes has noise with breathing and from mbs report when went aphonic and had some aspiration as well ; rec seeing slp at [facility] for pvfd     Check thyroid function     F/u 4-6 months sooner if issue       [1]   Current Outpatient Medications on File Prior to Visit   Medication Sig Dispense Refill    albuterol (PROVENTIL) 2.5 mg /3 mL (0.083 %) nebulizer solution Take 3 mLs (2.5 mg total) by nebulization every 6 (six) hours as needed for Wheezing (wheezing). USE AS DIRECTED 300 mL 3    albuterol-ipratropium (DUO-NEB) 2.5 mg-0.5 mg/3 mL nebulizer solution Take 3  mLs by nebulization every 6 (six) hours as needed for Wheezing or Shortness of Breath. Rescue 75 mL 0    ammonium lactate 12 % Crea Aaa qd after shower prn dry skin 385 g 11    aspirin 81 MG Chew Take 1 tablet (81 mg total) by mouth once daily.      calcitRIOL (ROCALTROL) 0.5 MCG Cap TAKE 1 CAPSULE(0.5 MCG) BY MOUTH EVERY DAY 30 capsule 3    diclofenac sodium (VOLTAREN ARTHRITIS PAIN) 1 % Gel Apply 2 g topically once daily. 20 g 0    DOCUSATE CALCIUM (STOOL SOFTENER ORAL) Take by mouth as needed. Patient unsure of dose      EScitalopram oxalate (LEXAPRO) 20 MG tablet Take 1 tablet (20 mg total) by mouth Daily. 90 tablet 0    fluticasone-umeclidin-vilanter (TRELEGY ELLIPTA) 200-62.5-25 mcg inhaler Inhale 1 puff into the lungs once daily. 1 each 3    furosemide (LASIX) 40 MG tablet Take 1 tablet (40 mg total) by mouth daily as needed (swelling, fluid). 90 tablet 3    gabapentin (NEURONTIN) 100 MG capsule Take 1 capsule (100 mg total) by mouth every evening. 30 capsule 3    HYDROcodone-acetaminophen (NORCO) 7.5-325 mg per tablet Take 1 tablet by mouth every 6 (six) hours as needed for Pain. 90 to last 30 days .   Do not fill until 5/4/25 90 tablet 0    LIDOcaine (LIDODERM) 5 % Place 1 patch onto the skin once daily. Remove & Discard patch within 12 hours or as directed by MD 30 patch 11    [Paused] losartan (COZAAR) 50 MG tablet Take 1 tablet (50 mg total) by mouth every evening. 90 tablet 3    metoprolol succinate (TOPROL-XL) 25 MG 24 hr tablet Take 1 tablet (25 mg total) by mouth once daily. Take Toprol-XL 25 mg once daily every morning. 90 tablet 0    metoprolol succinate (TOPROL-XL) 50 MG 24 hr tablet Take 1 tablet (50 mg total) by mouth nightly. 90 tablet 3    omeprazole (PRILOSEC) 40 MG capsule Take 1 capsule (40 mg total) by mouth 2 (two) times daily before meals. 60 capsule 11    PREMARIN vaginal cream Place 0.5 g vaginally twice a week. 30 g 5    rivaroxaban (XARELTO) 20 mg Tab Take 1 tablet (20 mg total) by  mouth every evening. 90 tablet 3    sucralfate (CARAFATE) 100 mg/mL suspension Take 10 mLs (1 g total) by mouth every meal as needed (throat pain, chest discomfort, acid reflux). 473 mL 6    triamcinolone acetonide 0.1% (KENALOG) 0.1 % cream Aaa bid prn itching/rash 80 g 3     No current facility-administered medications on file prior to visit.

## 2025-05-15 NOTE — PATIENT INSTRUCTIONS
Clearing your throat leads to more swelling in the voice box.  This will worsen your symptoms.  You should try to minimize throat clearing as much as possible.Get a cup of water and a straw and when you feel like you want to clear your throat, blow bubble through the straw into the water     Can try gaviscon liquid over the counter - take 15 minutes after a meal as needed for throat phlegm    Avoid mouthwash with alcohol such as listerine. You should use biotene mouth wash    Can sleep with bedside humidifier     You should aim to drink 2 to 3 liters of water daily if you are drinking one cup of coffee.  If you have trouble drinking water, you can cut back or cut out caffeine. If you have trouble drinking water, you can cut back or cut out caffeine.    Lozenges:  Halls Breezers - sold with cough drops, Can try sugar free lozenges with pectin ,  such as halls fruit breezers      Xylimelts, on toothpaste aisle, these are convenient for when you are talking and or sleeping - they stick to gumline and provide moisture - Cellay or MyTinks        Steam and gargle - 3x day  You may consider getting a facial steamer, breathe in warm moist air  through mouth and nose to hydrate vocal folds. On amazon, I like the [place] version that is under $25.        Gargle:   1/2 tsp each salt, baking soda, clear corn syrup, 6 oz warm water  Sip, gargle quietly, spit, repeat until gone, don't eat/drink for 30 minutes after.      Chew gum with baking soda after meals, Orbit White     Order online, when it's time to get more Gaviscon, either Alginate therapy such as Reflux Gourmet  or Gaviscon Advance can be used following meals and at bedtime for reflux coverage. The Acid Watcher Diet by Dr Browning as well as the Chronic Cough Ririe by Dr Aguilar are good reads to gain improved understanding of dietary and behavioral changes that can aid in reduction of LPRD, and to better understand cough and cough control      [email or web address]    Information and instructions from your visit with me today:  Always use saline every time before a medication spray. You can also use saline on its own. If you are using saline and/or the medication sprays on an as needed basis and you have symptoms use the regimen daily for at least 2 weeks. You can use the flonase and astelin together, or if you prefer to start with just one medication spray, the flonase works better by itself compared to astelin by itself. You can try doing the saline and flonase and if still congested, add on the astelin again doing this regimen daily for up to two weeks when congestion. There may be times of the year that you only need saline and there may be times of the year that you need saline, flonase and astelin to control symptoms.     Start using the following medication nasal sprays:   Fluticasone spray:    This medication is a steroid spray. It stays within the nose and does not have absorption into the body that leads to side effects that one has with oral steroid medication. Fluticasone nasal spray is the same as the Flonase brand nasal spray. Discuss with your pharmacist if the price is lower over the counter or with a prescription ( this varies depending on insurance). The medication that is over the counter is the same as the prescription medication. Use this medication as instructed on the prescription, 1-2 sprays on each side of your nose twice daily.     Azelastine  spray:  This medication is an antihistamine used to treat nasal symptoms of allergy, which works specifically in the nose unlike antihistamine pills which have more of an effect on the whole body. Use this medication as instructed on the prescription, 1 spray on each side of your nose twice daily.     Additional instructions for medication sprays  Place the tip of the medication bottle in your nose and aim slightly up and out on each side to get medication high and deep into your nose and  "sinuses, and not have it all deposit in the very front of your nose. Aim the tip of the nozzle towards the outer corner of your eye . You can imagine aiming towards the back of your eyeball on each side for this, as opposed to straight back to the center of your nose and head.     You need to use this medication every day regardless of symptoms, as it takes time ( a few weeks) to work and get the benefits. It does not work on an "as needed" basis like taking a decongestant. If your symptoms only occur in a particular season, then the medication can be used seasonally instead of year long. For seasonal symptoms, you should start using the spray twice daily a month before when you normally have symptoms ( for example, if symptoms start in August, should start at the end of June).     Start nasal irrigations with saline solution- you can either use a rinse or a mist spray:    NASAL SALINE SPRAY ( simply saline and arm and hammer are examples) There are several different brands found in the cold and flu aisle of the pharmacy. You can use any brand of saline spray - this will deliver the saline by a gentle mist ( if you have difficulty or discomfort with nasal rinse/ a lot of fluid in the nose, this will be more comfortable).       Always rinse your nose with saline prior to using medication sprays and wait a couple of hours before using again. You can use the saline throughout the day to help with stuffy nose or dry nose.    Do not use nasal decongestant sprays such as Afrin or similar products long term ( over 3 days) .  This can cause long term physical nasal addiction. Afrin should only be used if having nose bleeds, severe nasal congestion , or severe ear pain/fullness and should not be used for more than 2-3 days in a row . It is a not a medication that should be used for a long period of time.     It was nice meeting you today, and I look forward to helping you feel better soon. Please don't hesitate to call if " you have any other questions or concerns, or if I can be of any assistance in the meantime.      Heidi Ponce MD    [facility]     Phone  [phone]    Fax      [phone]        Heidi Ponce MD  Otorhinolaryngology

## 2025-05-20 PROBLEM — J12.9 VIRAL PNEUMONIA: Status: RESOLVED | Noted: 2017-06-21 | Resolved: 2025-05-20

## 2025-05-21 PROBLEM — I48.91 ATRIAL FIBRILLATION WITH RVR: Status: ACTIVE | Noted: 2025-05-21

## 2025-05-21 PROBLEM — G47.33 OSA (OBSTRUCTIVE SLEEP APNEA): Status: ACTIVE | Noted: 2025-05-21

## 2025-05-21 PROBLEM — Z86.79 HISTORY OF ATRIAL FLUTTER: Status: ACTIVE | Noted: 2025-05-21

## 2025-05-21 PROBLEM — I50.30 HEART FAILURE WITH PRESERVED EJECTION FRACTION, NYHA CLASS II: Status: ACTIVE | Noted: 2025-05-21

## 2025-05-21 PROBLEM — I48.3 TYPICAL ATRIAL FLUTTER: Status: RESOLVED | Noted: 2025-05-09 | Resolved: 2025-05-21

## 2025-05-21 PROBLEM — Z86.73 HISTORY OF STROKE: Status: ACTIVE | Noted: 2025-05-21

## 2025-05-21 PROBLEM — I50.30 (HFPEF) HEART FAILURE WITH PRESERVED EJECTION FRACTION: Status: RESOLVED | Noted: 2021-08-11 | Resolved: 2025-05-21

## 2025-05-22 ENCOUNTER — PATIENT MESSAGE (OUTPATIENT)
Dept: SPEECH THERAPY | Facility: HOSPITAL | Age: 75
End: 2025-05-22
Payer: MEDICAID

## 2025-06-11 ENCOUNTER — TELEPHONE (OUTPATIENT)
Dept: SPEECH THERAPY | Facility: HOSPITAL | Age: 75
End: 2025-06-11
Payer: MEDICAID

## (undated) DEVICE — SYR 10CC LUER LOCK

## (undated) DEVICE — SUT 2/0 54IN COATED VICRYL

## (undated) DEVICE — ELECTRODE REM PLYHSV RETURN 9

## (undated) DEVICE — SUT 0 54IN COATED VICRYL U

## (undated) DEVICE — DRESSING MEPORE ADH 3.5X12

## (undated) DEVICE — SUT MCRYL PLUS 4-0 PS2 27IN

## (undated) DEVICE — LOOP VESSEL YELLOW MAXI

## (undated) DEVICE — SUT CTD VICRYL VIL BR SH 27

## (undated) DEVICE — SEE MEDLINE ITEM 154981

## (undated) DEVICE — DRESSING ABSRBNT ISLAND 3.6X8

## (undated) DEVICE — SEE MEDLINE ITEM 152622

## (undated) DEVICE — APPLICATOR CHLORAPREP ORN 26ML

## (undated) DEVICE — SPONGE LAP 18X18 PREWASHED

## (undated) DEVICE — SET DECANTER MEDICHOICE

## (undated) DEVICE — CLIPPER BLADE MOD 4406 (CAREF)

## (undated) DEVICE — LUBRICANT SURGILUBE 2 OZ

## (undated) DEVICE — SUT 1 48IN PDS II VIO MONO

## (undated) DEVICE — SUT CTD VICRYL 0 VIL BR/CT

## (undated) DEVICE — TRAY FOLEY 16FR INFECTION CONT

## (undated) DEVICE — SYR 30CC LUER LOCK

## (undated) DEVICE — SEE MEDLINE ITEM 146417

## (undated) DEVICE — GOWN SURGICAL X-LARGE

## (undated) DEVICE — DRAPE STERI INSTRUMENT 1018

## (undated) DEVICE — ELECTRODE EXTENDED BLADE

## (undated) DEVICE — SUT CTD VICRYL VIL BR CR/SH

## (undated) DEVICE — SEE MEDLINE ITEM 157148

## (undated) DEVICE — SEE MEDLINE ITEM 156902

## (undated) DEVICE — NDL 20GX1-1/2IN IB